# Patient Record
Sex: MALE | Race: WHITE | NOT HISPANIC OR LATINO | Employment: OTHER | URBAN - NONMETROPOLITAN AREA
[De-identification: names, ages, dates, MRNs, and addresses within clinical notes are randomized per-mention and may not be internally consistent; named-entity substitution may affect disease eponyms.]

---

## 2018-09-17 ENCOUNTER — TRANSCRIBE ORDERS (OUTPATIENT)
Dept: ADMINISTRATIVE | Facility: HOSPITAL | Age: 58
End: 2018-09-17

## 2018-09-17 DIAGNOSIS — G89.29 CHRONIC BILATERAL LOW BACK PAIN WITH BILATERAL SCIATICA: Primary | ICD-10-CM

## 2018-09-17 DIAGNOSIS — M54.41 CHRONIC BILATERAL LOW BACK PAIN WITH BILATERAL SCIATICA: Primary | ICD-10-CM

## 2018-09-17 DIAGNOSIS — M54.42 CHRONIC BILATERAL LOW BACK PAIN WITH BILATERAL SCIATICA: Primary | ICD-10-CM

## 2019-07-22 ENCOUNTER — APPOINTMENT (OUTPATIENT)
Dept: CT IMAGING | Facility: HOSPITAL | Age: 59
End: 2019-07-22

## 2019-07-22 ENCOUNTER — HOSPITAL ENCOUNTER (EMERGENCY)
Facility: HOSPITAL | Age: 59
Discharge: HOME | End: 2019-07-22
Attending: EMERGENCY MEDICINE | Admitting: EMERGENCY MEDICINE

## 2019-07-22 VITALS
OXYGEN SATURATION: 96 % | DIASTOLIC BLOOD PRESSURE: 107 MMHG | BODY MASS INDEX: 30.35 KG/M2 | RESPIRATION RATE: 16 BRPM | WEIGHT: 193.4 LBS | SYSTOLIC BLOOD PRESSURE: 153 MMHG | TEMPERATURE: 98.4 F | HEIGHT: 67 IN | HEART RATE: 69 BPM

## 2019-07-22 DIAGNOSIS — S39.92XA INJURY OF BACK, INITIAL ENCOUNTER: Primary | ICD-10-CM

## 2019-07-22 PROCEDURE — 72192 CT PELVIS W/O DYE: CPT

## 2019-07-22 PROCEDURE — 72131 CT LUMBAR SPINE W/O DYE: CPT

## 2019-07-22 PROCEDURE — 96372 THER/PROPH/DIAG INJ SC/IM: CPT

## 2019-07-22 PROCEDURE — 25010000002 MORPHINE PER 10 MG: Performed by: EMERGENCY MEDICINE

## 2019-07-22 PROCEDURE — 99284 EMERGENCY DEPT VISIT MOD MDM: CPT

## 2019-07-22 PROCEDURE — 25010000002 DEXAMETHASONE PER 1 MG: Performed by: EMERGENCY MEDICINE

## 2019-07-22 RX ORDER — KETOROLAC TROMETHAMINE 10 MG/1
10 TABLET, FILM COATED ORAL EVERY 6 HOURS PRN
Status: DISCONTINUED | OUTPATIENT
Start: 2019-07-22 | End: 2019-07-22 | Stop reason: HOSPADM

## 2019-07-22 RX ORDER — DIAZEPAM 5 MG/1
5 TABLET ORAL ONCE
Status: COMPLETED | OUTPATIENT
Start: 2019-07-22 | End: 2019-07-22

## 2019-07-22 RX ORDER — MORPHINE SULFATE 2 MG/ML
8 INJECTION, SOLUTION INTRAMUSCULAR; INTRAVENOUS ONCE
Status: COMPLETED | OUTPATIENT
Start: 2019-07-22 | End: 2019-07-22

## 2019-07-22 RX ORDER — CYCLOBENZAPRINE HCL 10 MG
10 TABLET ORAL 3 TIMES DAILY PRN
Qty: 10 TABLET | Refills: 0 | Status: SHIPPED | OUTPATIENT
Start: 2019-07-22 | End: 2019-08-15

## 2019-07-22 RX ADMIN — DEXAMETHASONE SODIUM PHOSPHATE 10 MG: 10 INJECTION INTRAMUSCULAR; INTRAVENOUS at 14:16

## 2019-07-22 RX ADMIN — MORPHINE SULFATE 8 MG: 2 INJECTION, SOLUTION INTRAMUSCULAR; INTRAVENOUS at 14:17

## 2019-07-22 RX ADMIN — DIAZEPAM 5 MG: 5 TABLET ORAL at 14:15

## 2019-07-22 RX ADMIN — KETOROLAC TROMETHAMINE 10 MG: 10 TABLET, FILM COATED ORAL at 15:15

## 2019-08-15 ENCOUNTER — OFFICE VISIT (OUTPATIENT)
Dept: NEUROSURGERY | Facility: CLINIC | Age: 59
End: 2019-08-15

## 2019-08-15 VITALS — TEMPERATURE: 98.2 F | BODY MASS INDEX: 30.13 KG/M2 | HEIGHT: 67 IN | WEIGHT: 192 LBS

## 2019-08-15 DIAGNOSIS — Z98.1 HISTORY OF FUSION OF LUMBAR SPINE: Primary | ICD-10-CM

## 2019-08-15 PROCEDURE — 99213 OFFICE O/P EST LOW 20 MIN: CPT | Performed by: PHYSICIAN ASSISTANT

## 2019-08-15 RX ORDER — ATORVASTATIN CALCIUM 10 MG/1
10 TABLET, FILM COATED ORAL NIGHTLY
COMMUNITY
Start: 2015-04-09 | End: 2020-05-26

## 2019-08-15 RX ORDER — GABAPENTIN 100 MG/1
200 CAPSULE ORAL NIGHTLY PRN
COMMUNITY
Start: 2015-01-02

## 2019-08-15 RX ORDER — IBUPROFEN 800 MG/1
800 TABLET ORAL 3 TIMES DAILY
COMMUNITY
Start: 2015-01-02 | End: 2019-11-14 | Stop reason: HOSPADM

## 2019-08-15 RX ORDER — ACETAMINOPHEN 325 MG/1
650 TABLET ORAL EVERY 6 HOURS PRN
COMMUNITY
Start: 2015-02-05

## 2019-08-15 RX ORDER — METHYLPREDNISOLONE 4 MG/1
TABLET ORAL
Qty: 21 TABLET | Refills: 0 | Status: SHIPPED | OUTPATIENT
Start: 2019-08-15 | End: 2019-11-07

## 2019-08-15 RX ORDER — OXYCODONE HYDROCHLORIDE 15 MG/1
15 TABLET ORAL EVERY 6 HOURS PRN
COMMUNITY
Start: 2019-08-12

## 2019-08-15 NOTE — PROGRESS NOTES
Patient: Rory Lal  : 1960  Gender: male    Primary Care Provider: Elvi Tobar APRN      Chief Complaint:   Chief Complaint   Patient presents with   • Follow-up       History of Present Illness:  Mr. Lal is a 59-year-old male who presents today for an ED follow-up visit.  He has a history of lumbar fusion in  at L5-S1 by Dr. Whitley.  He reports that he did well since this surgery.  He presented to the ER after an incident which occurred approximately 1 month ago in which he fell off of a swing directly onto his buttocks.  He reports that after this fall he has had severe right hip and leg pain.  He also reports pain in his lower back which is somewhat worse than normal.  He admits to numbness of the lateral right leg.  He denies changes in bowel or bladder dysfunction or saddle distribution numbness.  His gait has been affected since the incident and he states that he will begin using a walker soon. He has taken percocet for 10 years for pain. He reports some improvement with morphine shots in ED. Otherwise he feels that the pain is becoming increasingly worse.      Past Medical and Surgical History:  Past Medical History:   Diagnosis Date   • Arthritis    • Neuropathy      Past Surgical History:   Procedure Laterality Date   • BACK SURGERY      sacrum   • HAND SURGERY Left    • HERNIA REPAIR         Current Medications:    Current Outpatient Medications:   •  acetaminophen (TYLENOL) 325 MG tablet, Take  by mouth., Disp: , Rfl:   •  atorvastatin (LIPITOR) 10 MG tablet, Take  by mouth., Disp: , Rfl:   •  gabapentin (NEURONTIN) 100 MG capsule, Take  by mouth., Disp: , Rfl:   •  ibuprofen (ADVIL,MOTRIN) 800 MG tablet, Take  by mouth., Disp: , Rfl:   •  methylPREDNISolone (MEDROL, MONICA,) 4 MG tablet, Take as directed on package instructions., Disp: 21 tablet, Rfl: 0  •  oxyCODONE (ROXICODONE) 15 MG immediate release tablet, , Disp: , Rfl:     Allergies:  No Known Allergies    Review of  "Systems:  Review of Systems   Musculoskeletal: Positive for arthralgias and back pain.   All other systems reviewed and are negative.        Physical Examination:  Vitals:    08/15/19 1252   Temp: 98.2 °F (36.8 °C)   Weight: 87.1 kg (192 lb)   Height: 170.2 cm (67\")       Physical Exam   Constitutional: He is oriented to person, place, and time. He appears well-developed and well-nourished.   HENT:   Head: Normocephalic and atraumatic.   Neck: Normal range of motion. Neck supple.   Musculoskeletal: Normal range of motion. He exhibits no edema, tenderness or deformity.   Neurological: He is alert and oriented to person, place, and time.   Reflex Scores:       Tricep reflexes are 2+ on the right side and 2+ on the left side.       Bicep reflexes are 2+ on the right side and 2+ on the left side.       Brachioradialis reflexes are 2+ on the right side and 2+ on the left side.       Patellar reflexes are 2+ on the right side and 2+ on the left side.       Achilles reflexes are 2+ on the right side and 2+ on the left side.  Decreased sensation over lateral right leg  Gait is moderately antalgic to the right   Skin: Skin is warm and dry.         Assessment:  Acute on chronic low back pain  History of lumbar fusion L5-S1 for spondylolisthesis by Dr. Whitley in 2004    Plan:  Mr. Lal is seen today for back pain. He has a history of successful L5-S1 fusion by Dr. Whitley in 2004.  He developed an episode of acute on chronic low back pain approximately 1 month ago after falling from a swing.  Lumbar CT performed during ED visit was reviewed it did not reveal any acute abnormalities. I have ordered a lumbar MRI with and without contrast to further evaluate his acute back and right leg pain after the fall. Additionally, I have sent a steroid pack in for him. Patient requests to see Dr. Whitley in [place] following the MRI.     Follow Up:  In [place] after MRI    Lacey Jones PA-C  "

## 2019-08-23 ENCOUNTER — HOSPITAL ENCOUNTER (OUTPATIENT)
Dept: MRI IMAGING | Facility: HOSPITAL | Age: 59
Discharge: HOME | End: 2019-08-23
Admitting: PHYSICIAN ASSISTANT

## 2019-08-23 LAB — CREAT BLDA-MCNC: 1 MG/DL (ref 0.6–1.3)

## 2019-08-23 PROCEDURE — 72158 MRI LUMBAR SPINE W/O & W/DYE: CPT

## 2019-08-23 PROCEDURE — 0 GADOBENATE DIMEGLUMINE 529 MG/ML SOLUTION: Performed by: PHYSICIAN ASSISTANT

## 2019-08-23 PROCEDURE — 82565 ASSAY OF CREATININE: CPT

## 2019-08-23 PROCEDURE — A9577 INJ MULTIHANCE: HCPCS | Performed by: PHYSICIAN ASSISTANT

## 2019-08-23 RX ADMIN — GADOBENATE DIMEGLUMINE 15 ML: 529 INJECTION, SOLUTION INTRAVENOUS at 11:24

## 2019-08-27 ENCOUNTER — OFFICE VISIT (OUTPATIENT)
Dept: NEUROSURGERY | Facility: CLINIC | Age: 59
End: 2019-08-27

## 2019-08-27 VITALS — BODY MASS INDEX: 30.13 KG/M2 | WEIGHT: 192 LBS | HEIGHT: 67 IN

## 2019-08-27 DIAGNOSIS — M25.551 ARTHRALGIA OF HIP, RIGHT: Primary | ICD-10-CM

## 2019-08-27 PROCEDURE — 99213 OFFICE O/P EST LOW 20 MIN: CPT | Performed by: NEUROLOGICAL SURGERY

## 2019-08-27 NOTE — PROGRESS NOTES
Subjective   Rory Lal is a 59 y.o. male who presents for follow up of right hip pain.     The patient is a 59-year-old man who has a history of a fall from a swing when the chain broke which occurred about a month and a half ago.  He has had right hip pain ever since and has difficulty placing pressure on his right foot because of pain in his right lateral hip and groin.  He has a past history of L5-S1 interbody fusion with pedicle screw fixation 15 years ago in 2004 that I did.    A lumbar MRI scan was done on 8/23/2019 and shows solid interbody fusion at L5-S1, and no other significant abnormality other than some facet arthropathy at L4-5.    The following portions of the patient's history were reviewed, updated as appropriate and approved: allergies, current medications, past family history, past medical history, past social history, past surgical history, review of systems and problem list.     Review of Systems   Constitutional: Negative for activity change, appetite change, chills, diaphoresis, fatigue, fever and unexpected weight change.   HENT: Negative for congestion, dental problem, drooling, ear discharge, ear pain, facial swelling, hearing loss, mouth sores, nosebleeds, postnasal drip, rhinorrhea, sinus pressure, sneezing, sore throat, tinnitus, trouble swallowing and voice change.    Eyes: Negative for photophobia, pain, discharge, redness, itching and visual disturbance.   Respiratory: Negative for apnea, cough, choking, chest tightness, shortness of breath, wheezing and stridor.    Cardiovascular: Negative for chest pain, palpitations and leg swelling.   Gastrointestinal: Negative for abdominal distention, abdominal pain, anal bleeding, blood in stool, constipation, diarrhea, nausea, rectal pain and vomiting.   Endocrine: Negative for cold intolerance, heat intolerance, polydipsia, polyphagia and polyuria.   Genitourinary: Negative for decreased urine volume, difficulty urinating, dysuria,  enuresis, flank pain, frequency, genital sores, hematuria and urgency.   Musculoskeletal: Positive for back pain and myalgias. Negative for arthralgias, gait problem, joint swelling, neck pain and neck stiffness.   Skin: Negative for color change, pallor, rash and wound.   Allergic/Immunologic: Negative for environmental allergies, food allergies and immunocompromised state.   Neurological: Negative for dizziness, tremors, seizures, syncope, facial asymmetry, speech difficulty, weakness, light-headedness, numbness and headaches.   Hematological: Negative for adenopathy. Does not bruise/bleed easily.   Psychiatric/Behavioral: Negative for agitation, behavioral problems, confusion, decreased concentration, dysphoric mood, hallucinations, self-injury, sleep disturbance and suicidal ideas. The patient is not nervous/anxious and is not hyperactive.        Objective    NEUROLOGICAL EXAMINATION:    The relevant finding on his examination is severe pain in the right hip with internal rotation in the sitting position.  Other than this his exam is negative.    Assessment   Physical examination consistent with right hip pain as the source of discomfort.  No abnormality of the lumbar spine; prior PLIF L5-S1 which is stable and unremarkable.       Plan   Referral to orthopedics for right hip pain disorder.       Ja Whitley MD

## 2019-09-03 DIAGNOSIS — M25.551 ARTHRALGIA OF RIGHT HIP: Primary | ICD-10-CM

## 2019-09-04 ENCOUNTER — OFFICE VISIT (OUTPATIENT)
Dept: ORTHOPEDIC SURGERY | Facility: CLINIC | Age: 59
End: 2019-09-04

## 2019-09-04 VITALS — WEIGHT: 192 LBS | HEIGHT: 67 IN | RESPIRATION RATE: 18 BRPM | BODY MASS INDEX: 30.13 KG/M2

## 2019-09-04 DIAGNOSIS — M51.36 DDD (DEGENERATIVE DISC DISEASE), LUMBAR: ICD-10-CM

## 2019-09-04 DIAGNOSIS — M16.11 PRIMARY OSTEOARTHRITIS OF RIGHT HIP: Primary | ICD-10-CM

## 2019-09-04 DIAGNOSIS — S76.011A STRAIN OF FLEXOR MUSCLE OF RIGHT HIP, INITIAL ENCOUNTER: ICD-10-CM

## 2019-09-04 PROCEDURE — 99214 OFFICE O/P EST MOD 30 MIN: CPT | Performed by: PHYSICIAN ASSISTANT

## 2019-09-04 PROCEDURE — 96372 THER/PROPH/DIAG INJ SC/IM: CPT | Performed by: PHYSICIAN ASSISTANT

## 2019-09-04 RX ORDER — TIZANIDINE 4 MG/1
4 TABLET ORAL NIGHTLY PRN
Qty: 14 TABLET | Refills: 0 | Status: SHIPPED | OUTPATIENT
Start: 2019-09-04 | End: 2020-05-26

## 2019-09-04 RX ORDER — KETOROLAC TROMETHAMINE 30 MG/ML
60 INJECTION, SOLUTION INTRAMUSCULAR; INTRAVENOUS ONCE
Status: COMPLETED | OUTPATIENT
Start: 2019-09-04 | End: 2019-09-04

## 2019-09-04 RX ADMIN — KETOROLAC TROMETHAMINE 60 MG: 30 INJECTION, SOLUTION INTRAMUSCULAR; INTRAVENOUS at 11:27

## 2019-09-04 NOTE — PROGRESS NOTES
"Subjective   Patient ID: Rory Lal is a 59 y.o. right hand dominant male  Pain of the Right Hip (Reports he was swinging July 16, 2019 and the swing came undone causing him to fall on his hip, referred by Dr Whitley, stated lumbar was evaluated with no changes)         History of Present Illness  Patient presents as a new patient with complaints of right posterior lateral hip arthralgia that has been ongoing since July 16, 2019.  He states he was on a swing that failed and broke causing him to land on his right hip and lower back.  He did visit the emergency room on 7/22/2019 where he had a CT scan of the right hip and lumbar spine which was negative for acute process.  He states he has since followed up with his spine doctor whom ordered an MRI of lumbar spine and was told \"it was scar tissue\" per patient.    He denies any type of numbness to the right leg.  He denies bowel or bladder dysfunction.  Patient does take Percocet for chronic back pain and requests something stronger to take at nighttime.  Pain Score: 9  Pain Location: Hip  Pain Orientation: Right     Pain Descriptors: Aching, Throbbing, Radiating  Pain Frequency: Constant/continuous  Pain Onset: Sudden     Clinical Progression: Not changed           Pain Intervention(s): Rest, TENS  Result of Injury: Yes       Past Medical History:   Diagnosis Date   • Arthritis    • Lumbosacral disc disease    • Neuropathy         Past Surgical History:   Procedure Laterality Date   • BACK SURGERY  2004    S1-L5 fusion   • HAND SURGERY Left    • HERNIA REPAIR         History reviewed. No pertinent family history.    Social History     Socioeconomic History   • Marital status:      Spouse name: Not on file   • Number of children: Not on file   • Years of education: Not on file   • Highest education level: Not on file   Occupational History     Employer: DISABLED   Tobacco Use   • Smoking status: Former Smoker   • Smokeless tobacco: Current User     Types: " "Chew   Substance and Sexual Activity   • Alcohol use: No     Frequency: Never   • Drug use: No   • Sexual activity: Defer         Current Outpatient Medications:   •  acetaminophen (TYLENOL) 325 MG tablet, Take  by mouth., Disp: , Rfl:   •  atorvastatin (LIPITOR) 10 MG tablet, Take  by mouth., Disp: , Rfl:   •  gabapentin (NEURONTIN) 100 MG capsule, Take  by mouth., Disp: , Rfl:   •  ibuprofen (ADVIL,MOTRIN) 800 MG tablet, Take  by mouth., Disp: , Rfl:   •  methylPREDNISolone (MEDROL, MONICA,) 4 MG tablet, Take as directed on package instructions., Disp: 21 tablet, Rfl: 0  •  oxyCODONE (ROXICODONE) 15 MG immediate release tablet, , Disp: , Rfl:   •  tiZANidine (ZANAFLEX) 4 MG tablet, Take 1 tablet by mouth At Night As Needed for Muscle Spasms., Disp: 14 tablet, Rfl: 0  No current facility-administered medications for this visit.     No Known Allergies    Review of Systems   Constitutional: Negative for diaphoresis, fever and unexpected weight change.   HENT: Negative for dental problem and sore throat.    Eyes: Negative for visual disturbance.   Respiratory: Negative for shortness of breath.    Cardiovascular: Negative for chest pain.   Gastrointestinal: Negative for abdominal pain, constipation, diarrhea, nausea and vomiting.   Genitourinary: Negative for difficulty urinating and frequency.   Musculoskeletal: Positive for arthralgias (right hip).   Neurological: Negative for headaches.   Hematological: Does not bruise/bleed easily.       I have reviewed all of the above social hx, family hx, surgical hx, medications, allergies & ROS and confirm that it is accurate.    Objective   Resp 18   Ht 170.2 cm (67\")   Wt 87.1 kg (192 lb)   BMI 30.07 kg/m²    Physical Exam   Constitutional: He appears well-developed.   Eyes: Conjunctivae are normal.   Pulmonary/Chest: Effort normal.   Musculoskeletal:        Right hip: He exhibits no crepitus and no deformity.        Lumbar back: He exhibits tenderness, bony tenderness and " pain.        Legs:  Neurological: He is alert.   Nursing note and vitals reviewed.    Right Hip Exam     Tenderness   The patient is experiencing tenderness in the posterior and lateral.    Range of Motion   Abduction: 30   Adduction: 20   Flexion: 90     Muscle Strength   The patient has normal right hip strength.    Tests   JAIME: negative  Ronnie: negative    Other   Sensation: normal  Pulse: present      Left Hip Exam     Muscle Strength   The patient has normal left hip strength.       Back Exam     Tenderness   The patient is experiencing tenderness in the lumbar and sacroiliac.    Reflexes   Patellar: 2/4    Other   Gait: antalgic (uses a crutch to assist with ambulation)            Extremity DVT signs are Negative on physical exam with negative Jessica sign, with no calf pain, with no palpable cords, with no increased pain with passive stretch/extension and with no skin tone change   Neurologic Exam         Assessment/Plan   Independent Review of Radiographic Studies:    Shows no acute fracture or dislocation.  I did review CT imaging and the report of the hip and lumbar spine which revealed no acute process.  I also reviewed the MRI of the lumbar spine performed August 2019      Procedures       Rory was seen today for pain.    Diagnoses and all orders for this visit:    Primary osteoarthritis of right hip  -     Ambulatory Referral to Physical Therapy Evaluate and treat, Ortho; Heat, Electrotherapy; Iontophoresis  -     tiZANidine (ZANAFLEX) 4 MG tablet; Take 1 tablet by mouth At Night As Needed for Muscle Spasms.  -     ketorolac (TORADOL) injection 60 mg  -     FL Guided Pain Management Large Joint    DDD (degenerative disc disease), lumbar  -     Ambulatory Referral to Physical Therapy Evaluate and treat, Ortho; Heat, Electrotherapy; Iontophoresis  -     tiZANidine (ZANAFLEX) 4 MG tablet; Take 1 tablet by mouth At Night As Needed for Muscle Spasms.    Strain of flexor muscle of right hip, initial  encounter  -     Ambulatory Referral to Physical Therapy Evaluate and treat, Ortho; Heat, Electrotherapy; Iontophoresis  -     tiZANidine (ZANAFLEX) 4 MG tablet; Take 1 tablet by mouth At Night As Needed for Muscle Spasms.  -     ketorolac (TORADOL) injection 60 mg       Discussion of orthopedic goals  Risk, benefits, and merits of treatment alternatives reviewed with the patient and questions answered  Physical therapy referral given    Recommendations/Plan:  Exercise, medications, injections, other patient advice, and return appointment as noted.  Patient is encouraged to call or return for any issues or concerns.    I did order an in office intramuscular Toradol injection to help with pain and inflammation      Instructed patient to use warm heat several times daily to assist with muscle spasm relief  Patient agreeable to call or return sooner for any concerns.             EMR Dragon-transcription disclaimer:  This encounter note is an electronic transcription of spoken language to printed text.  Electronic transcription of spoken language may permit erroneous or at times nonsensical words or phrases to be inadvertently transcribed.  Although I have reviewed the note for such errors, some may still exist

## 2019-09-20 ENCOUNTER — HOSPITAL ENCOUNTER (OUTPATIENT)
Dept: GENERAL RADIOLOGY | Facility: HOSPITAL | Age: 59
Discharge: HOME | End: 2019-09-20
Admitting: PHYSICIAN ASSISTANT

## 2019-09-20 PROCEDURE — 25010000002 METHYLPREDNISOLONE PER 80 MG: Performed by: PHYSICIAN ASSISTANT

## 2019-09-20 PROCEDURE — 77002 NEEDLE LOCALIZATION BY XRAY: CPT

## 2019-09-20 PROCEDURE — 25010000003 LIDOCAINE 1 % SOLUTION: Performed by: PHYSICIAN ASSISTANT

## 2019-09-20 RX ORDER — METHYLPREDNISOLONE ACETATE 80 MG/ML
80 INJECTION, SUSPENSION INTRA-ARTICULAR; INTRALESIONAL; INTRAMUSCULAR; SOFT TISSUE ONCE
Status: COMPLETED | OUTPATIENT
Start: 2019-09-20 | End: 2019-09-20

## 2019-09-20 RX ORDER — LIDOCAINE HYDROCHLORIDE 10 MG/ML
10 INJECTION, SOLUTION INFILTRATION; PERINEURAL ONCE
Status: COMPLETED | OUTPATIENT
Start: 2019-09-20 | End: 2019-09-20

## 2019-09-20 RX ADMIN — METHYLPREDNISOLONE ACETATE 80 MG: 80 INJECTION, SUSPENSION INTRA-ARTICULAR; INTRALESIONAL; INTRAMUSCULAR; SOFT TISSUE at 12:26

## 2019-09-20 RX ADMIN — LIDOCAINE HYDROCHLORIDE 9 ML: 10 INJECTION, SOLUTION INFILTRATION; PERINEURAL at 12:28

## 2019-09-20 NOTE — POST-PROCEDURE NOTE
[facility]  [address], [address]  [place] [address]  [phone]        PROCEDURE REPORT        DIAGNOSIS:  Right hip osteoarthritis, symptomatic    PROCEDURE: Right  hip injection under flouroscopy      Rory Lal with date of birth 1960 presents to [facility] Radiology Department today for injection therapy.        Patient presents to [facility] Radiology Department Flouroscopy Suite on 9/20/2019 for planned elective right hip injection under flouroscopy for symptomatic osteoarthritis.    Procedure:     After consent was obtained, and using ethyl chloride topical local anesthetic, the right hip was then prepped and draped with sterile technique. With an anterior hip approach, flouroscopy guidance, and care to stay lateral of the femoral artery, the hip joint was entered via a 20 gauge spinal needle.  A mixture of 80 mg methylprednisolone in one ml plus 9 ml of 1% plain Lidocaine was injected and the needle withdrawn. The procedure was well tolerated and without complication. The patient noted relief of focal hip joint pain.  The patient did remain stable and with baseline ambulation. The patient is asked to rest the joint for a few more days before resuming full regular activities. It may be painful for the first few days. Watch for fever, skin issues, increased swelling or persistent pain in the joint. Call or return to clinic if such symptoms occur, other concerns or if there is lack of improvement as anticipated.    Impression: Symptomatic right hip osteoarthritis.      Recommendations/Plan:      Treatment and patient advice as noted here and in office visit report.  Orthopedic activities reviewed and patient expressed appreciation.  Discussion of orthopedic goals.   Risk, benefits, and merits of treatment options reviewed and questions answered.  Call or notify for any adverse effect from injection therapy.    Exercise: As tolerated.  No strenuous  activity for a few days as appropriate.  Brace:  No brace was given at today's visit  Referral: No referrals made at today's visit  Studies: No additional studies ordered.  Surgery: No surgery proposed at this visit.  Activity:  May perform usual activities as tolerated.      Patient will return to our clinic at scheduled appointment.  Patient agreeable to call or return sooner for any concerns.

## 2019-09-24 ENCOUNTER — TREATMENT (OUTPATIENT)
Dept: PHYSICAL THERAPY | Facility: CLINIC | Age: 59
End: 2019-09-24

## 2019-09-24 DIAGNOSIS — M16.10 HIP ARTHRITIS: ICD-10-CM

## 2019-09-24 DIAGNOSIS — M25.551 PAIN OF RIGHT HIP JOINT: Primary | ICD-10-CM

## 2019-09-24 PROCEDURE — 97161 PT EVAL LOW COMPLEX 20 MIN: CPT | Performed by: PHYSICAL THERAPIST

## 2019-09-24 PROCEDURE — 97110 THERAPEUTIC EXERCISES: CPT | Performed by: PHYSICAL THERAPIST

## 2019-09-24 NOTE — PROGRESS NOTES
Physical Therapy Initial Evaluation and Plan of Care      Patient: Rory Lal   : 1960  Diagnosis/ICD-10 Code:  Pain of right hip joint [M25.551]  Referring practitioner: ANNA Ramsay*    Subjective Evaluation    History of Present Illness  Mechanism of injury: Pt reports his hip feels like it is going in and out.  He reports hip pain since 2019 after a fall.  Pt report he had pain and tingling in the R Foot.  He has had back surgery in the past and numbness an tingling in the R great toe.     Currently R pelvic pain in the car and prolonged sitting.     Pt reports he got an injection in the hip last week in the hip and that really helped and relieved pain.     He is unable to put a sock on his R LE.     Pt reports he has seen Dr. Whitley recently.     Pain  Current pain ratin  Location: R anterior groin and the R Low back, knee  Relieving factors: medications  Aggravating factors: ambulation, standing, movement, sleeping and repetitive movement (sitting as well)    Treatments  Previous treatment: physical therapy (years ago for the back in .)           Objective       Palpation     Right   Hypertonic in the adductor brevis, adductor longus, adductor juan and iliopsoas. Tenderness of the adductor brevis, adductor longus, adductor juan and iliopsoas.     Additional Palpation Details  Anterior hip tender.  Deep pressure seemed to relieve the pressure and reduce pain.    Distraction + for relief of pain.     Active Range of Motion     Lumbar   Flexion: Active lumbar flexion: hip pain. only. WFL    Right Hip   Flexion: 65 (supine) degrees with pain  Abduction: 15 degrees with pain    Additional Active Range of Motion Details  Lumbar spine motion is not guarded with testing.    SLR 30 degrees active - pain in the anterior hip and groin.     Passive Range of Motion     Right Hip   Flexion: 70 (anterior hip pain.) degrees with pain    Joint Play     Right Hip     Hypomobile in the  posterior hip capsule, anterior hip capsule, lateral hip capsule and long axis distraction    Strength/Myotome Testing     Additional Strength Details  Unable to formall assess due to pain and guarding.          Assessment & Plan     Assessment  Impairments: abnormal gait, abnormal muscle tone, abnormal or restricted ROM, activity intolerance, impaired physical strength, lacks appropriate home exercise program, pain with function and weight-bearing intolerance  Assessment details: Patient is a 59 year old male who comes to physical therapy with pain and guarding fairly high and limiting assessment. Signs and symptoms are consistent with hip pathology more than radicular back sxs.  The patient currently has pain, decreased ROM, decreased strength, and inability to perform all essential functional activities. Pt will benefit from skilled PT services to address the above issues.     Prognosis: good  Functional Limitations: carrying objects, walking, pushing, standing, stooping and unable to perform repetitive tasks  Goals  Plan Goals: SHORT TERM GOALS:  2 weeks       1. Pt independent with HEP  2. Pt to demonstrate turner hip strength 3/5 or greater to improve stability with ambulation  3. Pt to report being able to walk for 10 minutes without increasing pain in the right hip    LONG TERM GOALS:   6 weeks  1. Pt to demonstrate ability to perform 1/4 functional squat with good form and control of the hips and without increasing pain  2. Pt to demonstrate turner hip strength to 4-/5 or greater to improve safety with ambulation on uneven surfaces  3. Pt to return to work full duty without increased pain in the right hip(s)   4. Pt to demonstrate ability to perform step up/down 8 inch step x10 safely and without pain in the right hip(s)       Plan  Therapy options: will be seen for skilled physical therapy services  Planned modality interventions: electrical stimulation/Vietnamese stimulation, ultrasound, cryotherapy and  thermotherapy (hydrocollator packs)  Planned therapy interventions: functional ROM exercises, gait training, home exercise program, flexibility, body mechanics training, balance/weight-bearing training, transfer training, therapeutic activities, stretching, strengthening, manual therapy and postural training  Duration in visits: 8  Treatment plan discussed with: patient        Manual Therapy:    4     mins  33419;  Therapeutic Exercise:    9     mins  30760;     Neuromuscular Jennifer:        mins  21807;    Therapeutic Activity:          mins  59801;     Gait Training:           mins  24007;     Ultrasound:          mins  13161;    Electrical Stimulation:        mins  70106 ( );  Dry Needling          mins self-pay    Timed Treatment:   13   mins   Total Treatment:     38   mins    PT SIGNATURE: Rustam Glez, PT   DATE TREATMENT INITIATED: 9/24/2019    Medicare Initial Certification  Certification Period: 12/23/2019  I certify that the therapy services are furnished while this patient is under my care.  The services outlined above are required by this patient, and will be reviewed every 90 days.     PHYSICIAN: Foreign Shirley PA-C      DATE:     Please sign and return via fax to  .. Thank you, [facility] Physical Therapy.

## 2019-10-01 ENCOUNTER — TREATMENT (OUTPATIENT)
Dept: PHYSICAL THERAPY | Facility: CLINIC | Age: 59
End: 2019-10-01

## 2019-10-01 DIAGNOSIS — M16.10 HIP ARTHRITIS: ICD-10-CM

## 2019-10-01 DIAGNOSIS — M25.551 PAIN OF RIGHT HIP JOINT: Primary | ICD-10-CM

## 2019-10-01 PROCEDURE — 97110 THERAPEUTIC EXERCISES: CPT | Performed by: PHYSICAL THERAPIST

## 2019-10-01 PROCEDURE — 97140 MANUAL THERAPY 1/> REGIONS: CPT | Performed by: PHYSICAL THERAPIST

## 2019-10-01 NOTE — PROGRESS NOTES
Physical Therapy Daily Progress Note    Patient Information  Rory Lal  1960      Visit # : 2    Rory Lal reports 9/10 pain today at rest.  Pt reports he has been feeling terrible recently.  He reports that his leg has been giving away and he fell again. Pt reports that he did feel relief that first day but no significant carryover.    R hip lateral area is painful.  No pain meds today.         Objective Pt presents to PT today with moderate distress noted entering PT area. Moderate antalgia.     PROM hip ext is + for relief of sxs.      Pt having immediate and severe pain in the anterior hip with PROM hip IR/ER.       See Exercise, Manual, and Modality Logs for complete treatment.     Assessment/Plan  Pt with less pain in the hip after PT activity today.  He is still having sharp painful moments with hip IR/ER and FLexion.        Progress per Plan of Care and Progress strengthening /stabilization /functional activity  Check response to more exercise and stretching.     Visit Diagnoses:    ICD-10-CM ICD-9-CM   1. Pain of right hip joint M25.551 719.45   2. Hip arthritis M16.10 716.95            Manual Therapy:    14     mins  28054;  Therapeutic Exercise:    24     mins  02112;     Neuromuscular Jennifer:        mins  81979;    Therapeutic Activity:          mins  62350;     Gait Training:        ___  mins  43497;     Ultrasound:          mins  43738;    Electrical Stimulation:         mins  80040 ( );  Dry Needling          mins self-pay    Timed Treatment:   38   mins   Total Treatment:     42   mins    Rustam Glez, PT  Physical Therapist

## 2019-10-08 ENCOUNTER — TREATMENT (OUTPATIENT)
Dept: PHYSICAL THERAPY | Facility: CLINIC | Age: 59
End: 2019-10-08

## 2019-10-08 DIAGNOSIS — M16.10 HIP ARTHRITIS: ICD-10-CM

## 2019-10-08 DIAGNOSIS — M25.551 PAIN OF RIGHT HIP JOINT: Primary | ICD-10-CM

## 2019-10-08 PROCEDURE — 97110 THERAPEUTIC EXERCISES: CPT | Performed by: PHYSICAL THERAPIST

## 2019-10-08 PROCEDURE — 97530 THERAPEUTIC ACTIVITIES: CPT | Performed by: PHYSICAL THERAPIST

## 2019-10-08 NOTE — PROGRESS NOTES
Physical Therapy Daily Progress Note  D/C note       Patient Information  Rory Lal  1960      Visit # : 3    Rory Lal reports 9/10 pain today at rest.  Pt with elevated pain after last PT treatment.  Pt reports he has been doing less activity overall due to pain.         Objective Pt presents to PT today with moderate+ distress noted.     R hip flexion supine 59 pain very elevated.     PROM R hip abd 11 degrees.     Active SLR R LE 14 degrees     Passive SLR R LE 66 degrees and pain solely in the anterior hip on the R LE.       See Exercise, Manual, and Modality Logs for complete treatment.     Assessment/Plan  Pt with pain worsening.  Pt has less ROM today as compared to his first visit. The hip joint seems to be the pain generator and not the spine.  Due to his pain and ROM getting worse we will hold PT at this time.         PT will hold at this time.      Visit Diagnoses:    ICD-10-CM ICD-9-CM   1. Pain of right hip joint M25.551 719.45   2. Hip arthritis M16.10 716.95            Manual Therapy:         mins  69133;  Therapeutic Exercise:    26     mins  36919;     Neuromuscular Jenniefr:        mins  64291;    Therapeutic Activity:     13     mins  79401;     Gait Training:        ___  mins  70902;     Ultrasound:          mins  04516;    Electrical Stimulation:         mins  60929 ( );  Dry Needling          mins self-pay    Timed Treatment:   39   mins   Total Treatment:      39  mins    Rustam Glez, PT  Physical Therapist

## 2019-10-23 ENCOUNTER — OFFICE VISIT (OUTPATIENT)
Dept: ORTHOPEDIC SURGERY | Facility: CLINIC | Age: 59
End: 2019-10-23

## 2019-10-23 VITALS — RESPIRATION RATE: 18 BRPM | HEIGHT: 67 IN | WEIGHT: 192 LBS | BODY MASS INDEX: 30.13 KG/M2

## 2019-10-23 DIAGNOSIS — M51.36 DDD (DEGENERATIVE DISC DISEASE), LUMBAR: ICD-10-CM

## 2019-10-23 DIAGNOSIS — M16.11 PRIMARY OSTEOARTHRITIS OF RIGHT HIP: Primary | ICD-10-CM

## 2019-10-23 PROCEDURE — 99213 OFFICE O/P EST LOW 20 MIN: CPT | Performed by: PHYSICIAN ASSISTANT

## 2019-10-23 NOTE — PROGRESS NOTES
Subjective   Patient ID: Rory Lal is a 59 y.o. right hand dominant male  Follow-up and Pain of the Right Hip (Patient is here today for a follow up on his right hip, he states he did do 3 therapy visits but states he no better than he was.)         History of Present Illness  Patient is following up with a scheduled appointment in regards to right hip arthralgia.  He reports his pain is still significant 10 out of 10.  He did have a recent right hip fluoroscopy guided cortisone injection which provided 2 days of complete relief.  Patient has also tried muscle relaxers and anti-inflammatories with no significant improvement however, the pain returned.  He denies numbness or tingling to the lower extremity.  Denies bowel bladder incontinence.  He states his back feels the same as it has always felt in regards to chronic pain.                                                 Past Medical History:   Diagnosis Date   • Arthritis    • Lumbosacral disc disease    • Neuropathy         Past Surgical History:   Procedure Laterality Date   • BACK SURGERY  2004    S1-L5 fusion   • HAND SURGERY Left    • HERNIA REPAIR         History reviewed. No pertinent family history.    Social History     Socioeconomic History   • Marital status:      Spouse name: Not on file   • Number of children: Not on file   • Years of education: Not on file   • Highest education level: Not on file   Occupational History     Employer: DISABLED   Tobacco Use   • Smoking status: Former Smoker   • Smokeless tobacco: Current User     Types: Chew   Substance and Sexual Activity   • Alcohol use: No     Frequency: Never   • Drug use: No   • Sexual activity: Defer         Current Outpatient Medications:   •  acetaminophen (TYLENOL) 325 MG tablet, Take  by mouth., Disp: , Rfl:   •  atorvastatin (LIPITOR) 10 MG tablet, Take  by mouth., Disp: , Rfl:   •  gabapentin (NEURONTIN) 100 MG capsule, Take  by mouth., Disp: , Rfl:   •  ibuprofen  "(ADVIL,MOTRIN) 800 MG tablet, Take  by mouth., Disp: , Rfl:   •  methylPREDNISolone (MEDROL, MONICA,) 4 MG tablet, Take as directed on package instructions., Disp: 21 tablet, Rfl: 0  •  oxyCODONE (ROXICODONE) 15 MG immediate release tablet, , Disp: , Rfl:   •  tiZANidine (ZANAFLEX) 4 MG tablet, Take 1 tablet by mouth At Night As Needed for Muscle Spasms., Disp: 14 tablet, Rfl: 0    No Known Allergies    Review of Systems   Constitutional: Negative for fever.   HENT: Negative for dental problem and voice change.    Eyes: Negative for visual disturbance.   Respiratory: Negative for shortness of breath.    Cardiovascular: Negative for chest pain.   Gastrointestinal: Negative for abdominal pain.   Genitourinary: Negative for dysuria.   Musculoskeletal: Positive for arthralgias. Negative for gait problem and joint swelling.   Skin: Negative for rash.   Neurological: Negative for speech difficulty.   Hematological: Does not bruise/bleed easily.   Psychiatric/Behavioral: Negative for confusion.       I have reviewed the above medical and surgical history, family history, social history, medications, allergies and review of systems.    Objective   Resp 18   Ht 170.2 cm (67\")   Wt 87.1 kg (192 lb)   BMI 30.07 kg/m²    Physical Exam   Constitutional: He is oriented to person, place, and time. He appears well-developed and well-nourished.   HENT:   Head: Normocephalic.   Eyes: Conjunctivae are normal.   Neck: Neck supple.   Cardiovascular: Normal rate.   Pulmonary/Chest: Effort normal.   Musculoskeletal:        Right hip: He exhibits decreased range of motion, tenderness and crepitus. He exhibits no deformity.        Lumbar back: He exhibits tenderness.   Neurological: He is alert and oriented to person, place, and time.   Skin: Capillary refill takes less than 2 seconds.   Psychiatric: He has a normal mood and affect.   Nursing note and vitals reviewed.    Right Hip Exam     Range of Motion   Abduction: 30   Flexion: 70 " "    Muscle Strength   The patient has normal right hip strength.    Tests   JAIME: positive  Fadir:  Positive FADIR test           Extremity DVT signs are Negative by clinical screen.   Neurologic Exam     Mental Status   Oriented to person, place, and time.          TXA questions were reviewed with the patient-patient is a candidate for TXA    Assessment/Plan   Independent Review of Radiographic Studies:    No new imaging done today.  Reviewed with patient at a prior visit.  Patient did have a CT scan of the pelvis July 2019 which revealed moderate right hip degenerative changes.    Procedures       Rory was seen today for follow-up and pain.    Diagnoses and all orders for this visit:    Primary osteoarthritis of right hip    DDD (degenerative disc disease), lumbar       Discussion of orthopedic goals  Risk, benefits, and merits of treatment alternatives reviewed with the patient and questions answered  The nature of the proposed surgery reviewed with the patient including risks, benefits, rehabilitation, recovery timeframe, and outcome expectations    Recommendations/Plan:  Surgery: Surgery proposed at this visit as noted. and If symptoms and functional limitations persist with current treatment, patient to consider elective surgery.  Patient is encouraged to call or return for any issues or concerns.    I had a long discussion with the patient in regards to treatment options.  We discussed the option of right total hip arthroplasty.  He would like to proceed.  He states \"I am certain all of my pain is coming from my hip\".  He was recently evaluated by neurosurgery for his lower back in which they felt his hip was the culprit of his pain    Plan: Right total hip arthroplasty  Patient agreeable to call or return sooner for any concerns.           EMR Dragon-transcription disclaimer:  This encounter note is an electronic transcription of spoken language to printed text.  Electronic transcription of spoken " language may permit erroneous or at times nonsensical words or phrases to be inadvertently transcribed.  Although I have reviewed the note for such errors, some may still exist

## 2019-10-25 ENCOUNTER — PREP FOR SURGERY (OUTPATIENT)
Dept: OTHER | Facility: HOSPITAL | Age: 59
End: 2019-10-25

## 2019-10-25 DIAGNOSIS — M16.11 PRIMARY OSTEOARTHRITIS OF RIGHT HIP: Primary | ICD-10-CM

## 2019-10-29 RX ORDER — CEFAZOLIN SODIUM 2 G/50ML
2 SOLUTION INTRAVENOUS
Status: CANCELLED | OUTPATIENT
Start: 2019-10-30 | End: 2019-10-31

## 2019-10-30 PROBLEM — M16.11 PRIMARY OSTEOARTHRITIS OF RIGHT HIP: Status: ACTIVE | Noted: 2019-10-30

## 2019-11-06 ENCOUNTER — TELEPHONE (OUTPATIENT)
Dept: ORTHOPEDIC SURGERY | Facility: CLINIC | Age: 59
End: 2019-11-06

## 2019-11-07 ENCOUNTER — HOSPITAL ENCOUNTER (OUTPATIENT)
Dept: GENERAL RADIOLOGY | Facility: HOSPITAL | Age: 59
Discharge: HOME | End: 2019-11-07
Admitting: PHYSICIAN ASSISTANT

## 2019-11-07 ENCOUNTER — APPOINTMENT (OUTPATIENT)
Dept: PREADMISSION TESTING | Facility: HOSPITAL | Age: 59
End: 2019-11-07

## 2019-11-07 VITALS
HEART RATE: 84 BPM | DIASTOLIC BLOOD PRESSURE: 84 MMHG | WEIGHT: 188 LBS | HEIGHT: 68 IN | OXYGEN SATURATION: 96 % | SYSTOLIC BLOOD PRESSURE: 117 MMHG | BODY MASS INDEX: 28.49 KG/M2

## 2019-11-07 DIAGNOSIS — M16.11 PRIMARY OSTEOARTHRITIS OF RIGHT HIP: ICD-10-CM

## 2019-11-07 LAB
ABO GROUP BLD: NORMAL
ALBUMIN SERPL-MCNC: 4.2 G/DL (ref 3.5–5.2)
ALBUMIN/GLOB SERPL: 1.4 G/DL
ALP SERPL-CCNC: 77 U/L (ref 39–117)
ALT SERPL W P-5'-P-CCNC: 26 U/L (ref 1–41)
AMORPH URATE CRY URNS QL MICRO: ABNORMAL /HPF
ANION GAP SERPL CALCULATED.3IONS-SCNC: 12 MMOL/L (ref 5–15)
APTT PPP: 25.6 SECONDS (ref 24.5–37.2)
AST SERPL-CCNC: 43 U/L (ref 1–40)
BACTERIA UR QL AUTO: ABNORMAL /HPF
BASOPHILS # BLD AUTO: 0.03 10*3/MM3 (ref 0–0.2)
BASOPHILS NFR BLD AUTO: 0.6 % (ref 0–1.5)
BILIRUB SERPL-MCNC: 0.7 MG/DL (ref 0.2–1.2)
BILIRUB UR QL STRIP: NEGATIVE
BUN BLD-MCNC: 18 MG/DL (ref 6–20)
BUN/CREAT SERPL: 17.8 (ref 7–25)
CALCIUM SPEC-SCNC: 9.6 MG/DL (ref 8.6–10.5)
CHLORIDE SERPL-SCNC: 103 MMOL/L (ref 98–107)
CLARITY UR: ABNORMAL
CO2 SERPL-SCNC: 25 MMOL/L (ref 22–29)
COLOR UR: YELLOW
CREAT BLD-MCNC: 1.01 MG/DL (ref 0.76–1.27)
DEPRECATED RDW RBC AUTO: 43.8 FL (ref 37–54)
EOSINOPHIL # BLD AUTO: 0.11 10*3/MM3 (ref 0–0.4)
EOSINOPHIL NFR BLD AUTO: 2.3 % (ref 0.3–6.2)
ERYTHROCYTE [DISTWIDTH] IN BLOOD BY AUTOMATED COUNT: 13.4 % (ref 12.3–15.4)
GFR SERPL CREATININE-BSD FRML MDRD: 76 ML/MIN/1.73
GLOBULIN UR ELPH-MCNC: 3 GM/DL
GLUCOSE BLD-MCNC: 109 MG/DL (ref 65–99)
GLUCOSE UR STRIP-MCNC: NEGATIVE MG/DL
HBA1C MFR BLD: 5.7 % (ref 4.8–5.6)
HCT VFR BLD AUTO: 41 % (ref 37.5–51)
HGB BLD-MCNC: 13.3 G/DL (ref 13–17.7)
HGB UR QL STRIP.AUTO: NEGATIVE
HYALINE CASTS UR QL AUTO: ABNORMAL /LPF
IMM GRANULOCYTES # BLD AUTO: 0.01 10*3/MM3 (ref 0–0.05)
IMM GRANULOCYTES NFR BLD AUTO: 0.2 % (ref 0–0.5)
INR PPP: 0.96 (ref 0.9–1.1)
KETONES UR QL STRIP: ABNORMAL
LEUKOCYTE ESTERASE UR QL STRIP.AUTO: NEGATIVE
LYMPHOCYTES # BLD AUTO: 1.18 10*3/MM3 (ref 0.7–3.1)
LYMPHOCYTES NFR BLD AUTO: 24.8 % (ref 19.6–45.3)
MCH RBC QN AUTO: 29.2 PG (ref 26.6–33)
MCHC RBC AUTO-ENTMCNC: 32.4 G/DL (ref 31.5–35.7)
MCV RBC AUTO: 90.1 FL (ref 79–97)
MONOCYTES # BLD AUTO: 0.89 10*3/MM3 (ref 0.1–0.9)
MONOCYTES NFR BLD AUTO: 18.7 % (ref 5–12)
NEUTROPHILS # BLD AUTO: 2.53 10*3/MM3 (ref 1.7–7)
NEUTROPHILS NFR BLD AUTO: 53.4 % (ref 42.7–76)
NITRITE UR QL STRIP: NEGATIVE
NRBC BLD AUTO-RTO: 0 /100 WBC (ref 0–0.2)
PH UR STRIP.AUTO: <=5 [PH] (ref 5–8)
PLATELET # BLD AUTO: 254 10*3/MM3 (ref 140–450)
PMV BLD AUTO: 9.6 FL (ref 6–12)
POTASSIUM BLD-SCNC: 4.6 MMOL/L (ref 3.5–5.2)
PROT SERPL-MCNC: 7.2 G/DL (ref 6–8.5)
PROT UR QL STRIP: ABNORMAL
PROTHROMBIN TIME: 13.1 SECONDS (ref 12–15.1)
RBC # BLD AUTO: 4.55 10*6/MM3 (ref 4.14–5.8)
RBC # UR: ABNORMAL /HPF
REF LAB TEST METHOD: ABNORMAL
RH BLD: NEGATIVE
SODIUM BLD-SCNC: 140 MMOL/L (ref 136–145)
SP GR UR STRIP: >=1.03 (ref 1–1.03)
SQUAMOUS #/AREA URNS HPF: ABNORMAL /HPF
UROBILINOGEN UR QL STRIP: ABNORMAL
WBC NRBC COR # BLD: 4.75 10*3/MM3 (ref 3.4–10.8)
WBC UR QL AUTO: ABNORMAL /HPF

## 2019-11-07 PROCEDURE — 85610 PROTHROMBIN TIME: CPT | Performed by: PHYSICIAN ASSISTANT

## 2019-11-07 PROCEDURE — 83036 HEMOGLOBIN GLYCOSYLATED A1C: CPT | Performed by: PHYSICIAN ASSISTANT

## 2019-11-07 PROCEDURE — 85025 COMPLETE CBC W/AUTO DIFF WBC: CPT | Performed by: PHYSICIAN ASSISTANT

## 2019-11-07 PROCEDURE — 86900 BLOOD TYPING SEROLOGIC ABO: CPT | Performed by: ORTHOPAEDIC SURGERY

## 2019-11-07 PROCEDURE — 93005 ELECTROCARDIOGRAM TRACING: CPT

## 2019-11-07 PROCEDURE — 86901 BLOOD TYPING SEROLOGIC RH(D): CPT | Performed by: ORTHOPAEDIC SURGERY

## 2019-11-07 PROCEDURE — 81001 URINALYSIS AUTO W/SCOPE: CPT | Performed by: PHYSICIAN ASSISTANT

## 2019-11-07 PROCEDURE — 71046 X-RAY EXAM CHEST 2 VIEWS: CPT

## 2019-11-07 PROCEDURE — 36415 COLL VENOUS BLD VENIPUNCTURE: CPT

## 2019-11-07 PROCEDURE — 80053 COMPREHEN METABOLIC PANEL: CPT | Performed by: PHYSICIAN ASSISTANT

## 2019-11-07 PROCEDURE — 87081 CULTURE SCREEN ONLY: CPT | Performed by: PHYSICIAN ASSISTANT

## 2019-11-07 PROCEDURE — 85730 THROMBOPLASTIN TIME PARTIAL: CPT | Performed by: PHYSICIAN ASSISTANT

## 2019-11-07 ASSESSMENT — HOOS JR
HOOS JR SCORE: 21
HOOS JR SCORE: 20.805

## 2019-11-08 ENCOUNTER — TELEPHONE (OUTPATIENT)
Dept: SOCIAL WORK | Facility: HOSPITAL | Age: 59
End: 2019-11-08

## 2019-11-08 LAB — MRSA SPEC QL CULT: NORMAL

## 2019-11-08 NOTE — TELEPHONE ENCOUNTER
CM attempted x 2 per phone to reach patient concerning  Pre surgical hip replacement.  Unable to contact

## 2019-11-12 ENCOUNTER — ANESTHESIA (OUTPATIENT)
Dept: PERIOP | Facility: HOSPITAL | Age: 59
End: 2019-11-12

## 2019-11-12 ENCOUNTER — APPOINTMENT (OUTPATIENT)
Dept: GENERAL RADIOLOGY | Facility: HOSPITAL | Age: 59
End: 2019-11-12

## 2019-11-12 ENCOUNTER — ANESTHESIA EVENT (OUTPATIENT)
Dept: PERIOP | Facility: HOSPITAL | Age: 59
End: 2019-11-12

## 2019-11-12 ENCOUNTER — HOSPITAL ENCOUNTER (INPATIENT)
Facility: HOSPITAL | Age: 59
LOS: 2 days | Discharge: HOME HEALTH | End: 2019-11-14
Attending: ORTHOPAEDIC SURGERY | Admitting: ORTHOPAEDIC SURGERY

## 2019-11-12 DIAGNOSIS — M16.11 PRIMARY OSTEOARTHRITIS OF RIGHT HIP: ICD-10-CM

## 2019-11-12 DIAGNOSIS — Z78.9 IMPAIRED MOBILITY AND ADLS: Primary | ICD-10-CM

## 2019-11-12 DIAGNOSIS — Z74.09 IMPAIRED MOBILITY AND ADLS: Primary | ICD-10-CM

## 2019-11-12 DIAGNOSIS — Z96.641 S/P HIP REPLACEMENT, RIGHT: ICD-10-CM

## 2019-11-12 LAB
ABO GROUP BLD: NORMAL
BLD GP AB SCN SERPL QL: NEGATIVE
RH BLD: NEGATIVE
T&S EXPIRATION DATE: NORMAL

## 2019-11-12 PROCEDURE — 25010000003 CEFAZOLIN PER 500 MG: Performed by: ORTHOPAEDIC SURGERY

## 2019-11-12 PROCEDURE — 27130 TOTAL HIP ARTHROPLASTY: CPT | Performed by: ORTHOPAEDIC SURGERY

## 2019-11-12 PROCEDURE — C1776 JOINT DEVICE (IMPLANTABLE): HCPCS | Performed by: ORTHOPAEDIC SURGERY

## 2019-11-12 PROCEDURE — 97165 OT EVAL LOW COMPLEX 30 MIN: CPT

## 2019-11-12 PROCEDURE — 86900 BLOOD TYPING SEROLOGIC ABO: CPT | Performed by: PHYSICIAN ASSISTANT

## 2019-11-12 PROCEDURE — 25010000002 HYDROMORPHONE PER 4 MG: Performed by: NURSE ANESTHETIST, CERTIFIED REGISTERED

## 2019-11-12 PROCEDURE — 25010000002 FENTANYL CITRATE (PF) 250 MCG/5ML SOLUTION: Performed by: NURSE ANESTHETIST, CERTIFIED REGISTERED

## 2019-11-12 PROCEDURE — 25010000002 HYDROMORPHONE 1 MG/ML SOLUTION: Performed by: ORTHOPAEDIC SURGERY

## 2019-11-12 PROCEDURE — 25010000003 CEFAZOLIN SODIUM-DEXTROSE 1-4 GM-%(50ML) RECONSTITUTED SOLUTION: Performed by: ORTHOPAEDIC SURGERY

## 2019-11-12 PROCEDURE — 86901 BLOOD TYPING SEROLOGIC RH(D): CPT | Performed by: PHYSICIAN ASSISTANT

## 2019-11-12 PROCEDURE — 25010000002 DEXAMETHASONE SODIUM PHOSPHATE 10 MG/ML SOLUTION: Performed by: NURSE ANESTHETIST, CERTIFIED REGISTERED

## 2019-11-12 PROCEDURE — 25010000002 PROPOFOL 200 MG/20ML EMULSION: Performed by: NURSE ANESTHETIST, CERTIFIED REGISTERED

## 2019-11-12 PROCEDURE — 0SR90JA REPLACEMENT OF RIGHT HIP JOINT WITH SYNTHETIC SUBSTITUTE, UNCEMENTED, OPEN APPROACH: ICD-10-PCS | Performed by: ORTHOPAEDIC SURGERY

## 2019-11-12 PROCEDURE — 72170 X-RAY EXAM OF PELVIS: CPT

## 2019-11-12 PROCEDURE — 97161 PT EVAL LOW COMPLEX 20 MIN: CPT

## 2019-11-12 PROCEDURE — 25010000002 DEXAMETHASONE PER 1 MG: Performed by: NURSE ANESTHETIST, CERTIFIED REGISTERED

## 2019-11-12 PROCEDURE — 25010000003 CEFAZOLIN SODIUM-DEXTROSE 2-3 GM-%(50ML) RECONSTITUTED SOLUTION: Performed by: PHYSICIAN ASSISTANT

## 2019-11-12 PROCEDURE — 86850 RBC ANTIBODY SCREEN: CPT | Performed by: PHYSICIAN ASSISTANT

## 2019-11-12 PROCEDURE — 25010000002 ONDANSETRON PER 1 MG: Performed by: NURSE ANESTHETIST, CERTIFIED REGISTERED

## 2019-11-12 PROCEDURE — 36415 COLL VENOUS BLD VENIPUNCTURE: CPT | Performed by: PHYSICIAN ASSISTANT

## 2019-11-12 PROCEDURE — 25010000002 HYDROMORPHONE 1 MG/ML SOLUTION

## 2019-11-12 DEVICE — STEM FEM/HIP TAPERLOC COMPL DIST/REDUC PPS OFFST/STD SZ11: Type: IMPLANTABLE DEVICE | Site: HIP | Status: FUNCTIONAL

## 2019-11-12 DEVICE — IMPLANTABLE DEVICE: Type: IMPLANTABLE DEVICE | Site: HIP | Status: FUNCTIONAL

## 2019-11-12 DEVICE — ADAPT HIP BIOLOX OPTN TYPE1 TPR STD: Type: IMPLANTABLE DEVICE | Site: HIP | Status: FUNCTIONAL

## 2019-11-12 DEVICE — CAP CERAM HD HIP UPCHRG: Type: IMPLANTABLE DEVICE | Status: FUNCTIONAL

## 2019-11-12 DEVICE — CAP HIP TM UPCHRG: Type: IMPLANTABLE DEVICE | Status: FUNCTIONAL

## 2019-11-12 DEVICE — SCRW DOME G7 LP 6.5X30MM: Type: IMPLANTABLE DEVICE | Site: HIP | Status: FUNCTIONAL

## 2019-11-12 DEVICE — TOTAL HIP PRIMARY: Type: IMPLANTABLE DEVICE | Status: FUNCTIONAL

## 2019-11-12 DEVICE — SHLL ACET OSSEOTI G7 3H SZD 50MM: Type: IMPLANTABLE DEVICE | Site: HIP | Status: FUNCTIONAL

## 2019-11-12 DEVICE — SCRW DOME G7 LP 6.5X25MM: Type: IMPLANTABLE DEVICE | Site: HIP | Status: FUNCTIONAL

## 2019-11-12 DEVICE — HD FEM/HIP G7 BIOLOX/DELTA OPTN 36MM: Type: IMPLANTABLE DEVICE | Site: HIP | Status: FUNCTIONAL

## 2019-11-12 RX ORDER — CEFAZOLIN SODIUM 2 G/50ML
2 SOLUTION INTRAVENOUS
Status: COMPLETED | OUTPATIENT
Start: 2019-11-12 | End: 2019-11-12

## 2019-11-12 RX ORDER — ROCURONIUM BROMIDE 10 MG/ML
INJECTION, SOLUTION INTRAVENOUS AS NEEDED
Status: DISCONTINUED | OUTPATIENT
Start: 2019-11-12 | End: 2019-11-12 | Stop reason: SURG

## 2019-11-12 RX ORDER — GABAPENTIN 100 MG/1
200 CAPSULE ORAL NIGHTLY
Status: DISCONTINUED | OUTPATIENT
Start: 2019-11-12 | End: 2019-11-14 | Stop reason: HOSPADM

## 2019-11-12 RX ORDER — FENTANYL CITRATE 50 UG/ML
INJECTION, SOLUTION INTRAMUSCULAR; INTRAVENOUS AS NEEDED
Status: DISCONTINUED | OUTPATIENT
Start: 2019-11-12 | End: 2019-11-12 | Stop reason: SURG

## 2019-11-12 RX ORDER — SODIUM CHLORIDE 0.9 % (FLUSH) 0.9 %
3 SYRINGE (ML) INJECTION EVERY 12 HOURS SCHEDULED
Status: DISCONTINUED | OUTPATIENT
Start: 2019-11-12 | End: 2019-11-14 | Stop reason: HOSPADM

## 2019-11-12 RX ORDER — PROPOFOL 10 MG/ML
INJECTION, EMULSION INTRAVENOUS AS NEEDED
Status: DISCONTINUED | OUTPATIENT
Start: 2019-11-12 | End: 2019-11-12 | Stop reason: SURG

## 2019-11-12 RX ORDER — DEXAMETHASONE SODIUM PHOSPHATE 10 MG/ML
INJECTION, SOLUTION INTRAMUSCULAR; INTRAVENOUS AS NEEDED
Status: DISCONTINUED | OUTPATIENT
Start: 2019-11-12 | End: 2019-11-12 | Stop reason: SURG

## 2019-11-12 RX ORDER — FONDAPARINUX SODIUM 2.5 MG/.5ML
2.5 INJECTION SUBCUTANEOUS
Status: DISCONTINUED | OUTPATIENT
Start: 2019-11-13 | End: 2019-11-14 | Stop reason: HOSPADM

## 2019-11-12 RX ORDER — ONDANSETRON 4 MG/1
4 TABLET, FILM COATED ORAL EVERY 6 HOURS PRN
Status: DISCONTINUED | OUTPATIENT
Start: 2019-11-12 | End: 2019-11-14 | Stop reason: HOSPADM

## 2019-11-12 RX ORDER — DEXAMETHASONE SODIUM PHOSPHATE 4 MG/ML
INJECTION, SOLUTION INTRA-ARTICULAR; INTRALESIONAL; INTRAMUSCULAR; INTRAVENOUS; SOFT TISSUE AS NEEDED
Status: DISCONTINUED | OUTPATIENT
Start: 2019-11-12 | End: 2019-11-12 | Stop reason: SURG

## 2019-11-12 RX ORDER — SODIUM CHLORIDE 0.9 % (FLUSH) 0.9 %
10 SYRINGE (ML) INJECTION AS NEEDED
Status: DISCONTINUED | OUTPATIENT
Start: 2019-11-12 | End: 2019-11-12 | Stop reason: HOSPADM

## 2019-11-12 RX ORDER — CEFAZOLIN SODIUM 1 G/50ML
1 SOLUTION INTRAVENOUS EVERY 8 HOURS
Status: COMPLETED | OUTPATIENT
Start: 2019-11-12 | End: 2019-11-13

## 2019-11-12 RX ORDER — ONDANSETRON 2 MG/ML
INJECTION INTRAMUSCULAR; INTRAVENOUS AS NEEDED
Status: DISCONTINUED | OUTPATIENT
Start: 2019-11-12 | End: 2019-11-12 | Stop reason: SURG

## 2019-11-12 RX ORDER — ONDANSETRON 2 MG/ML
4 INJECTION INTRAMUSCULAR; INTRAVENOUS EVERY 6 HOURS PRN
Status: DISCONTINUED | OUTPATIENT
Start: 2019-11-12 | End: 2019-11-14 | Stop reason: HOSPADM

## 2019-11-12 RX ORDER — BISACODYL 10 MG
10 SUPPOSITORY, RECTAL RECTAL DAILY PRN
Status: DISCONTINUED | OUTPATIENT
Start: 2019-11-12 | End: 2019-11-14 | Stop reason: HOSPADM

## 2019-11-12 RX ORDER — TIZANIDINE 4 MG/1
4 TABLET ORAL NIGHTLY PRN
Status: DISCONTINUED | OUTPATIENT
Start: 2019-11-12 | End: 2019-11-14 | Stop reason: HOSPADM

## 2019-11-12 RX ORDER — SODIUM CHLORIDE, SODIUM LACTATE, POTASSIUM CHLORIDE, CALCIUM CHLORIDE 600; 310; 30; 20 MG/100ML; MG/100ML; MG/100ML; MG/100ML
100 INJECTION, SOLUTION INTRAVENOUS CONTINUOUS
Status: DISCONTINUED | OUTPATIENT
Start: 2019-11-12 | End: 2019-11-14 | Stop reason: HOSPADM

## 2019-11-12 RX ORDER — OXYCODONE HYDROCHLORIDE 15 MG/1
15 TABLET ORAL EVERY 6 HOURS PRN
Status: DISCONTINUED | OUTPATIENT
Start: 2019-11-12 | End: 2019-11-14 | Stop reason: HOSPADM

## 2019-11-12 RX ORDER — ATORVASTATIN CALCIUM 10 MG/1
10 TABLET, FILM COATED ORAL DAILY
Status: DISCONTINUED | OUTPATIENT
Start: 2019-11-12 | End: 2019-11-14 | Stop reason: HOSPADM

## 2019-11-12 RX ORDER — DEXAMETHASONE SODIUM PHOSPHATE 10 MG/ML
INJECTION, SOLUTION INTRAMUSCULAR; INTRAVENOUS
Status: COMPLETED
Start: 2019-11-12 | End: 2019-11-12

## 2019-11-12 RX ORDER — SODIUM CHLORIDE 0.9 % (FLUSH) 0.9 %
1-10 SYRINGE (ML) INJECTION AS NEEDED
Status: DISCONTINUED | OUTPATIENT
Start: 2019-11-12 | End: 2019-11-14 | Stop reason: HOSPADM

## 2019-11-12 RX ORDER — SODIUM CHLORIDE, SODIUM LACTATE, POTASSIUM CHLORIDE, CALCIUM CHLORIDE 600; 310; 30; 20 MG/100ML; MG/100ML; MG/100ML; MG/100ML
1000 INJECTION, SOLUTION INTRAVENOUS CONTINUOUS
Status: DISCONTINUED | OUTPATIENT
Start: 2019-11-12 | End: 2019-11-12

## 2019-11-12 RX ORDER — BUPIVACAINE HYDROCHLORIDE 2.5 MG/ML
INJECTION, SOLUTION EPIDURAL; INFILTRATION; INTRACAUDAL AS NEEDED
Status: DISCONTINUED | OUTPATIENT
Start: 2019-11-12 | End: 2019-11-12 | Stop reason: SURG

## 2019-11-12 RX ORDER — BUPIVACAINE HYDROCHLORIDE 2.5 MG/ML
INJECTION, SOLUTION EPIDURAL; INFILTRATION; INTRACAUDAL
Status: COMPLETED
Start: 2019-11-12 | End: 2019-11-12

## 2019-11-12 RX ORDER — NALOXONE HCL 0.4 MG/ML
0.1 VIAL (ML) INJECTION
Status: DISCONTINUED | OUTPATIENT
Start: 2019-11-12 | End: 2019-11-14 | Stop reason: HOSPADM

## 2019-11-12 RX ORDER — HYDROMORPHONE HCL 110MG/55ML
PATIENT CONTROLLED ANALGESIA SYRINGE INTRAVENOUS AS NEEDED
Status: DISCONTINUED | OUTPATIENT
Start: 2019-11-12 | End: 2019-11-12 | Stop reason: SURG

## 2019-11-12 RX ORDER — DOCUSATE SODIUM 100 MG/1
100 CAPSULE, LIQUID FILLED ORAL 2 TIMES DAILY PRN
Status: DISCONTINUED | OUTPATIENT
Start: 2019-11-12 | End: 2019-11-14 | Stop reason: HOSPADM

## 2019-11-12 RX ORDER — ACETAMINOPHEN 325 MG/1
650 TABLET ORAL EVERY 6 HOURS PRN
Status: DISCONTINUED | OUTPATIENT
Start: 2019-11-12 | End: 2019-11-14 | Stop reason: HOSPADM

## 2019-11-12 RX ADMIN — CEFAZOLIN SODIUM 1 G: 1 SOLUTION INTRAVENOUS at 16:36

## 2019-11-12 RX ADMIN — SODIUM CHLORIDE, POTASSIUM CHLORIDE, SODIUM LACTATE AND CALCIUM CHLORIDE 1000 ML: 600; 310; 30; 20 INJECTION, SOLUTION INTRAVENOUS at 06:52

## 2019-11-12 RX ADMIN — Medication 0.5 MG: at 11:13

## 2019-11-12 RX ADMIN — HYDROMORPHONE HYDROCHLORIDE 1 MG: 1 INJECTION, SOLUTION INTRAMUSCULAR; INTRAVENOUS; SUBCUTANEOUS at 21:17

## 2019-11-12 RX ADMIN — ROCURONIUM BROMIDE 50 MG: 10 INJECTION INTRAVENOUS at 08:37

## 2019-11-12 RX ADMIN — PROPOFOL 180 MG: 10 INJECTION, EMULSION INTRAVENOUS at 08:23

## 2019-11-12 RX ADMIN — DEXAMETHASONE SODIUM PHOSPHATE 10 MG: 10 INJECTION, SOLUTION INTRAMUSCULAR; INTRAVENOUS at 11:20

## 2019-11-12 RX ADMIN — TRANEXAMIC ACID 1000 MG: 100 INJECTION, SOLUTION INTRAVENOUS at 08:37

## 2019-11-12 RX ADMIN — FENTANYL CITRATE 50 MCG: 50 INJECTION, SOLUTION INTRAMUSCULAR; INTRAVENOUS at 08:27

## 2019-11-12 RX ADMIN — HYDROMORPHONE HYDROCHLORIDE 0.5 MG: 2 INJECTION, SOLUTION INTRAMUSCULAR; INTRAVENOUS; SUBCUTANEOUS at 09:29

## 2019-11-12 RX ADMIN — BUPIVACAINE HYDROCHLORIDE 50 ML: 2.5 INJECTION, SOLUTION EPIDURAL; INFILTRATION; INTRACAUDAL; PERINEURAL at 11:20

## 2019-11-12 RX ADMIN — DEXAMETHASONE SODIUM PHOSPHATE 8 MG: 4 INJECTION, SOLUTION INTRAMUSCULAR; INTRAVENOUS at 08:44

## 2019-11-12 RX ADMIN — HYDROMORPHONE HYDROCHLORIDE 0.5 MG: 2 INJECTION, SOLUTION INTRAMUSCULAR; INTRAVENOUS; SUBCUTANEOUS at 09:34

## 2019-11-12 RX ADMIN — TRANEXAMIC ACID 1000 MG: 100 INJECTION, SOLUTION INTRAVENOUS at 09:30

## 2019-11-12 RX ADMIN — ONDANSETRON 4 MG: 2 INJECTION INTRAMUSCULAR; INTRAVENOUS at 08:44

## 2019-11-12 RX ADMIN — SODIUM CHLORIDE, PRESERVATIVE FREE 10 ML: 5 INJECTION INTRAVENOUS at 17:34

## 2019-11-12 RX ADMIN — CEFAZOLIN SODIUM 2 G: 2 SOLUTION INTRAVENOUS at 08:35

## 2019-11-12 RX ADMIN — HYDROMORPHONE HYDROCHLORIDE 0.5 MG: 1 INJECTION, SOLUTION INTRAMUSCULAR; INTRAVENOUS; SUBCUTANEOUS at 11:31

## 2019-11-12 RX ADMIN — SODIUM CHLORIDE, POTASSIUM CHLORIDE, SODIUM LACTATE AND CALCIUM CHLORIDE 100 ML/HR: 600; 310; 30; 20 INJECTION, SOLUTION INTRAVENOUS at 11:42

## 2019-11-12 RX ADMIN — SODIUM CHLORIDE, POTASSIUM CHLORIDE, SODIUM LACTATE AND CALCIUM CHLORIDE: 600; 310; 30; 20 INJECTION, SOLUTION INTRAVENOUS at 09:15

## 2019-11-12 RX ADMIN — HYDROMORPHONE HYDROCHLORIDE 1 MG: 1 INJECTION, SOLUTION INTRAMUSCULAR; INTRAVENOUS; SUBCUTANEOUS at 17:34

## 2019-11-12 RX ADMIN — SODIUM CHLORIDE, PRESERVATIVE FREE 3 ML: 5 INJECTION INTRAVENOUS at 13:45

## 2019-11-12 RX ADMIN — HYDROMORPHONE HYDROCHLORIDE 0.5 MG: 1 INJECTION, SOLUTION INTRAMUSCULAR; INTRAVENOUS; SUBCUTANEOUS at 11:13

## 2019-11-12 RX ADMIN — FAMOTIDINE 20 MG: 10 INJECTION, SOLUTION INTRAVENOUS at 06:55

## 2019-11-12 RX ADMIN — HYDROMORPHONE HYDROCHLORIDE 0.5 MG: 2 INJECTION, SOLUTION INTRAMUSCULAR; INTRAVENOUS; SUBCUTANEOUS at 09:20

## 2019-11-12 RX ADMIN — OXYCODONE HYDROCHLORIDE 15 MG: 15 TABLET ORAL at 20:18

## 2019-11-12 RX ADMIN — OXYCODONE HYDROCHLORIDE 15 MG: 15 TABLET ORAL at 13:44

## 2019-11-12 RX ADMIN — FENTANYL CITRATE 100 MCG: 50 INJECTION, SOLUTION INTRAMUSCULAR; INTRAVENOUS at 08:23

## 2019-11-12 RX ADMIN — HYDROMORPHONE HYDROCHLORIDE 0.5 MG: 2 INJECTION, SOLUTION INTRAMUSCULAR; INTRAVENOUS; SUBCUTANEOUS at 09:24

## 2019-11-12 RX ADMIN — Medication 0.5 MG: at 11:31

## 2019-11-12 RX ADMIN — FENTANYL CITRATE 100 MCG: 50 INJECTION, SOLUTION INTRAMUSCULAR; INTRAVENOUS at 08:18

## 2019-11-12 RX ADMIN — GABAPENTIN 200 MG: 100 CAPSULE ORAL at 21:09

## 2019-11-12 NOTE — THERAPY EVALUATION
"Acute Care - Occupational Therapy Initial Evaluation  [facility]     Patient Name: Rory Lal  : 1960  MRN: [id number]  Today's Date: 2019  Onset of Illness/Injury or Date of Surgery: 19  Date of Referral to OT: 19  Referring Physician: Gabi Bowden    Admit Date: 2019       ICD-10-CM ICD-9-CM   1. Impaired mobility and ADLs Z74.09 799.89   2. Primary osteoarthritis of right hip M16.11 715.15     Patient Active Problem List   Diagnosis   • History of fusion of lumbar spine   • Arthralgia of hip, right   • Primary osteoarthritis of right hip     Past Medical History:   Diagnosis Date   • Arthritis    • Chronic left-sided low back pain    • Chronic narcotic use    • Chronic right hip pain    • History of chest pain 2019    Patient denies, noted by Dr. Gil on 2015   • History of MRSA infection 2015    Per pt, Left foot; treated with antibiotics and no reoccurence    • History of palpitations 2019    Patient denies, noted by Dr. Gil on 2015   • Hx of colonic polyp    • Hyperlipidemia    • Hypertension     Patient denies, noted by Dr. 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  yes  -        Onset of Illness/Injury or Date of Surgery  11/12/19  -        Referring Physician  Gabi Bowden  -        Patient Observations  alert;cooperative;agree to therapy  -        Patient/Family Observations  Pt alone  -        General Observations of Patient  Pt received supine in bed, abduction pillow in place  -        Prior Level of Function  independent:;community mobility;ADL's  -        Equipment Currently Used at Home  cane, straight;crutches, axillary  -        Pertinent History of Current Functional Problem  OA R hip s/p R EDMAR  -        Existing Precautions/Restrictions  fall;right;hip, posterior  -        Risks Reviewed  patient:;increased discomfort  -        Benefits Reviewed  patient:;improve function;increase independence;increase strength  -           Relationship/Environment    Primary Source of Support/Comfort  spouse  -        Lives With  spouse;child(laila), adult  -           Resource/Environmental Concerns    Current Living Arrangements  home/apartment/condo  -           Home Main Entrance    Number of Stairs, Main Entrance  five  -        Stair Railings, Main Entrance  railing on left side (ascending)  -           Stairs Within Home, Primary    Stairs, Within Home, Primary  to upper level  -        Number of Stairs, Within Home, Primary  four  -        Stair Railings, Within Home, Primary  railing on left side (ascending)  -           Cognitive Assessment/Intervention- PT/OT    Orientation Status (Cognition)  oriented x 4  -        Follows Commands (Cognition)  WFL  -           Safety Issues, Functional Mobility    Safety Issues Affecting Function (Mobility)  impulsivity;safety  precaution awareness;safety precautions follow-through/compliance  -        Impairments Affecting Function (Mobility)  balance;strength;pain  -           Mobility Assessment/Treatment    Extremity Weight-bearing Status  right lower extremity  -        Right Lower Extremity (Weight-bearing Status)  weight-bearing as tolerated (WBAT)  -           Bed Mobility Assessment/Treatment    Bed Mobility Assessment/Treatment  supine-sit  -        Supine-Sit [place] (Bed Mobility)  minimum assist (75% patient effort)  -        Assistive Device (Bed Mobility)  head of bed elevated;bed rails  -           Functional Mobility    Functional Mobility- Ind. Level  contact guard assist;verbal cues required  -        Functional Mobility- Device  rolling walker  -        Functional Mobility-Distance (Feet)  104  -           Transfer Assessment/Treatment    Transfer Assessment/Treatment  sit-stand transfer;stand-sit transfer  -           Sit-Stand Transfer    Sit-Stand [place] (Transfers)  minimum assist (75% patient effort)  -        Assistive Device (Sit-Stand Transfers)  walker, front-wheeled  -           Stand-Sit Transfer    Stand-Sit [place] (Transfers)  minimum assist (75% patient effort)  -        Assistive Device (Stand-Sit Transfers)  walker, front-wheeled  -           ADL Assessment/Intervention    BADL Assessment/Intervention  bathing;upper body dressing;lower body dressing;grooming;feeding;toileting  -           Bathing Assessment/Intervention    Bathing [place] Level  moderate assist (50% patient effort)  -           Upper Body Dressing Assessment/Training    Upper Body Dressing [place] Level  set up  -           Lower Body Dressing Assessment/Training    Lower Body Dressing [place] Level  maximum assist (25% patient effort)  -           Grooming Assessment/Training    [place] Level (Grooming)  set up  -           Self-Feeding Assessment/Training     [place] Level (Feeding)  independent  -           Toileting Assessment/Training    [place] Level (Toileting)  contact guard assist  -           BADL Safety/Performance    Impairments, BADL Safety/Performance  balance;endurance/activity tolerance;strength  -           General ROM    GENERAL ROM COMMENTS  BUE WF  -           MMT (Manual Muscle Testing)    General MMT Comments  [facility]  -           Positioning and Restraints    Pre-Treatment Position  in bed  -        Post Treatment Position  chair  -        In Chair  sitting;call light within reach;encouraged to call for assist  -           Pain Assessment    Additional Documentation  Pain Scale: Numbers Pre/Post-Treatment (Group)  -           Pain Scale: Numbers Pre/Post-Treatment    Pain Scale: Numbers, Pretreatment  8/10  -        Pain Scale: Numbers, Post-Treatment  8/10  -        Pain Location - Side  Right  -        Pain Location - Orientation  lower  -        Pain Location  extremity  -        Pain Intervention(s)  Repositioned;Ambulation/increased activity  -           Wound 11/12/19 0928 Right hip Incision    Wound - Properties Group Date first assessed: 11/12/19  -PB Time first assessed: 0928  -PB Side: Right  -PB Location: hip  -PB Primary Wound Type: Incision  -PB       Coping    Observed Emotional State  accepting;cooperative  -        Verbalized Emotional State  acceptance  -           Plan of Care Review    Plan of Care Reviewed With  patient  -           Clinical Impression (OT)    Date of Referral to OT  11/12/19  -        OT Diagnosis  ADL decline  -        Patient/Family Goals Statement (OT Eval)  Pt wants to d/c home   -        Criteria for Skilled Therapeutic Interventions Met (OT Eval)  yes;treatment indicated  -        Rehab Potential (OT Eval)  good, to achieve stated therapy goals  -        Therapy Frequency (OT Eval)  daily Mon-Fri  -        Care Plan Review (OT)  evaluation/treatment  results reviewed;patient/other agree to care plan  -        Anticipated Discharge Disposition (OT)  home with home health  -           OT Goals    Bed Mobility Goal Selection (OT)  bed mobility, OT goal 1  -        Transfer Goal Selection (OT)  transfer, OT goal 1  -        Dressing Goal Selection (OT)  dressing, OT goal 1  -        Strength Goal Selection (OT)  strength, OT goal 1  -        Functional Mobility Goal Selection (OT)  functional mobility, OT goal 1  -        Additional Documentation  Strength Goal Selection (OT) (Row);Functional Mobility Selection (OT) (Row)  -           Bed Mobility Goal 1 (OT)    Activity/Assistive Device (Bed Mobility Goal 1, OT)  supine to sit  -        [place] Level/Cues Needed (Bed Mobility Goal 1, OT)  contact guard assist  -        Time Frame (Bed Mobility Goal 1, OT)  by discharge  -        Progress/Outcomes (Bed Mobility Goal 1, OT)  goal ongoing  -           Transfer Goal 1 (OT)    Activity/Assistive Device (Transfer Goal 1, OT)  sit-to-stand/stand-to-sit;walker, rolling  -        [place] Level/Cues Needed (Transfer Goal 1, OT)  supervision required  -        Time Frame (Transfer Goal 1, OT)  by discharge  -        Progress/Outcome (Transfer Goal 1, OT)  goal ongoing  -           Dressing Goal 1 (OT)    Activity/Assistive Device (Dressing Goal 1, OT)  lower body dressing;reacher;sock-aid  -        [place]/Cues Needed (Dressing Goal 1, OT)  contact guard assist  -        Time Frame (Dressing Goal 1, OT)  by discharge  -        Progress/Outcome (Dressing Goal 1, OT)  goal ongoing  -           Strength Goal 1 (OT)    Strength Goal 1 (OT)  Pt will perform UB strengthening ex using theraband for resistance.   -        Time Frame (Strength Goal 1, OT)  by discharge  -        Progress/Outcome (Strength Goal 1, OT)  goal ongoing  -           Functional Mobility Goal 1 (OT)    Activity/Assistive Device (Functional Mobility  Goal 1, OT)  walker, rolling  -        [place] Level/Cues Needed (Functional Mobility Goal 1, OT)  supervision required  -        Distance Goal 1 (Functional Mobility, OT)  150  -        Time Frame (Functional Mobility Goal 1, OT)  long term goal (LTG);2 weeks  -        Progress/Outcome (Functional Mobility Goal 1, OT)  goal ongoing  -           Living Environment    Home Accessibility  stairs to enter home;stairs within home  -          User Key  (r) = Recorded By, (t) = Taken By, (c) = Cosigned By    Initials Name Effective Dates    Patricia Larkin 03/07/18 -     PB Mary Solares RN 11/07/16 -          Occupational Therapy Education     Title: PT OT SLP Therapies (In Progress)     Topic: Occupational Therapy (In Progress)     Point: ADL training (Done)     Description: Instruct learner(s) on proper safety adaptation and remediation techniques during self care or transfers.   Instruct in proper use of assistive devices.    Learning Progress Summary           Patient Acceptance, E,TB, VU by  at 11/12/2019  4:28 PM    Comment:  Role of OT/POC                               User Key     Initials Effective Dates Name Provider Type Discipline     03/07/18 -  Patricia James Occupational Therapist OT                  OT Recommendation and Plan  Outcome Summary/Treatment Plan (OT)  Anticipated Discharge Disposition (OT): home with home health  Therapy Frequency (OT Eval): daily(Mon-Fri)  Plan of Care Review  Plan of Care Reviewed With: patient  Plan of Care Reviewed With: patient  Outcome Summary: OT evaluation completed today.  Pt presents with pain and decreased independence with self care and functional mobility tasks.  Pt is expected to benefit from skilled OT to improve his strength and independence with ADL tasks    Outcome Measures     Row Name 11/12/19 1383             How much help from another is currently needed...    Putting on and taking off regular lower body clothing?  2  -       Bathing (including washing, rinsing, and drying)  2  -AH      Toileting (which includes using toilet bed pan or urinal)  3  -AH      Putting on and taking off regular upper body clothing  4  -AH      Taking care of personal grooming (such as brushing teeth)  4  -AH      Eating meals  4  -AH      AM-PAC 6 Clicks Score (OT)  19  -         Functional Assessment    Outcome Measure Options  AM-PAC 6 Clicks Daily Activity (OT)  -        User Key  (r) = Recorded By, (t) = Taken By, (c) = Cosigned By    Initials Name Provider Type    Patricia Larkin Occupational Therapist          Time Calculation:   Time Calculation- OT     Row Name 11/12/19 1629             Time Calculation- OT    OT Start Time  1402  -      OT Received On  11/12/19  -      OT Goal Re-Cert Due Date  11/22/19  -        User Key  (r) = Recorded By, (t) = Taken By, (c) = Cosigned By    Initials Name Provider Type    Patricia Larkin Occupational Therapist        Therapy Charges for Today     Code Description Service Date Service Provider Modifiers Qty    [id number]  OT EVAL LOW COMPLEXITY 3 11/12/2019 Patricia James GO 1               Patricia James  11/12/2019

## 2019-11-12 NOTE — OP NOTE
[place] [address] [facility], [address]  [place]  [address] [phone]      OPERATIVE REPORT         PATIENT NAME:  Rory Lal                            YOB: 1960        PREOP DIAGNOSIS:  Right hip advanced end-stage osteoarthritis    POSTOP DIAGNOSIS:  Right hip advanced end-stage osteoarthritis    PROCEDURE:  Right total hip replacement.    SURGEON:   Benny Ashley MD    OPERATIVE TEAM:   Circulator: Mary Solares RN; Selena Costello RN  Scrub Person: Gifty Kolb; Glen Simms    ANESTHETIST:  MELISSA: Miguel Estrella CRNA    ANESTHESIA:   General    FLUIDS:   1600 ml crystalloid    ESTIMATED BLOOD LOSS: 400 ml      SPECIMENS:   Femoral head sent to Pathology.    DRAINS:   None.    FINDINGS: Advanced degenerative arthritis, not bone on bone though with marginal osteophytes, acetabular erosions, mild deformity of femoral head, deformity of acetabulum without migration, calcified labrum with degenerative tears.    HARDWARE:                       Biomet Taperloc Complete press fit total hip replacement  with a #11 stem, 133 degree neck angle, +0 mm neck, 36 mm  ceramic head, 50 mm acetabular cup with two superior  quadrant screws (30 mm, 25 mm) and 10 degree posterior  high wall cross-linked polyethylene liner.      COMPLICATIONS:  None.    DISPOSITION:  Stable to recovery.    INDICATIONS/NARRATIVE:   The patient presents for planned total hip replacement.  The patient has persistent daily pain, limited ambulation and activity intolerance, hip stiffness, deformity, and limitations related to advanced degenerative joint disease of the hip.  Treatment alternatives have been discussed, and the patient wishes to proceed with elective total hip replacement.  Risks and benefits of the proposed procedure have been discussed, and informed consent obtained.  Risks were discussed including but not limited to, anesthesia, infection, nerve/vessel/tendon injury, fracture,  prosthetic wear, loosening or dislocation, DVT, pulmonary embolus, blood loss and the possible need for transfusion.  Arrangements have been made for tranexamic acid IV protocol.  Goals of the procedure include the potential for pain relief, improved hip motion, limb alignment and improved tolerance with ambulation and activities.      Antibiotic prophylaxis was given.  Surgeon site marking and a time out were performed.  Anesthesia was effective and well tolerated.  The hip and leg were prepped and draped in the usual sterile fashion.  The patient was placed in the lateral decubitus position for the procedure, with care taken to pad all areas of the body including a bean bag mold, axillary roll, and fibular head and malleolar padding.      After sterile prep and drape, a curved incision was made centered on the greater trochanter of the hip.  Dissection proceeded in line with the skin incision and through the tensor fascia erendira.  A Charnley self-retaining retractor was placed.  The hip was gently internally rotated and a blunt Cobra was placed under the gluteus medius.  The piriformis tendon and short external rotators were released from the fossa and tagged for subsequent repair.  A T capsulotomy was performed and the capsule was tagged for repair.  Synovial joint fluid expressed and suctioned.  There were marked degenerative changes, cartilage worn down to bone and a deformed femoral head and acetabulum.  Using the neck-cutting template, with the desired slope and position, a neck cut was made with a saw.  The femoral head was extracted from the hip, and sent to pathology as a specimen.      After debridement of the osteophytes, reaming of the acetabulum was performed that provided a concentric acetabular socket for the implant.  A trial cup provided an excellent press fit with no toggling.  Care was taken to ream and place the cup in 15-20 degrees of anteversion and 45-50 degrees of inclination.  The trial  component was removed, and the acetabulum was irrigated copiously.  Then, the actual acetabular shell was impacted in place and obtained an excellent press fit, with good position.  The fixation was reinforced with superior quadrant screws, with standard drilling depth gauge measurement, and placement of screws.  Then, a trial liner was placed onto the cup.    Next, steps were taken to prepare the femur.  A box-cutting osteotome was used to lateralize the entry to the canal and along the greater trochanter.  Sequential broaching with the broach was performed, up to the best-fit sizes, which provided an excellent press fit, no toggling.  Care was taken to broach 15-20 degrees of femoral anteversion.  Next, trial heads and necks were placed to find the best range of motion and stability.  There was smooth, free flexion, full extension of the hip and smooth full external and internal rotation with stability.  The trial components were removed.  The wound and bone surfaces were pulse irrigated with copious antibiotic solution, and then suctioned.  Next, the actual stem was placed, which had an excellent press fit that was in good position.  The trial cup liner was removed and the actual insert was placed.  The actual head was then [place] tapered onto the femoral stem.  A final reduction was performed.  Clinically the leg lengths were equal and there was full range of motion and excellent stability of the hip.  The wound was irrigated copiously with pulsed antibiotic solution and several intervals during the procedure and closure.      Routine closure was performed and consisted of interrupted #1 Vicryl to repair the capsule, #5 non-absorbable Ethibond to repair the piriformis tendon, #1 Vicryl to repair the tensor fascia erendira, 2-0 Vicryl for the deep and superficial subcutaneous layers, and staples for the skin.  A sterile xeroform, gauze and Tegaderm dressing was applied.  An abduction pillow was placed and the patient  was moved supine on the hospital bed.  Anesthesia was effective and well tolerated.  There were no complications of surgery.  The patient was transferred in stable condition to the recovery room and x-rays of the pelvis and hip were requested.

## 2019-11-12 NOTE — ADDENDUM NOTE
Addendum  created 11/12/19 1143 by Shawn Hussein, CRNA    Child order released for a procedure order, Intraprocedure Blocks edited, Intraprocedure Meds edited, Sign clinical note

## 2019-11-12 NOTE — ANESTHESIA PROCEDURE NOTES
Airway  Urgency: elective    Date/Time: 11/12/2019 8:37 AM  Airway not difficult    General Information and Staff    Patient location during procedure: OR  CRNA: Miguel Estrella CRNA    Indications and Patient Condition  Indications for airway management: airway protection    Preoxygenated: yes      Final Airway Details  Final airway type: endotracheal airway      Successful airway: ETT  Cuffed: yes   Successful intubation technique: direct laryngoscopy  Endotracheal tube insertion site: oral  Blade: Agarwal  Blade size: 2  ETT size (mm): 7.5  Cormack-Lehane Classification: grade I - full view of glottis  Placement verified by: chest auscultation and capnometry   Number of attempts at approach: 1

## 2019-11-12 NOTE — ANESTHESIA POSTPROCEDURE EVALUATION
Patient: Rory Lal    Procedure Summary     Date:  11/12/19 Room / Location:  [facility] OR  / [facility] OR    Anesthesia Start:  0823 Anesthesia Stop:  1104    Procedure:  total hip arthroplasty, right (Right Hip) Diagnosis:       Primary osteoarthritis of right hip      (Primary osteoarthritis of right hip [M16.11])    Surgeon:  Syd Ashley MD Provider:  Miguel Estrella CRNA    Anesthesia Type:  general with block ASA Status:  2          Anesthesia Type: general with block  Last vitals  BP   151/87 (11/12/19 0634)   Temp   97.9 °F (36.6 °C) (11/12/19 0634)   Pulse   65 (11/12/19 0634)   Resp   16 (11/12/19 0634)     SpO2   97 % (11/12/19 0634)     Post Anesthesia Care and Evaluation    Patient location during evaluation: PACU  Patient participation: complete - patient participated  Level of consciousness: awake and alert and sleepy but conscious  Pain score: 2  Pain management: adequate  Airway patency: patent  Anesthetic complications: No anesthetic complications  PONV Status: none  Cardiovascular status: acceptable  Respiratory status: acceptable and face mask  Hydration status: acceptable

## 2019-11-12 NOTE — THERAPY EVALUATION
"Patient Name: Rory Lal  : 1960    MRN: [id number]                              Today's Date: 2019       Admit Date: 2019    Visit Dx:     ICD-10-CM ICD-9-CM   1. Primary osteoarthritis of right hip M16.11 715.15     Patient Active Problem List   Diagnosis   • History of fusion of lumbar spine   • Arthralgia of hip, right   • Primary osteoarthritis of right hip     Past Medical History:   Diagnosis Date   • Arthritis    • Chronic left-sided low back pain    • Chronic narcotic use    • Chronic right hip pain    • History of chest pain 2019    Patient denies, noted by Dr. Gil on 2015   • History of MRSA infection 2015    Per pt, Left foot; treated with antibiotics and no reoccurence    • History of palpitations 2019    Patient denies, noted by Dr. Gil on 2015   • Hx of colonic polyp    • Hyperlipidemia    • Hypertension     Patient denies, noted by Dr. Gil 2015   • Impaired functional mobility, balance, gait, and endurance    • Impaired gait     R/T right hip pain    • Insomnia    • Lumbosacral disc disease    • Muscle spasm    • Neuropathy    • Non compliance w medication regimen     Patient reported \"I should take cholesterol medicine but I don't.\"    • Prehypertension    • RSD (reflex sympathetic dystrophy) 2015    MD note    • Smokeless tobacco use    • Use of cane as ambulatory aid    • Wears glasses     reading     Past Surgical History:   Procedure Laterality Date   • BACK SURGERY      S1-L5 fusion   • COLONOSCOPY     • DIGIT REATTACHMENT Left     Injury from construction work     • HERNIA REPAIR Right     Inguinal      General Information     Row Name 19 1403          PT Evaluation Time/Intention    Document Type  evaluation  (Pended)   -MR     Mode of Treatment  physical therapy  (Pended)   -MR     Row Name 19 0472          General Information    Patient Profile Reviewed?  yes  (Pended)   -MR     Prior Level of " Function  independent:;community mobility;all household mobility  (Pended)   -MR     Existing Precautions/Restrictions  fall;hip, posterior  (Pended)   -MR     Barriers to Rehab  none identified  (Pended)   -MR [place] Name 11/12/19 1403          Relationship/Environment    Lives With  child(laila), dependent;spouse  (Pended)   -MR [place] Name 11/12/19 1403          Resource/Environmental Concerns    Current Living Arrangements  home/apartment/condo  (Pended)   -MR [place] Name 11/12/19 1403          Home Main Entrance    Number of Stairs, Main Entrance  five  (Pended)   -MR     Stair Railings, Main Entrance  railing on left side (ascending)  (Pended)   -MR [place] Name 11/12/19 1403          Stairs Within Home, Primary    Number of Stairs, Within Home, Primary  four  (Pended)   -MR     Stair Railings, Within Home, Primary  railing on left side (ascending)  (Pended)   -MR [place] Name 11/12/19 1403          Cognitive Assessment/Intervention- PT/OT    Orientation Status (Cognition)  oriented x 4  (Pended)   -MR [place] Name 11/12/19 1403          Safety Issues, Functional Mobility    Safety Issues Affecting Function (Mobility)  impulsivity;safety precautions follow-through/compliance;safety precaution awareness;at risk behavior observed;awareness of need for assistance;insight into deficits/self awareness  (Pended)   -MR     Impairments Affecting Function (Mobility)  balance;strength;endurance/activity tolerance;pain  (Pended)   -MR       User Key  (r) = Recorded By, (t) = Taken By, (c) = Cosigned By    Initials Name Provider Type    MR Sylvie De La Garza, PT Student PT Student        Mobility     Row Name 11/12/19 1403          Bed Mobility Assessment/Treatment    Bed Mobility Assessment/Treatment  supine-sit  (Pended)   -MR     Supine-Sit [place] (Bed Mobility)  minimum assist (75% patient effort);verbal cues  (Pended)   -MR     Assistive Device (Bed Mobility)  bed rails;head of bed elevated  (Pended)   -MR [place]  Name 11/12/19 1403          Sit-Stand Transfer    Sit-Stand [place] (Transfers)  minimum assist (75% patient effort);verbal cues  (Pended)   -MR     Assistive Device (Sit-Stand Transfers)  walker, front-wheeled  (Pended)   -MR     Row Name 11/12/19 1403          Gait/Stairs Assessment/Training    Gait/Stairs Assessment/Training  gait/ambulation assistive device  (Pended)   -MR     [place] Level (Gait)  verbal cues;minimum assist (75% patient effort)  (Pended)   -MR     Assistive Device (Gait)  walker, front-wheeled  (Pended)   -MR     Distance in Feet (Gait)  104'  (Pended)   -MR     Pattern (Gait)  step-through  (Pended)   -MR     Deviations/Abnormal Patterns (Gait)  festinating/shuffling;princess decreased;gait speed decreased;right sided deviations  (Pended)   -MR     Bilateral Gait Deviations  heel strike decreased  (Pended)   -MR     Right Sided Gait Deviations  weight shift ability decreased  (Pended)  Due to right posterior hip surgery   -MR     Row Name 11/12/19 1403          Mobility Assessment/Intervention    Extremity Weight-bearing Status  right lower extremity  (Pended)   -MR     Right Lower Extremity (Weight-bearing Status)  weight-bearing as tolerated (WBAT)  (Pended)   -MR       User Key  (r) = Recorded By, (t) = Taken By, (c) = Cosigned By    Initials Name Provider Type    Sylvie Garzon, PT Student PT Student        Obj/Interventions     Row Name 11/12/19 1404          General ROM    GENERAL ROM COMMENTS  No IR, Add, or flexion passed 90 degrees of right hip.   (Pended)   -MR     Row Name 11/12/19 1403          Static Sitting Balance    Level of [place] (Unsupported Sitting, Static Balance)  supervision  (Pended)  Pt still drowsy from medication for surgery.   -MR     Sitting Position (Unsupported Sitting, Static Balance)  sitting on edge of bed  (Pended)   -MR     Time Able to Maintain Position (Unsupported Sitting, Static Balance)  1 to 2 minutes  (Pended)   -MR       User Key   (r) = Recorded By, (t) = Taken By, (c) = Cosigned By    Initials Name Provider Type    Sylvie Garzon, PT Student PT Student        Goals/Plan     Row Name 11/12/19 1403          Bed Mobility Goal 1 (PT)    Activity/Assistive Device (Bed Mobility Goal 1, PT)  bed mobility activities, all  (Pended)   -MR     [place] Level/Cues Needed (Bed Mobility Goal 1, PT)  conditional independence  (Pended)   -MR     Time Frame (Bed Mobility Goal 1, PT)  1 week  (Pended)   -MR     Progress/Outcomes (Bed Mobility Goal 1, PT)  goal ongoing  (Pended)   -MR     Row Name 11/12/19 1403          Transfer Goal 1 (PT)    Activity/Assistive Device (Transfer Goal 1, PT)  transfers, all;walker, rolling  (Pended)   -MR     [place] Level/Cues Needed (Transfer Goal 1, PT)  conditional independence  (Pended)   -MR     Time Frame (Transfer Goal 1, PT)  1 week  (Pended)   -MR     Progress/Outcome (Transfer Goal 1, PT)  goal ongoing  (Pended)   -MR     Row Name 11/12/19 1403          Gait Training Goal 1 (PT)    Activity/Assistive Device (Gait Training Goal 1, PT)  assistive device use;walker, rolling  (Pended)   -MR     [place] Level (Gait Training Goal 1, PT)  conditional independence  (Pended)   -MR     Time Frame (Gait Training Goal 1, PT)  1 week  (Pended)   -MR     Progress/Outcome (Gait Training Goal 1, PT)  goal ongoing  (Pended)   -MR       User Key  (r) = Recorded By, (t) = Taken By, (c) = Cosigned By    Initials Name Provider Type    Sylvie Garzon, PT Student PT Student        Clinical Impression     Row Name 11/12/19 1403          Pain Assessment    Additional Documentation  Pain Scale: Numbers Pre/Post-Treatment (Group)  (Pended)   -MR     Row Name 11/12/19 1403          Pain Scale: Numbers Pre/Post-Treatment    Pain Scale: Numbers, Pretreatment  8/10  (Pended)   -MR     Pain Scale: Numbers, Post-Treatment  8/10  (Pended)   -MR     Pain Location - Side  Right  (Pended)   -MR     Pain Location - Orientation   generalized  (Pended)   -MR     Pain Location  hip  (Pended)   -MR     Pain Intervention(s)  Ambulation/increased activity  (Pended)   -MR [place] Name 11/12/19 1403          Plan of Care Review    Plan of Care Reviewed With  patient  (Pended)   -MR [place] Name 11/12/19 1403          Physical Therapy Clinical Impression    Criteria for Skilled Interventions Met (PT Clinical Impression)  yes  (Pended)   -MR     Rehab Potential (PT Clinical Summary)  good, to achieve stated therapy goals  (Pended)   -MR [place] Name 11/12/19 1403          Vital Signs    Pre Patient Position  Supine  (Pended)   -MR     Intra Patient Position  Standing  (Pended)   -MR     Post Patient Position  Sitting  (Pended)   -MR [place] Name 11/12/19 1403          Positioning and Restraints    Pre-Treatment Position  in bed  (Pended)   -MR     Post Treatment Position  chair  (Pended)   -MR     In Chair  encouraged to call for assist;call light within reach;sitting  (Pended)   -MR       User Key  (r) = Recorded By, (t) = Taken By, (c) = Cosigned By    Initials Name Provider Type     Sylvie De La Garza, PT Student PT Student        Outcome Measures     Row Name 11/12/19 1403          How much help from another person do you currently need...    Turning from your back to your side while in flat bed without using bedrails?  3  (Pended)   -MR     Moving from lying on back to sitting on the side of a flat bed without bedrails?  3  (Pended)   -MR     Moving to and from a bed to a chair (including a wheelchair)?  3  (Pended)   -MR     Standing up from a chair using your arms (e.g., wheelchair, bedside chair)?  3  (Pended)   -MR     Climbing 3-5 steps with a railing?  3  (Pended)   -MR     To walk in hospital room?  3  (Pended)   -MR     AM-PAC 6 Clicks Score (PT)  18  (Pended)   -MR [place] Name 11/12/19 1403          Functional Assessment    Outcome Measure Options  AM-PAC 6 Clicks Basic Mobility (PT)  (Pended)   -MR       User Key  (r) = Recorded By, (t)  = Taken By, (c) = Cosigned By    Initials Name Provider Type    Sylvie Garzon, PT Student PT Student        Physical Therapy Education     Title: PT OT SLP Therapies (Done)     Topic: Physical Therapy (Done)     Point: Mobility training (Done)     Learning Progress Summary           Patient Acceptance, E,TB, VU by MR at 11/12/2019  3:29 PM    Comment:  Pt instructed on ROM precautions for posterior hip replacement and gait training with RW.                   Point: Body mechanics (Done)     Learning Progress Summary           Patient Acceptance, E,TB, VU by MR at 11/12/2019  3:29 PM    Comment:  Pt instructed on ROM precautions for posterior hip replacement and gait training with RW.                               User Key     Initials Effective Dates Name Provider Type Discipline    MR 11/06/19 -  Sylvie De La Garza, PT Student PT Student PT              PT Recommendation and Plan  Planned Therapy Interventions (PT Eval): (P) balance training, transfer training, bed mobility training, strengthening, gait training  Outcome Summary/Treatment Plan (PT)  Anticipated Equipment Needs at Discharge (PT): (P) front wheeled walker  Anticipated Discharge Disposition (PT): (P) home with home health  Plan of Care Reviewed With: (P) patient  Outcome Summary: (P) PT eval completed. Pt was informed of precautions for posterior hip replacement.      Time Calculation:   PT Charges     Row Name 11/12/19 1403             Time Calculation    Start Time  1403  (Pended)   -MR      PT Received On  11/12/19  (Pended)   -      PT Goal Re-Cert Due Date  11/22/19  (Pended)   -        User Key  (r) = Recorded By, (t) = Taken By, (c) = Cosigned By    Initials Name Provider Type    Sylvie Garzon, PT Student PT Student        Therapy Charges for Today     Code Description Service Date Service Provider Modifiers Qty    [id number]  PT EVAL LOW COMPLEXITY 3 11/12/2019 Sylvie De La Garza, PT Student GP 1          PT G-Codes  Outcome Measure  Options: (P) AM-PAC 6 Clicks Basic Mobility (PT)  AM-PAC 6 Clicks Score (PT): (P) 18    Sylvie De La Garza PT Student  11/12/2019

## 2019-11-12 NOTE — ANESTHESIA PREPROCEDURE EVALUATION
Anesthesia Evaluation     Patient summary reviewed and Nursing notes reviewed   NPO Solid Status: > 8 hours  NPO Liquid Status: > 8 hours           Airway   Mallampati: II  TM distance: >3 FB  Neck ROM: full  No difficulty expected  Dental      Pulmonary    Cardiovascular   Exercise tolerance: good (4-7 METS)    ECG reviewed    (+) hypertension, hyperlipidemia,       Neuro/Psych  (+) numbness,     GI/Hepatic/Renal/Endo      Musculoskeletal     (+) back pain, chronic pain,   Abdominal    Substance History      OB/GYN          Other   arthritis,                      Anesthesia Plan    ASA 2     general with block     intravenous induction     Anesthetic plan, all risks, benefits, and alternatives have been provided, discussed and informed consent has been obtained with: patient.    Plan discussed with CRNA.

## 2019-11-12 NOTE — ANESTHESIA PROCEDURE NOTES
Peripheral Block      Patient reassessed immediately prior to procedure    Patient location during procedure: post-op  Start time: 11/12/2019 11:19 AM  Stop time: 11/12/2019 11:28 AM  Reason for block: at surgeon's request and post-op pain management  Performed by  CRNA: Shawn Hussein CRNA  Preanesthetic Checklist  Completed: patient identified, site marked, surgical consent, pre-op evaluation, timeout performed, IV checked, risks and benefits discussed and monitors and equipment checked  Prep:  Pt Position: supine  Sterile barriers:cap, gloves, mask and sterile barriers  Prep: ChloraPrep and air dry 3min per clock  Patient monitoring: blood pressure monitoring, continuous pulse oximetry and EKG  Procedure  Sedation:no  Performed under: local infiltration  Guidance:ultrasound guided  ULTRASOUND INTERPRETATION. Using ultrasound guidance a 21 G gauge needle was placed in close proximity to the nerve, at which point, under ultrasound guidance anesthetic was injected in the area of the nerve and spread of the anesthesia was seen on ultrasound in close proximity thereto.  There were no abnormalities seen on ultrasound; a digital image was taken; and the patient tolerated the procedure with no complications. Images:still images obtained, printed/placed on chart    Laterality:right  Block Type:fascia iliaca compartment  Injection Technique:single-shot  Needle Type:echogenic  Needle Gauge:21 G  Resistance on Injection: none          Medications  Comment:Marcaine 0.25% 50 ml with decadron 10 mg    Post Assessment  Injection Assessment: negative aspiration for heme, no paresthesia on injection and incremental injection  Patient Tolerance:comfortable throughout block  Complications:no

## 2019-11-12 NOTE — PLAN OF CARE
Problem: Patient Care Overview  Goal: Plan of Care Review  Outcome: Ongoing (interventions implemented as appropriate)   11/12/19 0552   Coping/Psychosocial   Plan of Care Reviewed With patient   OTHER   Outcome Summary OT evaluation completed today. Pt presents with pain and decreased independence with self care and functional mobility tasks. Pt is expected to benefit from skilled OT to improve his strength and independence with ADL tasks   Plan of Care Review   Progress no change

## 2019-11-12 NOTE — PLAN OF CARE
Problem: Patient Care Overview  Goal: Plan of Care Review  Outcome: Ongoing (interventions implemented as appropriate)  Pt vitals stables. Pt ambulatory and able to walk to the toliet. Pt pain controlled per Mar and working with PT/OT. Pt plan of care reviewed with pt and spouse.

## 2019-11-12 NOTE — PLAN OF CARE
Problem: Patient Care Overview  Goal: Plan of Care Review  Outcome: Ongoing (interventions implemented as appropriate)   11/12/19 3380   Coping/Psychosocial   Plan of Care Reviewed With patient   OTHER   Outcome Summary PT eval completed. Pt was informed of precautions for posterior hip replacement. He ambulated 104' with min A and multiple verbal cues on foot/RW placement. Pt is expected to benefit from continued PT services prior to D/C.

## 2019-11-12 NOTE — INTERVAL H&P NOTE
H&P reviewed. The patient was examined and there are no changes to the H&P, inclusive of the physical exam heart, lungs and procedure site specific exam as noted on the current (within 30 days) history and physical full detailed report.  Vitals:    11/12/19 0634   BP: 151/87   Pulse: 65   Resp: 16   Temp: 97.9 °F (36.6 °C)   SpO2: 97%       Syd Ashley MD  11/12/2019  8:16 AM

## 2019-11-13 PROBLEM — E78.5 DYSLIPIDEMIA: Status: ACTIVE | Noted: 2019-11-13

## 2019-11-13 PROBLEM — G89.29 CHRONIC MIDLINE LOW BACK PAIN: Status: ACTIVE | Noted: 2019-11-13

## 2019-11-13 PROBLEM — M54.50 CHRONIC MIDLINE LOW BACK PAIN: Status: ACTIVE | Noted: 2019-11-13

## 2019-11-13 LAB
ANION GAP SERPL CALCULATED.3IONS-SCNC: 11 MMOL/L (ref 5–15)
BASOPHILS # BLD AUTO: 0.01 10*3/MM3 (ref 0–0.2)
BASOPHILS NFR BLD AUTO: 0.1 % (ref 0–1.5)
BUN BLD-MCNC: 12 MG/DL (ref 6–20)
BUN/CREAT SERPL: 11.2 (ref 7–25)
CALCIUM SPEC-SCNC: 8.9 MG/DL (ref 8.6–10.5)
CHLORIDE SERPL-SCNC: 102 MMOL/L (ref 98–107)
CO2 SERPL-SCNC: 26 MMOL/L (ref 22–29)
CREAT BLD-MCNC: 1.07 MG/DL (ref 0.76–1.27)
DEPRECATED RDW RBC AUTO: 43.9 FL (ref 37–54)
EOSINOPHIL # BLD AUTO: 0 10*3/MM3 (ref 0–0.4)
EOSINOPHIL NFR BLD AUTO: 0 % (ref 0.3–6.2)
ERYTHROCYTE [DISTWIDTH] IN BLOOD BY AUTOMATED COUNT: 13.3 % (ref 12.3–15.4)
GFR SERPL CREATININE-BSD FRML MDRD: 71 ML/MIN/1.73
GLUCOSE BLD-MCNC: 160 MG/DL (ref 65–99)
HCT VFR BLD AUTO: 34.7 % (ref 37.5–51)
HGB BLD-MCNC: 11.4 G/DL (ref 13–17.7)
IMM GRANULOCYTES # BLD AUTO: 0.06 10*3/MM3 (ref 0–0.05)
IMM GRANULOCYTES NFR BLD AUTO: 0.4 % (ref 0–0.5)
LYMPHOCYTES # BLD AUTO: 1.12 10*3/MM3 (ref 0.7–3.1)
LYMPHOCYTES NFR BLD AUTO: 8.2 % (ref 19.6–45.3)
MCH RBC QN AUTO: 29.8 PG (ref 26.6–33)
MCHC RBC AUTO-ENTMCNC: 32.9 G/DL (ref 31.5–35.7)
MCV RBC AUTO: 90.6 FL (ref 79–97)
MONOCYTES # BLD AUTO: 1.56 10*3/MM3 (ref 0.1–0.9)
MONOCYTES NFR BLD AUTO: 11.4 % (ref 5–12)
NEUTROPHILS # BLD AUTO: 10.89 10*3/MM3 (ref 1.7–7)
NEUTROPHILS NFR BLD AUTO: 79.9 % (ref 42.7–76)
NRBC BLD AUTO-RTO: 0 /100 WBC (ref 0–0.2)
PLATELET # BLD AUTO: 266 10*3/MM3 (ref 140–450)
PMV BLD AUTO: 9.7 FL (ref 6–12)
POTASSIUM BLD-SCNC: 4 MMOL/L (ref 3.5–5.2)
RBC # BLD AUTO: 3.83 10*6/MM3 (ref 4.14–5.8)
SODIUM BLD-SCNC: 139 MMOL/L (ref 136–145)
WBC NRBC COR # BLD: 13.64 10*3/MM3 (ref 3.4–10.8)

## 2019-11-13 PROCEDURE — 25010000002 HYDROMORPHONE 1 MG/ML SOLUTION: Performed by: ORTHOPAEDIC SURGERY

## 2019-11-13 PROCEDURE — 99024 POSTOP FOLLOW-UP VISIT: CPT | Performed by: PHYSICIAN ASSISTANT

## 2019-11-13 PROCEDURE — 80048 BASIC METABOLIC PNL TOTAL CA: CPT | Performed by: ORTHOPAEDIC SURGERY

## 2019-11-13 PROCEDURE — 97150 GROUP THERAPEUTIC PROCEDURES: CPT

## 2019-11-13 PROCEDURE — 97110 THERAPEUTIC EXERCISES: CPT

## 2019-11-13 PROCEDURE — 25010000003 CEFAZOLIN SODIUM-DEXTROSE 1-4 GM-%(50ML) RECONSTITUTED SOLUTION: Performed by: ORTHOPAEDIC SURGERY

## 2019-11-13 PROCEDURE — 85025 COMPLETE CBC W/AUTO DIFF WBC: CPT | Performed by: ORTHOPAEDIC SURGERY

## 2019-11-13 PROCEDURE — 97116 GAIT TRAINING THERAPY: CPT

## 2019-11-13 PROCEDURE — 97535 SELF CARE MNGMENT TRAINING: CPT

## 2019-11-13 PROCEDURE — 25010000002 FONDAPARINUX PER 0.5 MG: Performed by: ORTHOPAEDIC SURGERY

## 2019-11-13 PROCEDURE — 99222 1ST HOSP IP/OBS MODERATE 55: CPT | Performed by: INTERNAL MEDICINE

## 2019-11-13 RX ADMIN — OXYCODONE HYDROCHLORIDE 15 MG: 15 TABLET ORAL at 02:20

## 2019-11-13 RX ADMIN — SODIUM CHLORIDE, POTASSIUM CHLORIDE, SODIUM LACTATE AND CALCIUM CHLORIDE 100 ML/HR: 600; 310; 30; 20 INJECTION, SOLUTION INTRAVENOUS at 13:52

## 2019-11-13 RX ADMIN — HYDROMORPHONE HYDROCHLORIDE 1 MG: 1 INJECTION, SOLUTION INTRAMUSCULAR; INTRAVENOUS; SUBCUTANEOUS at 08:08

## 2019-11-13 RX ADMIN — HYDROMORPHONE HYDROCHLORIDE 1 MG: 1 INJECTION, SOLUTION INTRAMUSCULAR; INTRAVENOUS; SUBCUTANEOUS at 13:55

## 2019-11-13 RX ADMIN — CEFAZOLIN SODIUM 1 G: 1 SOLUTION INTRAVENOUS at 01:00

## 2019-11-13 RX ADMIN — GABAPENTIN 200 MG: 100 CAPSULE ORAL at 20:09

## 2019-11-13 RX ADMIN — OXYCODONE HYDROCHLORIDE 15 MG: 15 TABLET ORAL at 19:04

## 2019-11-13 RX ADMIN — HYDROMORPHONE HYDROCHLORIDE 1 MG: 1 INJECTION, SOLUTION INTRAMUSCULAR; INTRAVENOUS; SUBCUTANEOUS at 01:19

## 2019-11-13 RX ADMIN — HYDROMORPHONE HYDROCHLORIDE 1 MG: 1 INJECTION, SOLUTION INTRAMUSCULAR; INTRAVENOUS; SUBCUTANEOUS at 21:08

## 2019-11-13 RX ADMIN — OXYCODONE HYDROCHLORIDE 15 MG: 15 TABLET ORAL at 10:02

## 2019-11-13 RX ADMIN — SODIUM CHLORIDE, PRESERVATIVE FREE 3 ML: 5 INJECTION INTRAVENOUS at 20:09

## 2019-11-13 NOTE — CONSULTS
Patient: Rory Lal  Procedure(s):  total hip arthroplasty, right  Anesthesia type: [unfilled]    Patient location: [facility] Surgical Floor  Last vitals:   Vitals:    11/13/19 0756   BP: 129/75   Pulse: 75   Resp: 16   Temp: 97.9 °F (36.6 °C)   SpO2: 95%     Level of consciousness: awake, alert and oriented    Post-anesthesia pain: adequate analgesia  Airway patency: patent  Respiratory: unassisted  Cardiovascular: stable and blood pressure at baseline  Hydration: euvolemic    Anesthetic complications: no

## 2019-11-13 NOTE — THERAPY TREATMENT NOTE
"Patient Name: Rory Lal  : 1960    MRN: [id number]                              Today's Date: 2019       Admit Date: 2019    Visit Dx:     ICD-10-CM ICD-9-CM   1. Impaired mobility and ADLs Z74.09 799.89   2. Primary osteoarthritis of right hip M16.11 715.15     Patient Active Problem List   Diagnosis   • History of fusion of lumbar spine   • Arthralgia of hip, right   • Primary osteoarthritis of right hip   • Chronic midline low back pain   • Dyslipidemia     Past Medical History:   Diagnosis Date   • Arthritis    • Chronic left-sided low back pain    • Chronic narcotic use    • Chronic right hip pain    • History of chest pain 2019    Patient denies, noted by Dr. Gil on 2015   • History of MRSA infection     Per pt, Left foot; treated with antibiotics and no reoccurence    • History of palpitations 2019    Patient denies, noted by Dr. Gil on 2015   • Hx of colonic polyp    • Hyperlipidemia    • Hypertension     Patient denies, noted by Dr. Gil 2015   • Impaired functional mobility, balance, gait, and endurance    • Impaired gait     R/T right hip pain    • Insomnia    • Lumbosacral disc disease    • Muscle spasm    • Neuropathy    • Non compliance w medication regimen     Patient reported \"I should take cholesterol medicine but I don't.\"    • Prehypertension    • RSD (reflex sympathetic dystrophy) 2015    MD note    • Smokeless tobacco use    • Use of cane as ambulatory aid    • Wears glasses     reading     Past Surgical History:   Procedure Laterality Date   • BACK SURGERY      S1-L5 fusion   • COLONOSCOPY     • DIGIT REATTACHMENT Left     Injury from construction work     • HERNIA REPAIR Right     Inguinal    • TOTAL HIP ARTHROPLASTY Right 2019    Procedure: total hip arthroplasty, right;  Surgeon: Syd Ashley MD;  Location: [facility];  Service: Orthopedics     General Information     Row Name 19 2109 " 11/13/19 0908       PT Evaluation Time/Intention    Document Type  therapy note (daily note)  (Pended)   -MR  therapy note (daily note)  -JR (r) MR (t) JR (c)    Mode of Treatment  physical therapy  (Pended)   -MR  physical therapy  -JR (r) MR (t) JR (c)    Row Name 11/13/19 1339 11/13/19 0908       General Information    Patient Profile Reviewed?  yes  (Pended)   -MR  yes  -JR (r) MR (t) JR (c)    Existing Precautions/Restrictions  hip, posterior;fall  (Pended)   -MR  --    Row Name 11/13/19 1339 11/13/19 0908       Cognitive Assessment/Intervention- PT/OT    Orientation Status (Cognition)  oriented x 4  (Pended)   -MR  oriented x 4  -JR (r) MR (t) JR (c)    Row Name 11/13/19 1339 11/13/19 0908       Safety Issues, Functional Mobility    Safety Issues Affecting Function (Mobility)  impulsivity;insight into deficits/self awareness;sequencing abilities;steps too close to assistive device;safety precautions follow-through/compliance  (Pended)   -MR  impulsivity;steps too close to assistive device;safety precaution awareness;safety precautions follow-through/compliance;insight into deficits/self awareness  -JR (r) MR (t) JR (c)    Impairments Affecting Function (Mobility)  endurance/activity tolerance;pain;strength;balance;coordination  (Pended)   -MR  balance;strength;endurance/activity tolerance;pain  -JR (r) MR (t) JR (c)      User Key  (r) = Recorded By, (t) = Taken By, (c) = Cosigned By    Initials Name Provider Type    Raya Wan, PT Physical Therapist    Sylvie Garzon, PT Student PT Student        Mobility     Row Name 11/13/19 1339 11/13/19 0908       Bed Mobility Assessment/Treatment    Bed Mobility Assessment/Treatment  supine-sit  (Pended)   -MR  supine-sit  -JR (r) MR (t) JR (c)    Supine-Sit [place] (Bed Mobility)  contact guard  (Pended)   -MR  contact guard  -JR (r) MR (t) JR (c)    Assistive Device (Bed Mobility)  bed rails;head of bed elevated  (Pended)   -MR  bed rails;head of bed  elevated  -JR (r) MR (t) JR (c)    Row Name 11/13/19 1339 11/13/19 0908       Sit-Stand Transfer    Sit-Stand [place] (Transfers)  contact guard;verbal cues  (Pended)   -MR  contact guard;verbal cues  -JR (r) MR (t) JR (c)    Assistive Device (Sit-Stand Transfers)  walker, front-wheeled  (Pended)   -MR  walker, front-wheeled  -JR (r) MR (t) JR (c)    Row Name 11/13/19 1339 11/13/19 0908       Gait/Stairs Assessment/Training    Gait/Stairs Assessment/Training  gait/ambulation assistive device  (Pended)   -MR  gait/ambulation assistive device  -JR (r) MR (t) JR (c)    [place] Level (Gait)  verbal cues;contact guard  (Pended)   -MR  verbal cues;contact guard  -JR (r) MR (t) JR (c)    Assistive Device (Gait)  walker, front-wheeled  (Pended)   -MR  walker, front-wheeled  -JR (r) MR (t) JR (c)    Distance in Feet (Gait)  100'  (Pended)   -MR  108'  -JR (r) MR (t) JR (c)    Pattern (Gait)  step-through  (Pended)   -MR  step-through  -JR (r) MR (t) JR (c)    Deviations/Abnormal Patterns (Gait)  festinating/shuffling;princess decreased;gait speed decreased;right sided deviations  (Pended)   -MR  festinating/shuffling;princess decreased;gait speed decreased;right sided deviations  -JR (r) MR (t) JR (c)    Bilateral Gait Deviations  heel strike decreased  (Pended)   -MR  heel strike decreased  -JR (r) MR (t) JR (c)    Right Sided Gait Deviations  weight shift ability decreased;knee hyperextension  (Pended)   -MR  weight shift ability decreased;knee hyperextension  -JR (r) MR (t) JR (c)    Row Name 11/13/19 1339 11/13/19 0908       Mobility Assessment/Intervention    Extremity Weight-bearing Status  right lower extremity  (Pended)   -MR  right lower extremity  -JR (r) MR (t) JR (c)    Right Lower Extremity (Weight-bearing Status)  weight-bearing as tolerated (WBAT)  (Pended)   -MR  weight-bearing as tolerated (WBAT)  -JR (r) MR (t) JR (c)      User Key  (r) = Recorded By, (t) = Taken By, (c) = Cosigned By    Initials  Name Provider Type    Raya Wan, PT Physical Therapist    Sylvie Garzon, PT Student PT Student        Obj/Interventions     Row Name 11/13/19 1339 11/13/19 0908       General ROM    GENERAL ROM COMMENTS  No IR, Add, or flexion passed 90 degrees or right hip per protocol.   (Pended)   -MR  No IR, Add, or flexion passed 90 degrees or right hip per protocol.   -JR (r) MR (t) JR (c)    Row Name 11/13/19 1339 11/13/19 0908       MMT (Manual Muscle Testing)    General MMT Comments  RLE 3/5  (Pended)   -MR  RLE 3/5  -JR (r) MR (t) JR (c)    Row Name 11/13/19 1339 11/13/19 0908       Therapeutic Exercise    Lower Extremity (Therapeutic Exercise)  gluteal sets;heel slides, right;quad sets, right;LAQ (long arc quad), bilateral  (Pended)   -MR  heel slides, right;gluteal sets;quad sets, right  -JR (r) MR (t) JR (c)    Lower Extremity Range of Motion (Therapeutic Exercise)  ankle dorsiflexion/plantar flexion, bilateral  (Pended)   -MR  ankle dorsiflexion/plantar flexion, bilateral  -JR (r) MR (t) JR (c)    Exercise Type (Therapeutic Exercise)  isometric contraction, static;isotonic contraction, concentric  (Pended)   -MR  isometric contraction, static;isotonic contraction, concentric  -JR (r) MR (t) JR (c)    Position (Therapeutic Exercise)  supine  (Pended)   -MR  supine  -JR (r) MR (t) JR (c)    Sets/Reps (Therapeutic Exercise)  3 x a day per HEP  (Pended)   -MR  3 x a day   -JR (r) MR (t) JR (c)    Comment (Therapeutic Exercise)  --  Pt was given a sheet with theraeputic exercises.  -JR (r) MR (t) JR (c)    Row Name 11/13/19 1339 11/13/19 0908       Static Sitting Balance    Level of [place] (Unsupported Sitting, Static Balance)  conditional independence  (Pended)   -MR  conditional independence  -JR (r) MR (t) JR (c)    Sitting Position (Unsupported Sitting, Static Balance)  sitting on edge of bed  (Pended)   -MR  sitting on edge of bed  -JR (r) MR (t) JR (c)    Time Able to Maintain Position (Unsupported  Sitting, Static Balance)  4 to 5 minutes  (Pended)   -MR  1 to 2 minutes  -JR (r) MR (t) JR (c)    Row Name 11/13/19 1339 11/13/19 0908       Static Standing Balance    Level of [place] (Supported Standing, Static Balance)  contact guard assist  (Pended)   -MR  contact guard assist  -JR (r) MR (t) JR (c)    Time Able to Maintain Position (Supported Standing, Static Balance)  2 to 3 minutes  (Pended)   -MR  1 to 2 minutes  -JR (r) MR (t) JR (c)    Assistive Device Utilized (Supported Standing, Static Balance)  walker, rolling  (Pended)   -MR  walker, rolling  -JR (r) MR (t) JR (c)      User Key  (r) = Recorded By, (t) = Taken By, (c) = Cosigned By    Initials Name Provider Type    Raya Wan, PT Physical Therapist    Sylvie Garzon, PT Student PT Student        Goals/Plan    No documentation.       Clinical Impression     Row Name 11/13/19 1339 11/13/19 0908       Pain Assessment    Additional Documentation  Pain Scale: Numbers Pre/Post-Treatment (Group)  (Pended)   -MR  Pain Scale: Numbers Pre/Post-Treatment (Group)  -JR (r) MR (t) JR (c)    Row Name 11/13/19 1339 11/13/19 0908       Pain Scale: Numbers Pre/Post-Treatment    Pain Scale: Numbers, Pretreatment  7/10  (Pended)   -MR  7/10  -JR (r) MR (t) JR (c)    Pain Scale: Numbers, Post-Treatment  8/10  (Pended)   -MR  3/10  -JR (r) MR (t) JR (c)    Pain Location - Side  Right  (Pended)   -MR  Right  -JR (r) MR (t) JR (c)    Pain Location  hip  (Pended)   -MR  hip  -JR (r) MR (t) JR (c)    Pain Intervention(s)  Ambulation/increased activity  (Pended)   -MR  Ambulation/increased activity  -JR (r) MR (t) JR (c)    Row Name 11/13/19 1339 11/13/19 0908       Plan of Care Review    Plan of Care Reviewed With  patient  (Pended)   -MR  patient;spouse  -JR (r) MR (t) JR (c)    Row Name 11/13/19 1339 11/13/19 0908       Physical Therapy Clinical Impression    Criteria for Skilled Interventions Met (PT Clinical Impression)  yes  (Pended)   -MR  yes  - (r) MR (t)  JR (c)    Rehab Potential (PT Clinical Summary)  good, to achieve stated therapy goals  (Pended)   -MR  good, to achieve stated therapy goals  -JR (r) MR (t) JR (c)    Row Name 11/13/19 1339 11/13/19 0908       Vital Signs    Pre SpO2 (%)  96  (Pended)   -MR  --    O2 Delivery Pre Treatment  room air  (Pended)   -MR  --    O2 Delivery Intra Treatment  room air  (Pended)   -MR  --    Post SpO2 (%)  95  (Pended)   -MR  --    O2 Delivery Post Treatment  room air  (Pended)   -MR  --    Pre Patient Position  Supine  (Pended)   -MR  Supine  -JR (r) MR (t) JR (c)    Intra Patient Position  Standing  (Pended)   -MR  Standing  -JR (r) MR (t) JR (c)    Post Patient Position  Supine  (Pended)   -MR  Supine  -JR (r) MR (t) JR (c)    Row Name 11/13/19 1339 11/13/19 0908       Positioning and Restraints    Pre-Treatment Position  in bed  (Pended)   -MR  in bed  -JR (r) MR (t) JR (c)    Post Treatment Position  bed  (Pended)   -MR  bed  -JR (r) MR (t) JR (c)    In Bed  supine;call light within reach;encouraged to call for assist;SCD pump applied  (Pended)   -MR  call light within reach;supine;encouraged to call for assist;with family/caregiver  -JR (r) MR (t) JR (c)      User Key  (r) = Recorded By, (t) = Taken By, (c) = Cosigned By    Initials Name Provider Type    Raya Wan, PT Physical Therapist    Sylvie Garzon, PT Student PT Student        Outcome Measures     Row Name 11/13/19 1339 11/13/19 0908       How much help from another person do you currently need...    Turning from your back to your side while in flat bed without using bedrails?  3  (Pended)   -MR  3  -JR (r) MR (t) JR (c)    Moving from lying on back to sitting on the side of a flat bed without bedrails?  3  (Pended)   -MR  3  -JR (r) MR (t) JR (c)    Moving to and from a bed to a chair (including a wheelchair)?  3  (Pended)   -MR  3  -JR (r) MR (t) JR (c)    Standing up from a chair using your arms (e.g., wheelchair, bedside chair)?  3  (Pended)   -MR  3   -JR (r) MR (t) JR (c)    Climbing 3-5 steps with a railing?  2  (Pended)   -MR  2  -JR (r) MR (t) JR (c)    To walk in hospital room?  3  (Pended)   -MR  3  -JR (r) MR (t) JR (c)    AM-PAC 6 Clicks Score (PT)  17  (Pended)   -MR  17  -JR (r) MR (t)    Row Name 11/13/19 1339 11/13/19 0908       Functional Assessment    Outcome Measure Options  AM-PAC 6 Clicks Basic Mobility (PT)  (Pended)   -MR  AM-PAC 6 Clicks Basic Mobility (PT)  -JR (r) MR (t) JR (c)      User Key  (r) = Recorded By, (t) = Taken By, (c) = Cosigned By    Initials Name Provider Type    Raay Wan, PT Physical Therapist    Sylvie Garzon, PT Student PT Student        Physical Therapy Education     Title: PT OT SLP Therapies (In Progress)     Topic: Physical Therapy (Done)     Point: Mobility training (Done)     Learning Progress Summary           Patient Acceptance, E,TB, VU by MR at 11/13/2019  2:17 PM    Comment:  Pt instructed on ambulation with RW and ther ex listed on flow sheet.    Acceptance, E,TB, VU by MR at 11/13/2019  1:03 PM    Comment:  Pt instructed on ambulation with RW and ther ex listed on flow sheet.    Acceptance, E,TB, VU by MR at 11/12/2019  3:29 PM    Comment:  Pt instructed on ROM precautions for posterior hip replacement and gait training with RW.   Family Acceptance, E,TB, VU by MR at 11/13/2019  1:03 PM    Comment:  Pt instructed on ambulation with RW and ther ex listed on flow sheet.                   Point: Body mechanics (Done)     Learning Progress Summary           Patient Acceptance, E,TB, VU by MR at 11/13/2019  2:17 PM    Comment:  Pt instructed on ambulation with RW and ther ex listed on flow sheet.    Acceptance, E,TB, VU by MR at 11/13/2019  1:03 PM    Comment:  Pt instructed on ambulation with RW and ther ex listed on flow sheet.    Acceptance, E,TB, VU by MR at 11/12/2019  3:29 PM    Comment:  Pt instructed on ROM precautions for posterior hip replacement and gait training with RW.   Family Acceptance,  E,TB, VU by MR at 11/13/2019  1:03 PM    Comment:  Pt instructed on ambulation with RW and ther ex listed on flow sheet.                               User Key     Initials Effective Dates Name Provider Type Discipline    MR 11/06/19 -  Sylvie De La Garza, PT Student PT Student PT              PT Recommendation and Plan  Planned Therapy Interventions (PT Eval): balance training, transfer training, bed mobility training, strengthening, gait training  Outcome Summary/Treatment Plan (PT)  Anticipated Equipment Needs at Discharge (PT): front wheeled walker  Anticipated Discharge Disposition (PT): home with home health  Plan of Care Reviewed With: (P) patient  Progress: (P) improving  Outcome Summary: (P) Pt ambulated 100' with verbal cues on RW and feet placement per posterior hip protocol.      Time Calculation:   PT Charges     Row Name 11/13/19 1339 11/13/19 0908          Time Calculation    Start Time  1339  (Pended)   -MR  0908  -JR (r) MR (t) JR (c)     Stop Time  1405  (Pended)   -MR  0933  -JR (r) MR (t) JR (c)     Time Calculation (min)  26 min  (Pended)   -MR  25 min  -JR (r) MR (t)     PT Received On  11/13/19  (Pended)   -MR  11/13/19  -JR (r) MR (t) JR (c)     PT Goal Re-Cert Due Date  11/22/19  (Pended)   -MR  11/22/19  -JR (r) MR (t) JR (c)       User Key  (r) = Recorded By, (t) = Taken By, (c) = Cosigned By    Initials Name Provider Type    JR Raya Bravo, PT Physical Therapist     FredericSylvie, PT Student PT Student        Therapy Charges for Today     Code Description Service Date Service Provider Modifiers Qty    11989159411 HC PT EVAL LOW COMPLEXITY 3 11/12/2019 Sylvie De La Garza, PT Student GP 1    87147729480 HC PT THER PROC GROUP 11/13/2019 Sylvie De La Garza, PT Student GP 1    53998992408 HC GAIT TRAINING EA 15 MIN 11/13/2019 Sylvie De La Garza, PT Student GP 1    75725073405 HC PT THER PROC GROUP 11/13/2019 Sylvie De La Garza, PT Student GP 1    50634155561 HC GAIT TRAINING EA 15 MIN 11/13/2019 Sylvie De La Garza, PT  Student GP 1          PT G-Codes  Outcome Measure Options: (P) AM-PAC 6 Clicks Basic Mobility (PT)  AM-PAC 6 Clicks Score (PT): (P) 17  AM-PAC 6 Clicks Score (OT): 21    Sylvie De La Garza PT Student  11/13/2019

## 2019-11-13 NOTE — CONSULTS
"Continued Stay Note  [facility]     Patient Name: Rory Lal  MRN: [id number]  Today's Date: 11/13/2019    Admit Date: 11/12/2019    Discharge Plan     Row Name 11/13/19 1502       Plan    Plan Social Service consult due to resource needs      Plan Comments SW met with pt alone in room due to consult. Pt informs SW that he lives at home with his wife and daughter. 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 Pt's main concerns is hospital bills. He states he received from an ED visit at [facility] and is concerned about affording this. SW discussed financial assistance office, pt agreeable to have a representative come speak to him and give him information on financial assistance packet. SW called to Ebony and Shayy, left messages with both to return call to see pt. SW also provided pt with [facility] Resource booklet, food cam, food stamp office, utility assistance and SSI office. Pt states he does have Medicare and receives and SSI check each month. Pt does state he \"is embarrassed\" to sign up for food stamps because he knows someone who works there. SW did provide pt with food stamp information that can be done over the phone, pt was interested in this option and plans to call. All resources left with pt, questions answered appropriately. Provided pt with contact information should he or his wife and questions or concerns. Pt was appreciative of resources and plans to utilize as able. Update FERNANDO Khan of information provided. Awaiting callback from financial counseling to see pt, will continue to follow. CM following for discharge planning.     3:45 PM  Ebony called back and informed SW she will go speak with pt before she leaves today and will provide him with financial assistance packet.                          Discharge Codes    No documentation.       Expected Discharge Date and Time     Expected Discharge Date " Expected Discharge Time    Nov 14, 2019             GUILHERME Merchant   3:10 PM  11/13/19

## 2019-11-13 NOTE — PLAN OF CARE
Problem: Patient Care Overview  Goal: Plan of Care Review  Outcome: Ongoing (interventions implemented as appropriate)   11/13/19 2918   Coping/Psychosocial   Plan of Care Reviewed With patient   OTHER   Outcome Summary Pt ambulated 100' with verbal cues on RW and feet placement per posterior hip protocol. He complained of neuropathy in left foot; to help, he put on shoes and was able to improve his gait pattern. One ther ex was added and listed on flow sheet. Pt to improve with continued PT services prior to D/C.   Plan of Care Review   Progress improving

## 2019-11-13 NOTE — THERAPY TREATMENT NOTE
".Patient Name: Rory Lal  : 1960    MRN: [id number]                              Today's Date: 2019       Admit Date: 2019    Visit Dx:     ICD-10-CM ICD-9-CM   1. Impaired mobility and ADLs Z74.09 799.89   2. Primary osteoarthritis of right hip M16.11 715.15     Patient Active Problem List   Diagnosis   • History of fusion of lumbar spine   • Arthralgia of hip, right   • Primary osteoarthritis of right hip   • Chronic midline low back pain   • Dyslipidemia     Past Medical History:   Diagnosis Date   • Arthritis    • Chronic left-sided low back pain    • Chronic narcotic use    • Chronic right hip pain    • History of chest pain 2019    Patient denies, noted by Dr. Gil on 2015   • History of MRSA infection     Per pt, Left foot; treated with antibiotics and no reoccurence    • History of palpitations 2019    Patient denies, noted by Dr. Gil on 2015   • Hx of colonic polyp    • Hyperlipidemia    • Hypertension     Patient denies, noted by Dr. Gil 2015   • Impaired functional mobility, balance, gait, and endurance    • Impaired gait     R/T right hip pain    • Insomnia    • Lumbosacral disc disease    • Muscle spasm    • Neuropathy    • Non compliance w medication regimen     Patient reported \"I should take cholesterol medicine but I don't.\"    • Prehypertension    • RSD (reflex sympathetic dystrophy) 2015    MD note    • Smokeless tobacco use    • Use of cane as ambulatory aid    • Wears glasses     reading     Past Surgical History:   Procedure Laterality Date   • BACK SURGERY      S1-L5 fusion   • COLONOSCOPY     • DIGIT REATTACHMENT Left     Injury from construction work     • HERNIA REPAIR Right     Inguinal    • TOTAL HIP ARTHROPLASTY Right 2019    Procedure: total hip arthroplasty, right;  Surgeon: Syd Ashley MD;  Location: [facility];  Service: Orthopedics     General Information     Row Name 19 0908       "    PT Evaluation Time/Intention    Document Type  therapy note (daily note)  (Pended)   -MR     Mode of Treatment  physical therapy  (Pended)   -MR     Row Name 11/13/19 0908          General Information    Patient Profile Reviewed?  yes  (Pended)   -     Row Name 11/13/19 0908          Cognitive Assessment/Intervention- PT/OT    Orientation Status (Cognition)  oriented x 4  (Pended)   -     Row Name 11/13/19 0908          Safety Issues, Functional Mobility    Safety Issues Affecting Function (Mobility)  impulsivity;steps too close to assistive device;safety precaution awareness;safety precautions follow-through/compliance;insight into deficits/self awareness  (Pended)   -MR     Impairments Affecting Function (Mobility)  balance;strength;endurance/activity tolerance;pain  (Pended)   -MR       User Key  (r) = Recorded By, (t) = Taken By, (c) = Cosigned By    Initials Name Provider Type    Sylvie Garzon, PT Student PT Student        Mobility     Row Name 11/13/19 0908          Bed Mobility Assessment/Treatment    Bed Mobility Assessment/Treatment  supine-sit  (Pended)   -MR     Supine-Sit [place] (Bed Mobility)  contact guard  (Pended)   -MR     Assistive Device (Bed Mobility)  bed rails;head of bed elevated  (Pended)   -     Row Name 11/13/19 0908          Sit-Stand Transfer    Sit-Stand [place] (Transfers)  contact guard;verbal cues  (Pended)   -MR     Assistive Device (Sit-Stand Transfers)  walker, front-wheeled  (Pended)   -     Row Name 11/13/19 0908          Gait/Stairs Assessment/Training    Gait/Stairs Assessment/Training  gait/ambulation assistive device  (Pended)   -MR     [place] Level (Gait)  verbal cues;contact guard  (Pended)   -MR     Assistive Device (Gait)  walker, front-wheeled  (Pended)   -MR     Distance in Feet (Gait)  108'  (Pended)   -MR     Pattern (Gait)  step-through  (Pended)   -MR     Deviations/Abnormal Patterns (Gait)  festinating/shuffling;princess decreased;gait  speed decreased;right sided deviations  (Pended)   -MR     Bilateral Gait Deviations  heel strike decreased  (Pended)   -MR     Right Sided Gait Deviations  weight shift ability decreased;knee hyperextension  (Pended)   -MR     Row Name 11/13/19 0908          Mobility Assessment/Intervention    Extremity Weight-bearing Status  right lower extremity  (Pended)   -MR     Right Lower Extremity (Weight-bearing Status)  weight-bearing as tolerated (WBAT)  (Pended)   -MR       User Key  (r) = Recorded By, (t) = Taken By, (c) = Cosigned By    Initials Name Provider Type    Sylvie Garzon, PT Student PT Student        Obj/Interventions     Row Name 11/13/19 0908          General ROM    GENERAL ROM COMMENTS  No IR, Add, or flexion passed 90 degrees or right hip per protocol.   (Pended)   -     Row Name 11/13/19 0908          MMT (Manual Muscle Testing)    General MMT Comments  RLE 3/5  (Pended)   -     Row Name 11/13/19 0908          Therapeutic Exercise    Lower Extremity (Therapeutic Exercise)  heel slides, right;gluteal sets;quad sets, right  (Pended)   -MR     Lower Extremity Range of Motion (Therapeutic Exercise)  ankle dorsiflexion/plantar flexion, bilateral  (Pended)   -MR     Exercise Type (Therapeutic Exercise)  isometric contraction, static;isotonic contraction, concentric  (Pended)   -MR     Position (Therapeutic Exercise)  supine  (Pended)   -MR     Sets/Reps (Therapeutic Exercise)  3 x a day   (Pended)   -MR     Comment (Therapeutic Exercise)  Pt was given a sheet with theraeputic exercises.  (Pended)   -     Row Name 11/13/19 0908          Static Sitting Balance    Level of [place] (Unsupported Sitting, Static Balance)  conditional independence  (Pended)   -MR     Sitting Position (Unsupported Sitting, Static Balance)  sitting on edge of bed  (Pended)   -MR     Time Able to Maintain Position (Unsupported Sitting, Static Balance)  1 to 2 minutes  (Pended)   -MR     Row Name 11/13/19 0908           Static Standing Balance    Level of [place] (Supported Standing, Static Balance)  contact guard assist  (Pended)   -MR     Time Able to Maintain Position (Supported Standing, Static Balance)  1 to 2 minutes  (Pended)   -MR     Assistive Device Utilized (Supported Standing, Static Balance)  walker, rolling  (Pended)   -MR       User Key  (r) = Recorded By, (t) = Taken By, (c) = Cosigned By    Initials Name Provider Type    MR Sylvie De La Garza, PT Student PT Student        Goals/Plan    No documentation.       Clinical Impression     Arvin Name 11/13/19 0908          Pain Assessment    Additional Documentation  Pain Scale: Numbers Pre/Post-Treatment (Group)  (Pended)   -MR Sheridan Name 11/13/19 0908          Pain Scale: Numbers Pre/Post-Treatment    Pain Scale: Numbers, Pretreatment  7/10  (Pended)   -MR     Pain Scale: Numbers, Post-Treatment  3/10  (Pended)   -MR     Pain Location - Side  Right  (Pended)   -MR     Pain Location  hip  (Pended)   -MR     Pain Intervention(s)  Ambulation/increased activity  (Pended)   -MR Arvin Lilly 11/13/19 0908          Plan of Care Review    Plan of Care Reviewed With  patient;spouse  (Pended)   -MR Sheridan Name 11/13/19 0908          Physical Therapy Clinical Impression    Criteria for Skilled Interventions Met (PT Clinical Impression)  yes  (Pended)   -MR     Rehab Potential (PT Clinical Summary)  good, to achieve stated therapy goals  (Pended)   -MR [place]idan Name 11/13/19 0908          Vital Signs    Pre Patient Position  Supine  (Pended)   -MR     Intra Patient Position  Standing  (Pended)   -MR     Post Patient Position  Supine  (Pended)   -MR [place]idan Name 11/13/19 0908          Positioning and Restraints    Pre-Treatment Position  in bed  (Pended)   -MR     Post Treatment Position  bed  (Pended)   -MR     In Bed  call light within reach;supine;encouraged to call for assist;with family/caregiver  (Pended)   -MR       User Key  (r) = Recorded By, (t) = Taken By, (c) = Cosigned  By    Initials Name Provider Type    Sylvie Garzon, PT Student PT Student        Outcome Measures     Row Name 11/13/19 0908          How much help from another person do you currently need...    Turning from your back to your side while in flat bed without using bedrails?  3  (Pended)   -MR     Moving from lying on back to sitting on the side of a flat bed without bedrails?  3  (Pended)   -MR     Moving to and from a bed to a chair (including a wheelchair)?  3  (Pended)   -MR     Standing up from a chair using your arms (e.g., wheelchair, bedside chair)?  3  (Pended)   -MR     Climbing 3-5 steps with a railing?  2  (Pended)   -MR     To walk in hospital room?  3  (Pended)   -MR     AM-PAC 6 Clicks Score (PT)  17  (Pended)   -MR     Row Name 11/13/19 0908          Functional Assessment    Outcome Measure Options  AM-PAC 6 Clicks Basic Mobility (PT)  (Pended)   -MR       User Key  (r) = Recorded By, (t) = Taken By, (c) = Cosigned By    Initials Name Provider Type    Sylvie Garzon, PT Student PT Student        Physical Therapy Education     Title: PT OT SLP Therapies (In Progress)     Topic: Physical Therapy (Done)     Point: Mobility training (Done)     Learning Progress Summary           Patient Acceptance, E,TB, VU by MR at 11/13/2019  1:03 PM    Comment:  Pt instructed on ambulation with RW and ther ex listed on flow sheet.    Acceptance, E,TB, VU by MR at 11/12/2019  3:29 PM    Comment:  Pt instructed on ROM precautions for posterior hip replacement and gait training with RW.   Family Acceptance, E,TB, VU by MR at 11/13/2019  1:03 PM    Comment:  Pt instructed on ambulation with RW and ther ex listed on flow sheet.                   Point: Body mechanics (Done)     Learning Progress Summary           Patient Acceptance, E,TB, VU by MR at 11/13/2019  1:03 PM    Comment:  Pt instructed on ambulation with RW and ther ex listed on flow sheet.    Acceptance, E,TB, VU by MR at 11/12/2019  3:29 PM    Comment:  Pt  instructed on ROM precautions for posterior hip replacement and gait training with RW.   Family Acceptance, E,TB, VU by MR at 11/13/2019  1:03 PM    Comment:  Pt instructed on ambulation with RW and ther ex listed on flow sheet.                               User Key     Initials Effective Dates Name Provider Type Discipline    MR 11/06/19 -  Sylvie De La Garza, PT Student PT Student PT              PT Recommendation and Plan  Planned Therapy Interventions (PT Eval): balance training, transfer training, bed mobility training, strengthening, gait training  Outcome Summary/Treatment Plan (PT)  Anticipated Equipment Needs at Discharge (PT): front wheeled walker  Anticipated Discharge Disposition (PT): home with home health  Plan of Care Reviewed With: (P) patient, spouse  Progress: (P) improving  Outcome Summary: (P) Pt was given papers with ther ex and precuations listed, to which he demonstrated and verbalized understanding.      Time Calculation:   PT Charges     Row Name 11/13/19 0908             Time Calculation    Start Time  0908  (Pended)   -MR      Stop Time  0933  (Pended)   -MR      Time Calculation (min)  25 min  (Pended)   -MR      PT Received On  11/13/19  (Pended)   -MR      PT Goal Re-Cert Due Date  11/22/19  (Pended)   -MR        User Key  (r) = Recorded By, (t) = Taken By, (c) = Cosigned By    Initials Name Provider Type    Sylvie Garzon, PT Student PT Student        Therapy Charges for Today     Code Description Service Date Service Provider Modifiers Qty    12126835776 HC PT EVAL LOW COMPLEXITY 3 11/12/2019 Sylvie De La Garza, PT Student GP 1    21536522985 HC PT THER PROC GROUP 11/13/2019 Sylvie De La Garza, PT Student GP 1    60308216769 HC GAIT TRAINING EA 15 MIN 11/13/2019 Sylvie De La Garza, PT Student GP 1          PT G-Codes  Outcome Measure Options: (P) AM-PAC 6 Clicks Basic Mobility (PT)  AM-PAC 6 Clicks Score (PT): (P) 17  AM-PAC 6 Clicks Score (OT): 19    Sylvie De La Garza PT Student  11/13/2019

## 2019-11-13 NOTE — THERAPY TREATMENT NOTE
"Acute Care - Occupational Therapy Treatment Note   [place]     Patient Name: Rory Lal  : 1960  MRN: [id number]  Today's Date: 2019  Onset of Illness/Injury or Date of Surgery: 19  Date of Referral to OT: 19  Referring Physician: Gabi Bowden    Admit Date: 2019       ICD-10-CM ICD-9-CM   1. Impaired mobility and ADLs Z74.09 799.89   2. Primary osteoarthritis of right hip M16.11 715.15     Patient Active Problem List   Diagnosis   • History of fusion of lumbar spine   • Arthralgia of hip, right   • Primary osteoarthritis of right hip   • Chronic midline low back pain   • Dyslipidemia     Past Medical History:   Diagnosis Date   • Arthritis    • Chronic left-sided low back pain    • Chronic narcotic use    • Chronic right hip pain    • History of chest pain 2019    Patient denies, noted by Dr. Gil on 2015   • History of MRSA infection     Per pt, Left foot; treated with antibiotics and no reoccurence    • History of palpitations 2019    Patient denies, noted by Dr. Gil on 2015   • Hx of colonic polyp    • Hyperlipidemia    • Hypertension     Patient denies, noted by Dr. Gil 2015   • Impaired functional mobility, balance, gait, and endurance    • Impaired gait     R/T right hip pain    • Insomnia    • Lumbosacral disc disease    • Muscle spasm    • Neuropathy    • Non compliance w medication regimen     Patient reported \"I should take cholesterol medicine but I don't.\"    • Prehypertension    • RSD (reflex sympathetic dystrophy) 2015    MD note    • Smokeless tobacco use    • Use of cane as ambulatory aid    • Wears glasses     reading     Past Surgical History:   Procedure Laterality Date   • BACK SURGERY      S1-L5 fusion   • COLONOSCOPY     • DIGIT REATTACHMENT Left     Injury from construction work     • HERNIA REPAIR Right     Inguinal    • TOTAL HIP ARTHROPLASTY Right 2019    Procedure: total hip " arthroplasty, right;  Surgeon: Syd Ashley MD;  Location: [facility];  Service: Orthopedics       Therapy Treatment    Rehabilitation Treatment Summary     Row Name 11/13/19 0934             Treatment Time/Intention    Discipline  occupational therapist  -RM      Document Type  therapy note (daily note)  -RM      Subjective Information  complains of;pain  -RM      Mode of Treatment  occupational therapy  -RM      Patient/Family Observations  Pt in bed and wife sitting in bedside chair.  -RM      Care Plan Review  care plan/treatment goals reviewed  -RM      Care Plan Review, Other Participant(s)  spouse  -RM      Total Minutes, Physical Therapy Treatment  --  -RM2      Therapy Frequency (PT Clinical Impression)  daily  -RM2      Total Minutes, Occupational Therapy Treatment  30  -RM2      Therapy Frequency (OT Eval)  daily  -RM2      Patient Effort  excellent  -RM2      Comment  Pt may benefit from BSC with return home for safety with toilet xfers.  -RM2      Existing Precautions/Restrictions  hip, posterior  -RM2      Patient Response to Treatment  Good participation and good demonstration with AE.  -RM2      Recorded by [RM] Sharon Meier, OT 11/13/19 1319  [RM2] Sharon Meier, OT 11/13/19 1344      Row Name 11/13/19 0934             Vital Signs    Pre Patient Position  Supine  -RM      Intra Patient Position  Sitting  -RM      Post Patient Position  Supine  -RM      Recorded by [RM] Sharon Meier OT 11/13/19 7917      Row Name 11/13/19 0934             Cognitive Assessment/Intervention- PT/OT    Orientation Status (Cognition)  oriented x 4  -RM      Recorded by [RM] Sharon Meier OT 11/13/19 1348      Row Name 11/13/19 0934             Safety Issues, Functional Mobility    Safety Issues Affecting Function (Mobility)  other (see comments)  -RM      Impairments Affecting Function (Mobility)  endurance/activity tolerance;pain;strength  -RM      Comment, Safety Issues/Impairments (Mobility)  Following  hip precautions  -RM      Recorded by [RM] Sharon Meier, OT 11/13/19 1344      Row Name 11/13/19 0934             Mobility Assessment/Intervention    Extremity Weight-bearing Status  right lower extremity  -RM      Right Lower Extremity (Weight-bearing Status)  weight-bearing as tolerated (WBAT)  -RM      Recorded by [RM] Sharon Meier, OT 11/13/19 1344      Row Name 11/13/19 09             Bed Mobility Assessment/Treatment    Bed Mobility Assessment/Treatment  supine-sit  -RM      Supine-Sit [place] (Bed Mobility)  contact guard  -RM      Bed Mobility, Safety Issues  other (see comments)  -RM      Assistive Device (Bed Mobility)  bed rails;head of bed elevated  -RM      Comment (Bed Mobility)  Following hip precautions.  Attempts to cross foot over ankle unless verbal cued.  -RM      Recorded by [RM] Sharon Meier OT 11/13/19 1344      Row Name 11/13/19 0934             ADL Assessment/Intervention    02467 - OT Self Care/Mgmt Minutes  20  -RM      Recorded by [RM] Sharon Meier OT 11/13/19 1344      Row Name 11/13/19 0934             Lower Body Dressing Assessment/Training    Lower Body Dressing [place] Level  lower body dressing skills;pants/bottoms;socks;set up;contact guard assist;other (see comments) Use of reacher, sock aide, LH sponge & elastic laces.  -RM      Recorded by [RM] Sharon Meier, OT 11/13/19 1344      Row Name 11/13/19 0934             Therapeutic Exercise    Exercise Type (Therapeutic Exercise)  resistive exercises  -RM      Position (Therapeutic Exercise)  seated  -RM      Sets/Reps (Therapeutic Exercise)  10x1  -RM      Comment (Therapeutic Exercise)  Pt given sheet with exs and red theraband  -RM      Recorded by [RM] Sharon Meier, OT 11/13/19 1344      Row Name 11/13/19 0934             Static Sitting Balance    Level of [place] (Unsupported Sitting, Static Balance)  conditional independence  -RM      Sitting Position (Unsupported Sitting, Static Balance)  sitting  on edge of bed;long sitting on bed  -RM      Time Able to Maintain Position (Unsupported Sitting, Static Balance)  other (see comments) 20 minutes during AE/ADL training  -RM      Recorded by [RM] Sharon Meier, LALA 11/13/19 1344      Row Name 11/13/19 0934             Positioning and Restraints    Pre-Treatment Position  in bed  -RM      Post Treatment Position  bed  -RM      In Bed  SCD pump applied;supine;call light within reach;with family/caregiver;side rails up x2  -RM      Recorded by [RM] Sharon Meier, OT 11/13/19 1344      Row Name 11/13/19 0934             Pain Scale: Numbers Pre/Post-Treatment    Pain Scale: Numbers, Pretreatment  8/10  -RM      Pain Scale: Numbers, Post-Treatment  9/10  -RM      Pain Location - Side  Right  -RM      Pain Location  hip  -RM      Pain Intervention(s)  Cold applied;Ambulation/increased activity  -RM      Recorded by [RM] Sharon Meier, OT 11/13/19 1344      Row Name                Wound 11/12/19 0928 Right hip Incision    Wound - Properties Group Date first assessed: 11/12/19 [PB] Time first assessed: 0928 [PB] Side: Right [PB] Location: hip [PB] Primary Wound Type: Incision [PB] Recorded by:  [PB] Mary Solares RN 11/12/19 0928    Row Name 11/13/19 0934             Plan of Care Review    Plan of Care Reviewed With  patient;spouse  -RM      Recorded by [RM] Sharon Meier OT 11/13/19 1344      Row Name 11/13/19 0934             Outcome Summary/Treatment Plan (OT)    Daily Summary of Progress (OT)  progress toward functional goals is good  -RM      Anticipated Equipment Needs at Discharge (OT)  bedside commode;dressing equipment  -RM      Recorded by [RM] Sharon Meier, OT 11/13/19 1346        User Key  (r) = Recorded By, (t) = Taken By, (c) = Cosigned By    Initials Name Effective Dates Discipline    PB Mary Solares RN 11/07/16 -  Nurse    RM Sharon Meier, OT 11/05/19 -  OT        Wound 11/12/19 0928 Right hip Incision (Active)   Dressing Appearance dry;intact  11/13/2019  8:00 AM   Closure NOEMI 11/13/2019  8:00 AM   Drainage Amount none 11/12/2019  4:00 PM   Dressing Care, Wound gauze;transparent film 11/12/2019  4:00 PM       Occupational Therapy Education     Title: PT OT SLP Therapies (In Progress)     Topic: Occupational Therapy (In Progress)     Point: ADL training (Done)     Description: Instruct learner(s) on proper safety adaptation and remediation techniques during self care or transfers.   Instruct in proper use of assistive devices.    Learning Progress Summary           Patient NAHUM Salcido D,H, VU,RICHARD by  at 11/13/2019  1:56 PM    Comment:  Good comprehension and demonstration of AE with LB ADLs and completion of UE exs per HEP with use of theraband following written program.  Wife present during AE training and UE exs and verbalized understanding.    THOMAS Salcido H, AKRY,RICHARD by  at 11/13/2019  1:53 PM    Comment:  Educated per AE for LB dressing with good comprehension and demo of understanding.  Some verbal cues needing during use of AE.  Good understanding of UE therex per HEP and verbal cues for correct completion of 1 exercise.    Acceptance, E,NAHUM, VU by  at 11/12/2019  4:28 PM    Comment:  Role of OT/POC   Family NAHUM Salcido DH, KARY,RICHARD by  at 11/13/2019  1:56 PM    Comment:  Good comprehension and demonstration of AE with LB ADLs and completion of UE exs per HEP with use of theraband following written program.  Wife present during AE training and UE exs and verbalized understanding.    THOMAS Salcido H, VU,RICHARD by  at 11/13/2019  1:53 PM    Comment:  Educated per AE for LB dressing with good comprehension and demo of understanding.  Some verbal cues needing during use of AE.  Good understanding of UE therex per HEP and verbal cues for correct completion of 1 exercise.                   Point: Home exercise program (Done)     Description: Instruct learner(s) on appropriate technique for monitoring, assisting and/or progressing therapeutic exercises/activities.     Learning Progress Summary           Patient NAHUM Salcido DH, KARY,RICHARD by  at 11/13/2019  1:56 PM    Comment:  Good comprehension and demonstration of AE with LB ADLs and completion of UE exs per HEP with use of theraband following written program.  Wife present during AE training and UE exs and verbalized understanding.    THOMAS Salcido H, VU,DU by  at 11/13/2019  1:53 PM    Comment:  Educated per AE for LB dressing with good comprehension and demo of understanding.  Some verbal cues needing during use of AE.  Good understanding of UE therex per HEP and verbal cues for correct completion of 1 exercise.   Family NAHUM Salcido DH, KARY,RICHARD by  at 11/13/2019  1:56 PM    Comment:  Good comprehension and demonstration of AE with LB ADLs and completion of UE exs per HEP with use of theraband following written program.  Wife present during AE training and UE exs and verbalized understanding.    THOMAS Salcido H, KARY,RICHARD by  at 11/13/2019  1:53 PM    Comment:  Educated per AE for LB dressing with good comprehension and demo of understanding.  Some verbal cues needing during use of AE.  Good understanding of UE therex per HEP and verbal cues for correct completion of 1 exercise.                   Point: Precautions (Done)     Description: Instruct learner(s) on prescribed precautions during self-care and functional transfers.    Learning Progress Summary           Patient NAHUM Salcido D,H, VU,DU by  at 11/13/2019  1:56 PM    Comment:  Good comprehension and demonstration of AE with LB ADLs and completion of UE exs per HEP with use of theraband following written program.  Wife present during AE training and UE exs and verbalized understanding.    THOMAS Salcido H, KARY,DU by  at 11/13/2019  1:53 PM    Comment:  Educated per AE for LB dressing with good comprehension and demo of understanding.  Some verbal cues needing during use of AE.  Good understanding of UE therex per HEP and verbal cues for correct completion of 1 exercise.   Family Omari  THOMAS SIDHUH, KARY,RICHARD by  at 11/13/2019  1:56 PM    Comment:  Good comprehension and demonstration of AE with LB ADLs and completion of UE exs per HEP with use of theraband following written program.  Wife present during AE training and UE exs and verbalized understanding.    THOMAS Salcido H, KARY,RICHARD by  at 11/13/2019  1:53 PM    Comment:  Educated per AE for LB dressing with good comprehension and demo of understanding.  Some verbal cues needing during use of AE.  Good understanding of UE therex per HEP and verbal cues for correct completion of 1 exercise.                               User Key     Initials Effective Dates Name Provider Type Discipline     03/07/18 -  Patricia James Occupational Therapist OT     11/05/19 -  Sharon Meier OT Occupational Therapist OT                OT Recommendation and Plan  Outcome Summary/Treatment Plan (OT)  Daily Summary of Progress (OT): progress toward functional goals is good  Anticipated Equipment Needs at Discharge (OT): bedside commode, dressing equipment  Therapy Frequency (OT Eval): daily  Daily Summary of Progress (OT): progress toward functional goals is good  Plan of Care Review  Plan of Care Reviewed With: patient, spouse  Plan of Care Reviewed With: patient, spouse  Outcome Summary: Pt completing supine to sit side of bed CGA and progressing with LB dressing with use of AE, after demo/education, with Min A. Sit to supine with SBA overall.  Completed UE therex using theraband from sheet with min vcs.  Progressing well and continue OT for increaed Ind with ADLs, fxnal mobility, AE training, and lessening pain.  Outcome Measures     Row Name 11/13/19 0934 11/12/19 7670          How much help from another is currently needed...    Putting on and taking off regular lower body clothing?  3  -RM  2  -AH     Bathing (including washing, rinsing, and drying)  3  -RM  2  -AH     Toileting (which includes using toilet bed pan or urinal)  3  -RM  3  -AH     Putting on and taking off  regular upper body clothing  4  -RM  4  -AH     Taking care of personal grooming (such as brushing teeth)  4  -RM  4  -AH     Eating meals  4  -RM  4  -AH     AM-PAC 6 Clicks Score (OT)  21  -RM  19  -AH        Functional Assessment    Outcome Measure Options  AM-PAC 6 Clicks Daily Activity (OT)  -RM  AM-PAC 6 Clicks Daily Activity (OT)  -AH       User Key  (r) = Recorded By, (t) = Taken By, (c) = Cosigned By    Initials Name Provider Type     Patricia James Occupational Therapist     Sharon Meier OT Occupational Therapist           Time Calculation:   Time Calculation- OT     Row Name 11/13/19 1406 11/13/19 0934          Time Calculation- OT    OT Start Time  0934  -RM  --     OT Stop Time  1004  -RM  --     OT Time Calculation (min)  30 min  -RM  --        Timed Charges    79125 - OT Therapeutic Exercise Minutes  10  -RM  --     09161 - OT Self Care/Mgmt Minutes  20  -RM  20  -RM       User Key  (r) = Recorded By, (t) = Taken By, (c) = Cosigned By    Initials Name Provider Type     Sharon Meier OT Occupational Therapist        Therapy Charges for Today     Code Description Service Date Service Provider Modifiers Qty    12232232533 HC OT SELF CARE/MGMT/TRAIN EA 15 MIN 11/13/2019 Sharon Meier OT GO 1    [id number]  OT THER PROC EA 15 MIN 11/13/2019 Sharon Meier OT GO 1               Sharon Meier OT  11/13/2019

## 2019-11-13 NOTE — PROGRESS NOTES
"      [facility]  PROGRESS NOTE    Name:  Rory Lal   Age:  59 y.o.  Sex:  male  :  1960  MRN:  [id number]   Visit Number:  [id number]  Admission Date:  2019  Date Of Service:  19  Primary Care Physician:  Elvi Tobar APRN     LOS: 1 day :  Patient Care Team:  Elvi Tobar APRN as PCP - General (Family Medicine)  Provider, No Known as Referring Physician:    Chief Complaint:      \" My pain is controlled at moment\"    Subjective / Interval History:     Patient is status post day #1 elective right total hip arthroplasty.  Patient had a uneventful surgery.   He currently denies chest pain, shortness of breath or dizziness.  He states his pain is \"pretty well-controlled\"    Review of Systems:     General ROS: No fever spike, no acute cognitive change.  Ophthalmic ROS: no acute visual disturbance.  ENT ROS: No acute sinus congestion.   Respiratory ROS: No new shortness of breath.   Cardiovascular ROS: No new chest pain.  Gastrointestinal ROS: No acute abdominal change. Non-distended.  Genito-Urinary ROS: No reported dysuria.  Musculoskeletal ROS: No deep calf pain. No acute focal motor deficit  Neurological ROS: No new numbness or tingling.  Dermatological ROS: No erythema.  No cyanosis.  No rash or pruritis.    Vital Signs:    Temp:  [97.9 °F (36.6 °C)-99 °F (37.2 °C)] 98.2 °F (36.8 °C)  Heart Rate:  [75-99] 78  Resp:  [16-18] 16  BP: (111-153)/(73-96) 129/74    Intake and output:    I/O last 3 completed shifts:  In: 4590 [P.O.:240; I.V.:4300; IV Piggyback:50]  Out: 3000 [Urine:2600; Blood:400]  I/O this shift:  In: 120 [P.O.:120]  Out: 725 [Urine:725]    Physical Examination:    General Appearance:    Alert, cooperative, and no acute distress.   Head:    Atraumatic and normocephalic, without obvious abnormality.   Eyes:    Extraocular movements are within normal limits.   ENT:   No acute change.   Neck:   Supple, trachea midline.   Lungs:     Normal respirations, " unlabored.    Heart:    Regular rate.   Abdomen:     Soft, non-distended.   Extremities:   No new motor deficit, no calf pain, Homans sign negative.   Skin:     No rash.  No cyanosis.  No erythema.  No active drainage.    With contact precautions, sterile dressing change done.   Neurologic:   Sensation intact, pulses intact.     Laboratory results:    Lab Results (last 24 hours)     Procedure Component Value Units Date/Time    Basic Metabolic Panel [[id number]]  (Abnormal) Collected:  11/13/19 0516    Specimen:  Blood Updated:  11/13/19 0607     Glucose 160 mg/dL      BUN 12 mg/dL      Creatinine 1.07 mg/dL      Sodium 139 mmol/L      Potassium 4.0 mmol/L      Chloride 102 mmol/L      CO2 26.0 mmol/L      Calcium 8.9 mg/dL      eGFR Non African Amer 71 mL/min/1.73      BUN/Creatinine Ratio 11.2     Anion Gap 11.0 mmol/L     Narrative:       GFR Normal >60  Chronic Kidney Disease <60  Kidney Failure <15    CBC & Differential [[id number]] Collected:  11/13/19 0516    Specimen:  Blood Updated:  11/13/19 0554    Narrative:       The following orders were created for panel order CBC & Differential.  Procedure                               Abnormality         Status                     ---------                               -----------         ------                     CBC Auto Differential[[id number]]        Abnormal            Final result                 Please view results for these tests on the individual orders.    CBC Auto Differential [[id number]]  (Abnormal) Collected:  11/13/19 0516    Specimen:  Blood Updated:  11/13/19 0554     WBC 13.64 10*3/mm3      RBC 3.83 10*6/mm3      Hemoglobin 11.4 g/dL      Hematocrit 34.7 %      MCV 90.6 fL      MCH 29.8 pg      MCHC 32.9 g/dL      RDW 13.3 %      RDW-SD 43.9 fl      MPV 9.7 fL      Platelets 266 10*3/mm3      Neutrophil % 79.9 %      Lymphocyte % 8.2 %      Monocyte % 11.4 %      Eosinophil % 0.0 %      Basophil % 0.1 %      Immature Grans % 0.4 %      Neutrophils,  Absolute 10.89 10*3/mm3      Lymphocytes, Absolute 1.12 10*3/mm3      Monocytes, Absolute 1.56 10*3/mm3      Eosinophils, Absolute 0.00 10*3/mm3      Basophils, Absolute 0.01 10*3/mm3      Immature Grans, Absolute 0.06 10*3/mm3      nRBC 0.0 /100 WBC     Tissue Pathology Exam [[id number]] Collected:  11/12/19 0917    Specimen:  Bone from Hip, Right Updated:  11/12/19 6782          I have reviewed the patient's laboratory results.    Radiology results:    Imaging Results (Last 24 Hours)     ** No results found for the last 24 hours. **          I have reviewed the patient's radiology reports.    AP and lateral taken perioperative shows no acute concern.  Good position and alignment of implants and/or fracture.       Assessment/Plan      Primary osteoarthritis of right hip    History of fusion of lumbar spine    Chronic midline low back pain    Dyslipidemia    S/P right EDMAR:    Continue current management per the detailed orders and instructions, including skin, safety and fall precautions, respiratory therapy with incentive spirometer, advance diet as tolerated, bowel regimen, multi-modal analgesia, DVT prophylaxis, Hospitalist medical management, PT/OT following post-surgical rehab protocol, and Case Management for discharge and rehabilitation plans.    Foreign Shirley PA-C  11/13/19  12:46 PM

## 2019-11-13 NOTE — PLAN OF CARE
Problem: Patient Care Overview  Goal: Plan of Care Review  Outcome: Ongoing (interventions implemented as appropriate)   11/13/19 0658   Coping/Psychosocial   Plan of Care Reviewed With patient;spouse   OTHER   Outcome Summary Pt was given papers with ther ex and precautions listed, to which he demonstrated and verbalized understanding. He shows impulsivity with ambulation using RW and decreased weight shifting to right side. Pt is expected to benefit from continued PT services prior to D/C.     Plan of Care Review   Progress improving

## 2019-11-13 NOTE — PROGRESS NOTES
Discharge Planning Assessment   [place]     Patient Name: Rory Lal  MRN: [id number]  Today's Date: 11/13/2019    Admit Date: 11/12/2019    Discharge Needs Assessment     Row Name 11/13/19 1318       Living Environment    Primary Care Provided by  self    Provides Primary Care For  no one    Family Caregiver if Needed  none    Quality of Family Relationships  supportive    Able to Return to Prior Arrangements  yes    Living Arrangement Comments  Lives with wife in a 2 story house.  Has 4 steps.         Resource/Environmental Concerns    Resource/Environmental Concerns  -- Pt states has had a lot of financial problems lately.  Would like [facility] for financial assistance    Transportation Concerns  car, none       Discharge Needs Assessment    Readmission Within the Last 30 Days  no previous admission in last 30 days    Concerns to be Addressed  discharge planning    Anticipated Changes Related to Illness  none    Equipment Needed After Discharge  walker, rolling;commode    Discharge Facility/Level of Care Needs  home with home health    Offered/Gave Vendor List  yes    Patient's Choice of Community Agency(s)  -- Choses [facility] for DME    Current Discharge Risk  financial support inadequate        Discharge Plan     Row Name 11/13/19 1322       Plan    Plan Spoke to pt in room.  Has no POA or Living Will.  Lives with wife in a 2 story house.  Has 4 steps, has had some difficulty using them in past due to hip pain. Plans to go home at discharge.  Will need HH for PT, a rolling walker and a BSC.  Does not want Inpt rehab.  List of HH and DME's provided.  Chose [facility] for DME.  Has had some financial diffiulty lately and would like resources for assistance.  Maryana FLORES informed and will see pt.  Plans to go home tomorrow and wife will transport.  Cm will continue to follow.          Destination      No service coordination in this encounter.      Durable  Medical Equipment      No service coordination in this encounter.      Dialysis/Infusion      No service coordination in this encounter.      Home Medical Care      No service coordination in this encounter.      Therapy      No service coordination in this encounter.      Community Resources      No service coordination in this encounter.        Expected Discharge Date and Time     Expected Discharge Date Expected Discharge Time    Nov 13, 2019         Demographic Summary     Row Name 11/13/19 1313       General Information    Admission Type  inpatient    Arrived From  PACU/recovery room    Expected Length of Stay (LOS)  2 to 3 days    Referral Source  admission list    Reason for Consult  discharge planning     Used During This Interaction  no        Functional Status     Row Name 11/13/19 1315       Functional Status    Usual Activity Tolerance  good    Current Activity Tolerance  good    Functional Status Comments  Independent       Functional Status, IADL    Medications  independent    Meal Preparation  assistive person    Housekeeping  assistive person    Laundry  assistive person    Shopping  assistive person    IADL Comments  Sefl       Mental Status    General Appearance WDL  WDL       Mental Status Summary    Recent Changes in Mental Status/Cognitive Functioning  no changes    Mental Status Comments  alert and orient        Psychosocial    No documentation.       Abuse/Neglect    No documentation.       Legal    No documentation.       Substance Abuse    No documentation.       Patient Forms    No documentation.           Erin Davidson RN

## 2019-11-13 NOTE — PLAN OF CARE
Problem: Fall Risk (Adult)  Goal: Identify Related Risk Factors and Signs and Symptoms  Outcome: Ongoing (interventions implemented as appropriate)   11/12/19 2218   Fall Risk (Adult)   Related Risk Factors (Fall Risk) gait/mobility problems;environment unfamiliar   Signs and Symptoms (Fall Risk) presence of risk factors     Goal: Absence of Fall  Outcome: Ongoing (interventions implemented as appropriate)   11/12/19 2218   Fall Risk (Adult)   Absence of Fall making progress toward outcome       Problem: Hip Arthroplasty (Total, Partial) (Adult)  Goal: Signs and Symptoms of Listed Potential Problems Will be Absent, Minimized or Managed (Hip Arthroplasty)   11/12/19 2218   Goal/Outcome Evaluation   Problems Assessed (Hip Arthroplasty) pain   Problems Present (Hip Arthroplasty) pain     Goal: Anesthesia/Sedation Recovery  Outcome: Ongoing (interventions implemented as appropriate)   11/12/19 2218   Goal/Outcome Evaluation   Anesthesia/Sedation Recovery recovered to baseline

## 2019-11-13 NOTE — CONSULTS
[facility]      Name:  Rory Lal   Age:  59 y.o.  Sex:  male  :  1960  MRN:  [id number]   Visit Number:  [id number]  Admission Date:  2019  Date Of Service:  19  Primary Care Physician:  Elvi Tobar APRN    Consulting Physician:    Nam Astudillo MD    Referring Physician:    Syd Ashley MD    Reason For Consult:    Medical management.    Chief Complaint:     Right hip pain.    History Of Presenting Illness:      This is a 59-year-old male with history of right hip joint osteoarthritis, chronic back pain and dyslipidemia was admitted for elective right hip arthroplasty by Dr. Ashley yesterday.  I am seeing the patient postoperatively for medical management.  He is currently lying down on the bed and is comfortable at rest.  He does have right hip pain which is improved with analgesic treatment.  He denies any chest pain or shortness of breath.  Denies any prior history of cardiac or lung diseases.  He does have chronic back pain and has had surgery for back.    Patient has tolerated the procedure well.  Blood work is fairly stable today except for WBC of 13.6.  His home medications have been continued and he is on Arixtra for DVT prophylaxis.  Patient states that he had a fall several months ago causing trauma to the right hip and subsequent osteoarthritis requiring hip replacement.    Review Of Systems:     General ROS: Patient denies any fevers, chills or loss of consciousness.  Psychological ROS: Denies any hallucinations and delusions.  Ophthalmic ROS: Denies any diplopia or transient loss of vision.  ENT ROS: Denies sore throat, nasal congestion or ear pain.   Allergy and Immunology ROS: Denies rash or itching.  Hematological and Lymphatic ROS: Denies neck swelling or easy bleeding.  Endocrine ROS: Denies any recent unintentional weight gain or loss.  Respiratory ROS: Denies cough or shortness of breath.  Cardiovascular ROS:  "Denies chest pain or palpitations. No history of exertional chest pain.  Gastrointestinal ROS: Denies nausea and vomiting. Denies any abdominal pain. No diarrhea.  Genito-Urinary ROS: Denies dysuria or hematuria.  Musculoskeletal ROS: Complains of chronic back pain. Complains of right hip pain.  Neurological ROS: Denies any focal weakness. No loss of consciousness. Denies any numbness.  Dermatological ROS: Denies any redness or pruritis.     Past Medical History:    Past Medical History:   Diagnosis Date   • Arthritis    • Chronic left-sided low back pain    • Chronic narcotic use    • Chronic right hip pain    • History of chest pain 11/07/2019    Patient denies, noted by Dr. Gil on 2/5/2015   • History of MRSA infection 2015    Per pt, Left foot; treated with antibiotics and no reoccurence    • History of palpitations 11/07/2019    Patient denies, noted by Dr. Gil on 2/5/2015   • Hx of colonic polyp    • Hyperlipidemia    • Hypertension     Patient denies, noted by Dr. Gil 2/5/2015   • Impaired functional mobility, balance, gait, and endurance    • Impaired gait     R/T right hip pain    • Insomnia    • Lumbosacral disc disease    • Muscle spasm    • Neuropathy    • Non compliance w medication regimen     Patient reported \"I should take cholesterol medicine but I don't.\"    • Prehypertension    • RSD (reflex sympathetic dystrophy) 02/05/2015    MD note    • Smokeless tobacco use    • Use of cane as ambulatory aid    • Wears glasses     reading     Past Surgical history:    Past Surgical History:   Procedure Laterality Date   • BACK SURGERY  2004    S1-L5 fusion   • COLONOSCOPY     • 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     • HERNIA REPAIR Right 1995    Inguinal      Social History:    Social History     Socioeconomic History   • Marital status:      Spouse name: Not on file   • Number of children: Not on file   • Years of education: Not on file   • Highest education level: Not " on file   Occupational History     Employer: DISABLED   Tobacco Use   • Smoking status: Former Smoker     Types: Cigarettes   • Smokeless tobacco: Current User     Types: Snuff   • Tobacco comment: Patient reports only used briefly   Substance and Sexual Activity   • Alcohol use: No     Frequency: Never   • Drug use: No   • Sexual activity: Defer     Family History:    History reviewed. No pertinent family history.    Allergies:      Pollen extract    Home Medications:    Prior to Admission Medications     Prescriptions Last Dose Informant Patient Reported? Taking?    acetaminophen (TYLENOL) 325 MG tablet Past Week Self Yes Yes    Take 650 mg by mouth Every 6 (Six) Hours As Needed.    atorvastatin (LIPITOR) 10 MG tablet Past Month  Yes Yes    Take 10 mg by mouth Every Night.    gabapentin (NEURONTIN) 100 MG capsule Past Week  Yes Yes    Take 200 mg by mouth At Night As Needed.    ibuprofen (ADVIL,MOTRIN) 800 MG tablet 11/11/2019 Self Yes Yes    Take 800 mg by mouth 3 (Three) Times a Day.    oxyCODONE (ROXICODONE) 15 MG immediate release tablet 11/11/2019 Self Yes Yes    Take 15 mg by mouth Every 6 (Six) Hours As Needed for Moderate Pain .    tiZANidine (ZANAFLEX) 4 MG tablet Past Month  No Yes    Take 1 tablet by mouth At Night As Needed for Muscle Spasms.           Hospital Scheduled Meds:      atorvastatin 10 mg Oral Daily   fondaparinux 2.5 mg Subcutaneous Q24H   gabapentin 200 mg Oral Nightly   sodium chloride 3 mL Intravenous Q12H       lactated ringers 100 mL/hr Last Rate: 100 mL/hr (11/12/19 1142)      Vital Signs:    Temp:  [97.9 °F (36.6 °C)-99 °F (37.2 °C)] 97.9 °F (36.6 °C)  Heart Rate:  [] 75  Resp:  [11-20] 16  BP: (111-153)/() 129/75        11/12/19  1555 11/13/19  0500   Weight: 85.3 kg (188 lb 0.8 oz) 87.2 kg (192 lb 3.2 oz)     Body mass index is 29.64 kg/m².    Physical Exam:    General Appearance:  Alert and cooperative, not in any acute distress.   Head:  Atraumatic and normocephalic,  without obvious abnormality.   Eyes:          PERRLA, conjunctivae and sclerae normal, no Icterus. No pallor. Extraocular movements are within normal limits.   Ears:  Ears appear intact with no abnormalities noted.   Throat: No oral lesions, no thrush, oral mucosa moist.   Neck: Supple, trachea midline, no thyromegaly, no carotid bruit.   Back:   No kyphoscoliosis present. No tenderness to palpation,   range of motion normal.   Lungs:   Chest shape is normal. Breath sounds heard bilaterally equally.  No crackles or wheezing. No Pleural rub or bronchial breathing.   Heart:  Normal S1 and S2, no murmur, no gallop, no rub. No JVD.   Abdomen:   Normal bowel sounds, no masses, no organomegaly. Soft, non-tender, non-distended, no guarding, no rebound tenderness.   Extremities: Moves all extremities well, no edema, no cyanosis, no clubbing. Surgical bandage noted on the right hip area.   Pulses: Pulses palpable and equal bilaterally.   Skin: No bleeding, bruising or rash.   Neurologic: Alert and oriented x 3. Moves all four limbs equally. No tremors. No facial asymmetry.     Laboratory data:    Results from last 7 days   Lab Units 11/13/19  0516 11/07/19  1116   SODIUM mmol/L 139 140   POTASSIUM mmol/L 4.0 4.6   CHLORIDE mmol/L 102 103   CO2 mmol/L 26.0 25.0   BUN mg/dL 12 18   CREATININE mg/dL 1.07 1.01   CALCIUM mg/dL 8.9 9.6   BILIRUBIN mg/dL  --  0.7   ALK PHOS U/L  --  77   ALT (SGPT) U/L  --  26   AST (SGOT) U/L  --  43*   GLUCOSE mg/dL 160* 109*     Results from last 7 days   Lab Units 11/13/19  0516 11/07/19  1116   WBC 10*3/mm3 13.64* 4.75   HEMOGLOBIN g/dL 11.4* 13.3   HEMATOCRIT % 34.7* 41.0   PLATELETS 10*3/mm3 266 254     Results from last 7 days   Lab Units 11/07/19  1116   INR  0.96       Results from last 7 days   Lab Units 11/07/19  1116   COLOR UA  Yellow   GLUCOSE UA  Negative   KETONES UA  40 mg/dL (2+)*   LEUKOCYTES UA  Negative   PH, URINE  <=5.0   BILIRUBIN UA  Negative   UROBILINOGEN UA  0.2 E.U./dL      Results from last 7 days   Lab Units 11/07/19  1116   MRSA SCREEN CX  No Methicillin Resistant Staphylococcus aureus isolated     Radiology:    Imaging Results (Last 72 Hours)     Procedure Component Value Units Date/Time    XR Pelvis 1 or 2 View [[id number]] Collected:  11/12/19 1139     Updated:  11/12/19 1142    Narrative:       PROCEDURE: XR PELVIS 1 OR 2 VW-     HISTORY: Post-op R THR; M16.11-Unilateral primary osteoarthritis, right  hip     COMPARISON: 09/04/2019.     FINDINGS: The patient is status post total right hip arthroplasty. There  are no hardware complications. The pubic symphysis and SI joints are  intact. The expected postoperative gas is seen in the lateral soft  tissues.       Impression:       Status post total right hip arthroplasty with no hardware  complications.     This report was finalized on 11/12/2019 11:40 AM by Laura Ramirez M.D..        Assessment:     1.  Right hip advanced osteoarthritis status post EDMAR on 11/12/2019.  2.  Chronic back pain.  3.  Dyslipidemia.    Recommendations/Plan:     Mr. Lal is currently doing fairly well and is hemodynamically stable.  I have encouraged him to use the incentive spirometry.  His home medications have been continued without any change.  He will be started on physical and occupational therapy.  He is on Arixtra for DVT prophylaxis.  Thank you for allowing me to participate in the care of this patient.  Mild leukocytosis is probably related to surgery and atelectasis.  We will repeat CBC in the morning.  He may be discharged home with no change in his home medication regimen when deemed appropriate by orthopedic services.    Nam Asutdillo MD  11/13/19  10:37 AM    Dictated utilizing Dragon dictation.

## 2019-11-13 NOTE — PLAN OF CARE
Problem: Patient Care Overview  Goal: Plan of Care Review  Outcome: Ongoing (interventions implemented as appropriate)   11/13/19 4931   Coping/Psychosocial   Plan of Care Reviewed With patient   OTHER   Outcome Summary Pt worked with therapy today. VSS. C/O pain- see MAR. Will continue to monitor.   Plan of Care Review   Progress improving

## 2019-11-14 VITALS
BODY MASS INDEX: 29.31 KG/M2 | DIASTOLIC BLOOD PRESSURE: 79 MMHG | RESPIRATION RATE: 18 BRPM | OXYGEN SATURATION: 98 % | WEIGHT: 193.4 LBS | HEIGHT: 68 IN | HEART RATE: 83 BPM | TEMPERATURE: 98 F | SYSTOLIC BLOOD PRESSURE: 134 MMHG

## 2019-11-14 LAB
ANION GAP SERPL CALCULATED.3IONS-SCNC: 7.9 MMOL/L (ref 5–15)
BUN BLD-MCNC: 15 MG/DL (ref 6–20)
BUN/CREAT SERPL: 16.1 (ref 7–25)
CALCIUM SPEC-SCNC: 8 MG/DL (ref 8.6–10.5)
CHLORIDE SERPL-SCNC: 107 MMOL/L (ref 98–107)
CO2 SERPL-SCNC: 26.1 MMOL/L (ref 22–29)
CREAT BLD-MCNC: 0.93 MG/DL (ref 0.76–1.27)
DEPRECATED RDW RBC AUTO: 46.9 FL (ref 37–54)
ERYTHROCYTE [DISTWIDTH] IN BLOOD BY AUTOMATED COUNT: 13.7 % (ref 12.3–15.4)
GFR SERPL CREATININE-BSD FRML MDRD: 83 ML/MIN/1.73
GLUCOSE BLD-MCNC: 124 MG/DL (ref 65–99)
HCT VFR BLD AUTO: 31.3 % (ref 37.5–51)
HGB BLD-MCNC: 10.1 G/DL (ref 13–17.7)
MCH RBC QN AUTO: 30 PG (ref 26.6–33)
MCHC RBC AUTO-ENTMCNC: 32.3 G/DL (ref 31.5–35.7)
MCV RBC AUTO: 92.9 FL (ref 79–97)
PLATELET # BLD AUTO: 186 10*3/MM3 (ref 140–450)
PMV BLD AUTO: 9.3 FL (ref 6–12)
POTASSIUM BLD-SCNC: 3.7 MMOL/L (ref 3.5–5.2)
RBC # BLD AUTO: 3.37 10*6/MM3 (ref 4.14–5.8)
SODIUM BLD-SCNC: 141 MMOL/L (ref 136–145)
WBC NRBC COR # BLD: 7.32 10*3/MM3 (ref 3.4–10.8)

## 2019-11-14 PROCEDURE — 85027 COMPLETE CBC AUTOMATED: CPT | Performed by: INTERNAL MEDICINE

## 2019-11-14 PROCEDURE — 80048 BASIC METABOLIC PNL TOTAL CA: CPT | Performed by: INTERNAL MEDICINE

## 2019-11-14 PROCEDURE — 25010000002 FONDAPARINUX PER 0.5 MG: Performed by: ORTHOPAEDIC SURGERY

## 2019-11-14 PROCEDURE — 97110 THERAPEUTIC EXERCISES: CPT

## 2019-11-14 PROCEDURE — 97116 GAIT TRAINING THERAPY: CPT

## 2019-11-14 PROCEDURE — 97150 GROUP THERAPEUTIC PROCEDURES: CPT

## 2019-11-14 PROCEDURE — 99024 POSTOP FOLLOW-UP VISIT: CPT | Performed by: PHYSICIAN ASSISTANT

## 2019-11-14 RX ORDER — OXYCODONE AND ACETAMINOPHEN 7.5; 325 MG/1; MG/1
1 TABLET ORAL NIGHTLY PRN
Qty: 7 TABLET | Refills: 0 | Status: SHIPPED | OUTPATIENT
Start: 2019-11-14 | End: 2020-05-26

## 2019-11-14 RX ORDER — OXYCODONE AND ACETAMINOPHEN 7.5; 325 MG/1; MG/1
1 TABLET ORAL NIGHTLY PRN
Qty: 7 TABLET | Refills: 0 | Status: CANCELLED | OUTPATIENT
Start: 2019-11-14

## 2019-11-14 RX ADMIN — ACETAMINOPHEN 650 MG: 325 TABLET, FILM COATED ORAL at 08:43

## 2019-11-14 RX ADMIN — ATORVASTATIN CALCIUM 10 MG: 10 TABLET, FILM COATED ORAL at 08:34

## 2019-11-14 RX ADMIN — OXYCODONE HYDROCHLORIDE 15 MG: 15 TABLET ORAL at 06:23

## 2019-11-14 RX ADMIN — FONDAPARINUX SODIUM 2.5 MG: 2.5 INJECTION, SOLUTION SUBCUTANEOUS at 08:34

## 2019-11-14 RX ADMIN — OXYCODONE HYDROCHLORIDE 15 MG: 15 TABLET ORAL at 12:43

## 2019-11-14 RX ADMIN — SODIUM CHLORIDE, PRESERVATIVE FREE 3 ML: 5 INJECTION INTRAVENOUS at 08:35

## 2019-11-14 NOTE — PLAN OF CARE
Problem: Patient Care Overview  Goal: Plan of Care Review  Outcome: Ongoing (interventions implemented as appropriate)   11/14/19 1201   Coping/Psychosocial   Plan of Care Reviewed With patient   OTHER   Outcome Summary Pt continues with high pain report. Pt able to perform UB strengthening ex 3 x10 reps as per flow sheet. Pt was educated on the importance of using adaptive equipment for bathing/dressing. Wife reports she will assist him and is planning to look on [employer] for equipment.   Plan of Care Review   Progress improving

## 2019-11-14 NOTE — PROGRESS NOTES
Continued Stay Note   [place]     Patient Name: Rory Lal  MRN: [id number]  Today's Date: 11/14/2019    Admit Date: 11/12/2019    Discharge Plan     Row Name 11/14/19 1208       Plan    Plan  Received order for HH, a BSC and walker.  Pt chose [facility] for DME yesterday.  Called to [facility] and [facility].  Cm will continue to follow.          Discharge Codes    No documentation.       Expected Discharge Date and Time     Expected Discharge Date Expected Discharge Time    Nov 14, 2019             Erin Davidson RN

## 2019-11-14 NOTE — PLAN OF CARE
Problem: Patient Care Overview  Goal: Plan of Care Review  Outcome: Ongoing (interventions implemented as appropriate)   11/14/19 1251   Coping/Psychosocial   Plan of Care Reviewed With patient;spouse   OTHER   Outcome Summary Pt reports more pain today. Pt's foot placement has improved with gait training and he is independent with performing his ther ex per flow sheet.    Plan of Care Review   Progress improving

## 2019-11-14 NOTE — PLAN OF CARE
Problem: Patient Care Overview  Goal: Plan of Care Review  Outcome: Ongoing (interventions implemented as appropriate)   11/14/19 0320   Coping/Psychosocial   Plan of Care Reviewed With patient   OTHER   Outcome Summary no acute events throughout the night. pt c/o pain- see MAR. encouraged incentive spirometer. dressing CDI. VSS, will continue to monitor   Plan of Care Review   Progress improving       Problem: Fall Risk (Adult)  Goal: Identify Related Risk Factors and Signs and Symptoms  Outcome: Ongoing (interventions implemented as appropriate)    Goal: Absence of Fall  Outcome: Ongoing (interventions implemented as appropriate)

## 2019-11-14 NOTE — DISCHARGE SUMMARY
Name:  Rory Lal   Age:  59 y.o.  Sex:  male  :  1960  MRN:  [id number]   Visit Number:  [id number]  Primary Care Physician:  Elvi Tobar APRN  Date of Discharge:  2019  Admission Date:  2019  6:16 AM      Discharge Diagnosis:       Patient Active Problem List   Diagnosis   • History of fusion of lumbar spine   • Arthralgia of hip, right   • Primary osteoarthritis of right hip   • Chronic midline low back pain   • Dyslipidemia       Presenting Problem/History of Present Illness:    Primary osteoarthritis of right hip [M16.11]  Primary osteoarthritis of right hip [M16.11]       Consults:     Consults     Date and Time Order Name Status Description    2019 1134 Inpatient Consult to Hospitalist Completed           Procedures Performed:    Procedure(s):  total hip arthroplasty, right         History of presenting illness:    Patient is postop day #2 elective right total hip arthroplasty secondary to osteoarthritis pain.    Patient tolerated the surgical procedure well.  He has had an uneventful hospital stay thus far.  He currently states his pain is well controlled.  He denies chest pain, shortness of breath or abdominal pain    Vital Signs:    Temp:  [97.9 °F (36.6 °C)-99.1 °F (37.3 °C)] 98 °F (36.7 °C)  Heart Rate:  [62-89] 83  Resp:  [16-18] 18  BP: (105-135)/(71-80) 134/79    Physical Exam:    General Appearance alert, appears stated age and cooperative  Head normocephalic, without obvious abnormality and atraumatic  Eyes lids and lashes normal  Nose no drainage  Throat oral mucosa moist  Lungs respirations regular, respirations even and respirations unlabored  Heart regular rhythm & normal rate  Abdomen soft non-tender  Extremities no redness and Jessica's sign negative  Pulses Pulses palpable and equal bilaterally  Skin turgor normal  Neurologic Mental Status orientated to person, place, time and situation    The right hip incision is clean and dry.  Pertinent Lab  Results:     Lab Results (all)     Procedure Component Value Units Date/Time    Basic Metabolic Panel [[id number]]  (Abnormal) Collected:  11/14/19 0625    Specimen:  Blood Updated:  11/14/19 0719     Glucose 124 mg/dL      BUN 15 mg/dL      Creatinine 0.93 mg/dL      Sodium 141 mmol/L      Potassium 3.7 mmol/L      Chloride 107 mmol/L      CO2 26.1 mmol/L      Calcium 8.0 mg/dL      eGFR Non African Amer 83 mL/min/1.73      BUN/Creatinine Ratio 16.1     Anion Gap 7.9 mmol/L     Narrative:       GFR Normal >60  Chronic Kidney Disease <60  Kidney Failure <15    CBC (No Diff) [[id number]]  (Abnormal) Collected:  11/14/19 0625    Specimen:  Blood Updated:  11/14/19 0704     WBC 7.32 10*3/mm3      RBC 3.37 10*6/mm3      Hemoglobin 10.1 g/dL      Hematocrit 31.3 %      MCV 92.9 fL      MCH 30.0 pg      MCHC 32.3 g/dL      RDW 13.7 %      RDW-SD 46.9 fl      MPV 9.3 fL      Platelets 186 10*3/mm3     Basic Metabolic Panel [[id number]]  (Abnormal) Collected:  11/13/19 0516    Specimen:  Blood Updated:  11/13/19 0607     Glucose 160 mg/dL      BUN 12 mg/dL      Creatinine 1.07 mg/dL      Sodium 139 mmol/L      Potassium 4.0 mmol/L      Chloride 102 mmol/L      CO2 26.0 mmol/L      Calcium 8.9 mg/dL      eGFR Non African Amer 71 mL/min/1.73      BUN/Creatinine Ratio 11.2     Anion Gap 11.0 mmol/L     Narrative:       GFR Normal >60  Chronic Kidney Disease <60  Kidney Failure <15    CBC & Differential [[id number]] Collected:  11/13/19 0516    Specimen:  Blood Updated:  11/13/19 0554    Narrative:       The following orders were created for panel order CBC & Differential.  Procedure                               Abnormality         Status                     ---------                               -----------         ------                     CBC Auto Differential[[id number]]        Abnormal            Final result                 Please view results for these tests on the individual orders.    CBC Auto Differential [[id number]]   (Abnormal) Collected:  11/13/19 0516    Specimen:  Blood Updated:  11/13/19 0554     WBC 13.64 10*3/mm3      RBC 3.83 10*6/mm3      Hemoglobin 11.4 g/dL      Hematocrit 34.7 %      MCV 90.6 fL      MCH 29.8 pg      MCHC 32.9 g/dL      RDW 13.3 %      RDW-SD 43.9 fl      MPV 9.7 fL      Platelets 266 10*3/mm3      Neutrophil % 79.9 %      Lymphocyte % 8.2 %      Monocyte % 11.4 %      Eosinophil % 0.0 %      Basophil % 0.1 %      Immature Grans % 0.4 %      Neutrophils, Absolute 10.89 10*3/mm3      Lymphocytes, Absolute 1.12 10*3/mm3      Monocytes, Absolute 1.56 10*3/mm3      Eosinophils, Absolute 0.00 10*3/mm3      Basophils, Absolute 0.01 10*3/mm3      Immature Grans, Absolute 0.06 10*3/mm3      nRBC 0.0 /100 WBC     Tissue Pathology Exam [[id number]] Collected:  11/12/19 0917    Specimen:  Bone from Hip, Right Updated:  11/12/19 1414          Pertinent Radiology Results:    Imaging Results (All)     Procedure Component Value Units Date/Time    XR Pelvis 1 or 2 View [[id number]] Collected:  11/12/19 1139     Updated:  11/12/19 1142    Narrative:       PROCEDURE: XR PELVIS 1 OR 2 VW-     HISTORY: Post-op R THR; M16.11-Unilateral primary osteoarthritis, right  hip     COMPARISON: 09/04/2019.     FINDINGS: The patient is status post total right hip arthroplasty. There  are no hardware complications. The pubic symphysis and SI joints are  intact. The expected postoperative gas is seen in the lateral soft  tissues.       Impression:       Status post total right hip arthroplasty with no hardware  complications.     This report was finalized on 11/12/2019 11:40 AM by Laura Ramirez M.D..          Condition on Discharge:      stable      Discharge Disposition:    Home-Health Care [facility]    Discharge Medication:       Discharge Medications      New Medications      Instructions Start Date   enoxaparin 40 MG/0.4ML solution syringe  Commonly known as:  LOVENOX   40 mg, Subcutaneous, Every 24 Hours Scheduled       oxyCODONE-acetaminophen 7.5-325 MG per tablet  Commonly known as:  PERCOCET   1 tablet, Oral, Nightly PRN         Continue These Medications      Instructions Start Date   atorvastatin 10 MG tablet  Commonly known as:  LIPITOR   10 mg, Oral, Nightly      gabapentin 100 MG capsule  Commonly known as:  NEURONTIN   200 mg, Oral, Nightly PRN      oxyCODONE 15 MG immediate release tablet  Commonly known as:  ROXICODONE   15 mg, Oral, Every 6 Hours PRN      tiZANidine 4 MG tablet  Commonly known as:  ZANAFLEX   4 mg, Oral, Nightly PRN      TYLENOL 325 MG tablet  Generic drug:  acetaminophen   650 mg, Oral, Every 6 Hours PRN         Stop These Medications    ibuprofen 800 MG tablet  Commonly known as:  ADVIL,MOTRIN            Discharge Diet:     Diet Instructions     Advance Diet as Tolerated            Activity at Discharge:     Activity Instructions     Change Dressing      Change dressing once daily starting second post-operative day, and keep a clean dry dressing in place.    Patient May Shower; No Tub Baths, Pools or Hot Tubs      May change dressing routinely, keep a clean dry dressing in place.    Weight Bearing As Tolerated      Protected weight bearing as tolerated, with cane, crutches or walker as appropriate for condition and safety.          Follow-up Appointments:    Future Appointments   Date Time Provider Department Center   11/26/2019 10:15 AM Foreign Shirley PA-C [facility] None     Additional Instructions for the Follow-ups that You Need to Schedule     Ambulatory Referral to Home Health   As directed      Face to Face Visit Date:  11/14/2019    Follow-up provider for Plan of Care?:  I will be treating the patient on an ongoing basis.  Please send me the Plan of Care for signature.    Follow-up provider:  ADOLFO MCBRIDE [[id number]]    Reason/Clinical Findings:  s/p right EDMAR    Describe mobility limitations that make leaving home difficult:  Decreased mobility and strength    Nursing/Therapeutic  Services Requested:  Physical Therapy    PT orders:  Total joint pathway Therapeutic exercise Gait Training Transfer training Strengthening    Weight Bearing Status:  As Tolerated    Frequency:  1 Week 1         Apply Ice to Affected Extremity Every 2 Hours   As directed      Only 2 hours maximum at a time, only three to four times every 24 hours.    Order Comments:  Only 2 hours maximum at a time, only three to four times every 24 hours.          Discharge Follow-up with PCP   As directed       Currently Documented PCP:    Elvi Tobar APRN    PCP Phone Number:    [phone]     Follow Up Details:  with patient primary care physician in 1 -3 months as appropriate.         Discharge Follow-up with Specified Provider: Benny Ashley MD   As directed      To:  Benny Ashley MD    Follow Up Details:  at scheduled appointment - check on [facility] patient appointments for date and time.               Test Results Pending at Discharge:     Order Current Status    Tissue Pathology Exam In process             Foreign Shirley PA-C  11/14/19  12:29 PM    Time: Discharge 20 min

## 2019-11-14 NOTE — PLAN OF CARE
Problem: Patient Care Overview  Goal: Plan of Care Review   11/14/19 1605   Coping/Psychosocial   Plan of Care Reviewed With patient;spouse   OTHER   Outcome Summary Pt ambulated 150' x 2 with CGA and without LOB. He ascended and descended 4 steps with verbal cues on RW and foot placement.    Plan of Care Review   Progress improving

## 2019-11-14 NOTE — THERAPY TREATMENT NOTE
"Patient Name: Rory Lal  : 1960    MRN: [id number]                              Today's Date: 2019       Admit Date: 2019    Visit Dx:     ICD-10-CM ICD-9-CM   1. Impaired mobility and ADLs Z74.09 799.89   2. Primary osteoarthritis of right hip M16.11 715.15   3. S/P hip replacement, right Z96.641 V43.64     Patient Active Problem List   Diagnosis   • History of fusion of lumbar spine   • Arthralgia of hip, right   • Primary osteoarthritis of right hip   • Chronic midline low back pain   • Dyslipidemia     Past Medical History:   Diagnosis Date   • Arthritis    • Chronic left-sided low back pain    • Chronic narcotic use    • Chronic right hip pain    • History of chest pain 2019    Patient denies, noted by Dr. Gil on 2015   • History of MRSA infection 2015    Per pt, Left foot; treated with antibiotics and no reoccurence    • History of palpitations 2019    Patient denies, noted by Dr. Gil on 2015   • Hx of colonic polyp    • Hyperlipidemia    • Hypertension     Patient denies, noted by Dr. Gil 2015   • Impaired functional mobility, balance, gait, and endurance    • Impaired gait     R/T right hip pain    • Insomnia    • Lumbosacral disc disease    • Muscle spasm    • Neuropathy    • Non compliance w medication regimen     Patient reported \"I should take cholesterol medicine but I don't.\"    • Prehypertension    • RSD (reflex sympathetic dystrophy) 2015    MD note    • Smokeless tobacco use    • Use of cane as ambulatory aid    • Wears glasses     reading     Past Surgical History:   Procedure Laterality Date   • BACK SURGERY      S1-L5 fusion   • COLONOSCOPY     • DIGIT REATTACHMENT Left     Injury from construction work     • HERNIA REPAIR Right     Inguinal    • TOTAL HIP ARTHROPLASTY Right 2019    Procedure: total hip arthroplasty, right;  Surgeon: Syd Ashley MD;  Location: [facility];  Service: Orthopedics "     General Information     Row Name 11/14/19 1335 11/14/19 1052       PT Evaluation Time/Intention    Document Type  therapy note (daily note)  (Pended)   -MR  therapy note (daily note)  -JR (r) MR (t) JR (c)    Mode of Treatment  physical therapy  (Pended)   -MR  physical therapy  -JR (r) MR (t) JR (c)    Row Name 11/14/19 1335 11/14/19 1052       General Information    Patient Profile Reviewed?  yes  (Pended)   -MR  yes  -JR (r) MR (t) JR (c)    Row Name 11/14/19 1052          Cognitive Assessment/Intervention- PT/OT    Orientation Status (Cognition)  oriented x 4  -JR (r) MR (t) JR (c)     Row Name 11/14/19 1052          Safety Issues, Functional Mobility    Safety Issues Affecting Function (Mobility)  impulsivity;sequencing abilities;safety precautions follow-through/compliance  -JR (r) MR (t) JR (c)     Impairments Affecting Function (Mobility)  endurance/activity tolerance;pain;strength;balance;coordination  -JR (r) MR (t) JR (c)       User Key  (r) = Recorded By, (t) = Taken By, (c) = Cosigned By    Initials Name Provider Type    Raya Wan, PT Physical Therapist    Sylvie Garzon, PT Student PT Student        Mobility     Row Name 11/14/19 1052          Bed Mobility Assessment/Treatment    Bed Mobility Assessment/Treatment  supine-sit  -JR (r) MR (t) JR (c)     Supine-Sit [place] (Bed Mobility)  supervision  -JR (r) MR (t) JR (c)     Assistive Device (Bed Mobility)  bed rails;head of bed elevated  -JR (r) MR (t) JR (c)     Row Name 11/14/19 1335 11/14/19 1052       Sit-Stand Transfer    Sit-Stand [place]sey (Transfers)  conditional independence  (Pended)   -MR  supervision  -JR (r) MR (t) JR (c)    Assistive Device (Sit-Stand Transfers)  walker, front-wheeled  (Pended)   -MR  walker, front-wheeled  -JR (r) MR (t) JR (c)    Row Name 11/14/19 1335 11/14/19 1052       Gait/Stairs Assessment/Training    Gait/Stairs Assessment/Training  gait/ambulation assistive device  (Pended)   -MR   gait/ambulation assistive device  -JR (r) MR (t) JR (c)    [place] Level (Gait)  verbal cues;contact guard  (Pended)   -MR  verbal cues;contact guard  -JR (r) MR (t) JR (c)    Assistive Device (Gait)  walker, front-wheeled  (Pended)   -MR  walker, front-wheeled  -JR (r) MR (t) JR (c)    Distance in Feet (Gait)  150' x 2  (Pended)   -MR  100'  -JR (r) MR (t) JR (c)    Pattern (Gait)  step-through  (Pended)   -MR  step-through  -JR (r) MR (t) JR (c)    Deviations/Abnormal Patterns (Gait)  princess decreased;gait speed decreased;right sided deviations;stride length decreased  (Pended)   -MR  princess decreased;gait speed decreased;right sided deviations;stride length decreased  -JR (r) MR (t) JR (c)    Bilateral Gait Deviations  heel strike decreased  (Pended)   -MR  heel strike decreased  -JR (r) MR (t) JR (c)    Right Sided Gait Deviations  weight shift ability decreased;knee hyperextension  (Pended)   -MR  weight shift ability decreased;knee hyperextension  -JR (r) MR (t) JR (c)    Negotiation (Stairs)  stairs assistive device;handrail location;number of steps;ascending technique;descending technique  (Pended)   -MR  --    [place]ley Level (Stairs)  contact guard;verbal cues  (Pended)   -MR  --    Assistive Device (Stairs)  walker, rolling platform  (Pended)   -MR  --    Handrail Location (Stairs)  left side (ascending)  (Pended)   -MR  --    Number of Steps (Stairs)  4  (Pended)   -MR  --    Ascending Technique (Stairs)  step-to-step  (Pended)   -MR  --    Descending Technique (Stairs)  step-to-step  (Pended)   -MR  --    Stairs, Safety Issues  weight-shifting ability decreased  (Pended)   -MR  --    Stairs, Impairments  strength decreased;impaired balance;pain  (Pended)   -MR  --    Row Name 11/14/19 1335 11/14/19 1052       Mobility Assessment/Intervention    Extremity Weight-bearing Status  right lower extremity  (Pended)   -MR  right lower extremity  -JR (r) MR (t) JR (c)    Right Lower Extremity  (Weight-bearing Status)  weight-bearing as tolerated (WBAT)  (Pended)   -MR  weight-bearing as tolerated (WBAT)  -JR (r) MR (t) JR (c)      User Key  (r) = Recorded By, (t) = Taken By, (c) = Cosigned By    Initials Name Provider Type    Raya Wan, PT Physical Therapist    Sylvie Garzon, PT Student PT Student        Obj/Interventions     Row Name 11/14/19 1052          Therapeutic Exercise    Lower Extremity (Therapeutic Exercise)  gluteal sets;heel slides, right;LAQ (long arc quad), right;quad sets, right  -JR (r) MR (t) JR (c)     Lower Extremity Range of Motion (Therapeutic Exercise)  ankle dorsiflexion/plantar flexion, bilateral  -JR (r) MR (t) JR (c)     Exercise Type (Therapeutic Exercise)  isotonic contraction, concentric;isometric contraction, static  -JR (r) MR (t) JR (c)     Sets/Reps (Therapeutic Exercise)  3 x a day per sheet given to pt.   -JR (r) MR (t) JR (c)     Expected Outcome (Therapeutic Exercise)  improve performance, gait skills;improve performance, transfer skills  -JR (r) MR (t) JR (c)     Row Name 11/14/19 1052          Static Sitting Balance    Level of [place] (Unsupported Sitting, Static Balance)  conditional independence  -JR (r) MR (t) JR (c)     Sitting Position (Unsupported Sitting, Static Balance)  sitting on edge of bed  -JR (r) MR (t) JR (c)     Time Able to Maintain Position (Unsupported Sitting, Static Balance)  1 to 2 minutes  -JR (r) MR (t) JR (c)     Row Name 11/14/19 1052          Static Standing Balance    Level of [place] (Supported Standing, Static Balance)  contact guard assist  -JR (r) MR (t) JR (c)     Time Able to Maintain Position (Supported Standing, Static Balance)  1 to 2 minutes  -JR (r) MR (t) JR (c)     Assistive Device Utilized (Supported Standing, Static Balance)  walker, rolling  -JR (r) MR (t) JR (c)       User Key  (r) = Recorded By, (t) = Taken By, (c) = Cosigned By    Initials Name Provider Type    Raya Wan, COLT Physical Therapist     Sylvie Garzon, PT Student PT Student        Goals/Plan    No documentation.       Clinical Impression     Row Name 11/14/19 1335 11/14/19 1058       Pain Assessment    Additional Documentation  Pain Scale: FACES Pre/Post-Treatment (Group)  (Pended)   -MR  Pain Scale: Numbers Pre/Post-Treatment (Group)  -JR (r) MR (t) JR (c)    Row Name 11/14/19 1229          Pain Scale: Numbers Pre/Post-Treatment    Pain Scale: Numbers, Pretreatment  9/10  -JR (r) MR (t) JR (c)     Pain Scale: Numbers, Post-Treatment  9/10  -JR (r) MR (t) JR (c)     Pain Location - Side  Right  -JR (r) MR (t) JR (c)     Pain Location  hip  -JR (r) MR (t) JR (c)     Pain Intervention(s)  Ambulation/increased activity  -JR (r) MR (t) JR (c)     Row Name 11/14/19 8547          Pain Scale: FACES Pre/Post-Treatment    Pain: FACES Scale, Pretreatment  2-->hurts little bit  (Pended)   -MR     Pain: FACES Scale, Post-Treatment  2-->hurts little bit  (Pended)   -MR     Pre/Post Treatment Pain Comment  Pt took medication 20 minutes before treatment.   (Pended)   -MR     Row Name 11/14/19 4440 11/14/19 1050       Vital Signs    O2 Delivery Pre Treatment  --  room air  -JR (r) MR (t) JR (c)    O2 Delivery Intra Treatment  --  room air  -JR (r) MR (t) JR (c)    O2 Delivery Post Treatment  --  room air  -JR (r) MR (t) JR (c)    Pre Patient Position  Standing  (Pended)   -MR  Supine  -JR (r) MR (t) JR (c)    Intra Patient Position  Standing  (Pended)   -MR  Standing  -JR (r) MR (t) JR (c)    Post Patient Position  Sitting  (Pended)   -MR  Supine  -JR (r) MR (t) JR (c)    Row Name 11/14/19 4652 11/14/19 4925       Positioning and Restraints    Pre-Treatment Position  bathroom  (Pended)   -MR  in bed  -JR (r) MR (t) JR (c)    Post Treatment Position  chair  (Pended)   -MR  bed  -JR (r) MR (t) JR (c)    In Bed  --  with family/caregiver;supine;encouraged to call for assist;call light within reach  - (r) MR (t)  (c)    In Chair  call light within  reach;sitting;with family/caregiver;encouraged to call for assist  (Pended)   -MR  --      User Key  (r) = Recorded By, (t) = Taken By, (c) = Cosigned By    Initials Name Provider Type    Raya Wan, PT Physical Therapist    Sylvie Garzon, PT Student PT Student        Outcome Measures     Row Name 11/14/19 1335 11/14/19 1052       How much help from another person do you currently need...    Turning from your back to your side while in flat bed without using bedrails?  3  (Pended)   -MR  3  -JR (r) MR (t) JR (c)    Moving from lying on back to sitting on the side of a flat bed without bedrails?  3  (Pended)   -MR  3  -JR (r) MR (t) JR (c)    Moving to and from a bed to a chair (including a wheelchair)?  3  (Pended)   -MR  3  -JR (r) MR (t) JR (c)    Standing up from a chair using your arms (e.g., wheelchair, bedside chair)?  3  (Pended)   -MR  3  -JR (r) MR (t) JR (c)    Climbing 3-5 steps with a railing?  3  (Pended)   -MR  2  -JR (r) MR (t) JR (c)    To walk in hospital room?  3  (Pended)   -MR  3  -JR (r) MR (t) JR (c)    AM-PAC 6 Clicks Score (PT)  18  (Pended)   -MR  17  -JR (r) MR (t)    Row Name 11/14/19 1335 11/14/19 1052       Functional Assessment    Outcome Measure Options  AM-PAC 6 Clicks Basic Mobility (PT)  (Pended)   -MR  AM-PAC 6 Clicks Basic Mobility (PT)  -JR (r) MR (t) JR (c)      User Key  (r) = Recorded By, (t) = Taken By, (c) = Cosigned By    Initials Name Provider Type    Raya Wan, PT Physical Therapist    Sylvie Garzon, PT Student PT Student        Physical Therapy Education     Title: PT OT SLP Therapies (Done)     Topic: Physical Therapy (Done)     Point: Mobility training (Done)     Learning Progress Summary           Patient Acceptance, E,TB, VU by MR at 11/14/2019  2:24 PM    Comment:  Pt instructed on stairs with RW.    Acceptance, E, VU by SM at 11/14/2019  1:00 PM    Acceptance, E,TB, VU by MR at 11/14/2019 12:50 PM    Comment:  Pt instructed on gait with RW and ther  ex listed on flow sheet.    Acceptance, E,TB, VU by MR at 11/13/2019  2:17 PM    Comment:  Pt instructed on ambulation with RW and ther ex listed on flow sheet.    Acceptance, E,TB, VU by MR at 11/13/2019  1:03 PM    Comment:  Pt instructed on ambulation with RW and ther ex listed on flow sheet.    Acceptance, E,TB, VU by MR at 11/12/2019  3:29 PM    Comment:  Pt instructed on ROM precautions for posterior hip replacement and gait training with RW.   Family Acceptance, E,TB, VU by MR at 11/13/2019  1:03 PM    Comment:  Pt instructed on ambulation with RW and ther ex listed on flow sheet.   Significant Other Acceptance, E,TB, VU by MR at 11/14/2019  2:24 PM    Comment:  Pt instructed on stairs with RW.    Acceptance, E,TB, VU by MR at 11/14/2019 12:50 PM    Comment:  Pt instructed on gait with RW and ther ex listed on flow sheet.                   Point: Body mechanics (Done)     Learning Progress Summary           Patient Acceptance, E,TB, VU by MR at 11/14/2019  2:24 PM    Comment:  Pt instructed on stairs with RW.    Acceptance, E, VU by SM at 11/14/2019  1:00 PM    Acceptance, E,TB, VU by MR at 11/14/2019 12:50 PM    Comment:  Pt instructed on gait with RW and ther ex listed on flow sheet.    Acceptance, E,TB, VU by MR at 11/13/2019  2:17 PM    Comment:  Pt instructed on ambulation with RW and ther ex listed on flow sheet.    Acceptance, E,TB, VU by MR at 11/13/2019  1:03 PM    Comment:  Pt instructed on ambulation with RW and ther ex listed on flow sheet.    Acceptance, E,TB, VU by MR at 11/12/2019  3:29 PM    Comment:  Pt instructed on ROM precautions for posterior hip replacement and gait training with RW.   Family Acceptance, E,TB, VU by MR at 11/13/2019  1:03 PM    Comment:  Pt instructed on ambulation with RW and ther ex listed on flow sheet.   Significant Other Acceptance, E,TB, VU by MR at 11/14/2019  2:24 PM    Comment:  Pt instructed on stairs with RW.    Acceptance, E,TB, VU by MR at 11/14/2019 12:50  PM    Comment:  Pt instructed on gait with RW and ther ex listed on flow sheet.                               User Key     Initials Effective Dates Name Provider Type Discipline     10/11/17 -  Lacey Garcia, RN Registered Nurse Nurse    MR 11/06/19 -  Sylvie De La Garza, PT Student PT Student PT              PT Recommendation and Plan  Planned Therapy Interventions (PT Eval): balance training, transfer training, bed mobility training, strengthening, gait training  Outcome Summary/Treatment Plan (PT)  Anticipated Equipment Needs at Discharge (PT): front wheeled walker  Anticipated Discharge Disposition (PT): home with home health  Plan of Care Reviewed With: (P) patient, spouse  Progress: (P) improving  Outcome Summary: (P) Pt ambulated 150' x 2 with CGA and minimal verbal cues. He ascended and descended 4 steps with verbal cues on RW and foot placement.      Time Calculation:   PT Charges     Row Name 11/14/19 1335 11/14/19 1052          Time Calculation    Start Time  1335  (Pended)   -MR  1052  -JR (r) MR (t) JR (c)     Stop Time  1355  (Pended)   -MR  1119  -JR (r) MR (t) JR (c)     Time Calculation (min)  20 min  (Pended)   -MR  27 min  -JR (r) MR (t)     PT Received On  11/14/19  (Pended)   -MR  11/14/19  -JR (r) MR (t) JR (c)     PT Goal Re-Cert Due Date  11/22/19  (Pended)   -MR  11/22/19  -JR (r) MR (t) JR (c)       User Key  (r) = Recorded By, (t) = Taken By, (c) = Cosigned By    Initials Name Provider Type    Raya Wan, PT Physical Therapist    MR FredericSylvie, PT Student PT Student        Therapy Charges for Today     Code Description Service Date Service Provider Modifiers Qty    18457248405 HC PT THER PROC GROUP 11/13/2019 Sylvie De La Garza, PT Student GP 1    89715285305 HC GAIT TRAINING EA 15 MIN 11/13/2019 Sylvie De La Garza, PT Student GP 1    08056649593 HC PT THER PROC GROUP 11/13/2019 Sylvie De La Garza, PT Student GP 1    [id number] HC GAIT TRAINING EA 15 MIN 11/13/2019 Sylvie De La Garza, PT Student GP 1     [id number] HC GAIT TRAINING EA 15 MIN 11/14/2019 Sylvie De La Garza, PT Student GP 1    48140013221 HC PT THER PROC GROUP 11/14/2019 Sylvie De La Garza, PT Student GP 1    [id number] HC GAIT TRAINING EA 15 MIN 11/14/2019 Sylvie De La Garza, PT Student GP 1          PT G-Codes  Outcome Measure Options: (P) AM-PAC 6 Clicks Basic Mobility (PT)  AM-PAC 6 Clicks Score (PT): (P) 18  AM-PAC 6 Clicks Score (OT): 21    Sylvie De La Garza PT Student  11/14/2019

## 2019-11-14 NOTE — THERAPY TREATMENT NOTE
"Patient Name: Rory Lal  : 1960    MRN: [id number]                              Today's Date: 2019       Admit Date: 2019    Visit Dx:     ICD-10-CM ICD-9-CM   1. Impaired mobility and ADLs Z74.09 799.89   2. Primary osteoarthritis of right hip M16.11 715.15   3. S/P hip replacement, right Z96.641 V43.64     Patient Active Problem List   Diagnosis   • History of fusion of lumbar spine   • Arthralgia of hip, right   • Primary osteoarthritis of right hip   • Chronic midline low back pain   • Dyslipidemia     Past Medical History:   Diagnosis Date   • Arthritis    • Chronic left-sided low back pain    • Chronic narcotic use    • Chronic right hip pain    • History of chest pain 2019    Patient denies, noted by Dr. Gil on 2015   • History of MRSA infection 2015    Per pt, Left foot; treated with antibiotics and no reoccurence    • History of palpitations 2019    Patient denies, noted by Dr. Gil on 2015   • Hx of colonic polyp    • Hyperlipidemia    • Hypertension     Patient denies, noted by Dr. Gil 2015   • Impaired functional mobility, balance, gait, and endurance    • Impaired gait     R/T right hip pain    • Insomnia    • Lumbosacral disc disease    • Muscle spasm    • Neuropathy    • Non compliance w medication regimen     Patient reported \"I should take cholesterol medicine but I don't.\"    • Prehypertension    • RSD (reflex sympathetic dystrophy) 2015    MD note    • Smokeless tobacco use    • Use of cane as ambulatory aid    • Wears glasses     reading     Past Surgical History:   Procedure Laterality Date   • BACK SURGERY      S1-L5 fusion   • COLONOSCOPY     • DIGIT REATTACHMENT Left     Injury from construction work     • HERNIA REPAIR Right     Inguinal    • TOTAL HIP ARTHROPLASTY Right 2019    Procedure: total hip arthroplasty, right;  Surgeon: Syd Ashley MD;  Location: [facility];  Service: Orthopedics "     General Information     Row Name 11/14/19 1052          PT Evaluation Time/Intention    Document Type  therapy note (daily note)  (Pended)   -MR     Mode of Treatment  physical therapy  (Pended)   -     Row Name 11/14/19 1052          General Information    Patient Profile Reviewed?  yes  (Pended)   -     Row Name 11/14/19 1052          Cognitive Assessment/Intervention- PT/OT    Orientation Status (Cognition)  oriented x 4  (Pended)   -     Row Name 11/14/19 1052          Safety Issues, Functional Mobility    Safety Issues Affecting Function (Mobility)  impulsivity;sequencing abilities;safety precautions follow-through/compliance  (Pended)   -MR     Impairments Affecting Function (Mobility)  endurance/activity tolerance;pain;strength;balance;coordination  (Pended)   -MR       User Key  (r) = Recorded By, (t) = Taken By, (c) = Cosigned By    Initials Name Provider Type    Sylvie Garzon, PT Student PT Student        Mobility     Row Name 11/14/19 1052          Bed Mobility Assessment/Treatment    Bed Mobility Assessment/Treatment  supine-sit  (Pended)   -MR     Supine-Sit [place] (Bed Mobility)  supervision  (Pended)   -MR     Assistive Device (Bed Mobility)  bed rails;head of bed elevated  (Pended)   -     Row Name 11/14/19 1052          Sit-Stand Transfer    Sit-Stand [place] (Transfers)  supervision  (Pended)   -MR     Assistive Device (Sit-Stand Transfers)  walker, front-wheeled  (Pended)   -     Row Name 11/14/19 1052          Gait/Stairs Assessment/Training    Gait/Stairs Assessment/Training  gait/ambulation assistive device  (Pended)   -MR     [place] Level (Gait)  verbal cues;contact guard  (Pended)   -MR     Assistive Device (Gait)  walker, front-wheeled  (Pended)   -MR     Distance in Feet (Gait)  100'  (Pended)   -MR     Pattern (Gait)  step-through  (Pended)   -MR     Deviations/Abnormal Patterns (Gait)  princess decreased;gait speed decreased;right sided deviations;stride  length decreased  (Pended)   -MR     Bilateral Gait Deviations  heel strike decreased  (Pended)   -MR     Right Sided Gait Deviations  weight shift ability decreased;knee hyperextension  (Pended)   -MR     Row Name 11/14/19 1052          Mobility Assessment/Intervention    Extremity Weight-bearing Status  right lower extremity  (Pended)   -MR     Right Lower Extremity (Weight-bearing Status)  weight-bearing as tolerated (WBAT)  (Pended)   -MR       User Key  (r) = Recorded By, (t) = Taken By, (c) = Cosigned By    Initials Name Provider Type    Sylvie Garzon, PT Student PT Student        Obj/Interventions     Row Name 11/14/19 1052          Therapeutic Exercise    Lower Extremity (Therapeutic Exercise)  gluteal sets;heel slides, right;LAQ (long arc quad), right;quad sets, right  (Pended)   -MR     Lower Extremity Range of Motion (Therapeutic Exercise)  ankle dorsiflexion/plantar flexion, bilateral  (Pended)   -MR     Exercise Type (Therapeutic Exercise)  isotonic contraction, concentric;isometric contraction, static  (Pended)   -MR     Sets/Reps (Therapeutic Exercise)  3 x a day per sheet given to pt.   (Pended)   -MR     Expected Outcome (Therapeutic Exercise)  improve performance, gait skills;improve performance, transfer skills  (Pended)   -MR     Row Name 11/14/19 1052          Static Sitting Balance    Level of [place] (Unsupported Sitting, Static Balance)  conditional independence  (Pended)   -MR     Sitting Position (Unsupported Sitting, Static Balance)  sitting on edge of bed  (Pended)   -MR     Time Able to Maintain Position (Unsupported Sitting, Static Balance)  1 to 2 minutes  (Pended)   -MR     Row Name 11/14/19 1052          Static Standing Balance    Level of [place] (Supported Standing, Static Balance)  contact guard assist  (Pended)   -MR     Time Able to Maintain Position (Supported Standing, Static Balance)  1 to 2 minutes  (Pended)   -MR     Assistive Device Utilized (Supported  Standing, Static Balance)  walker, rolling  (Pended)   -MR       User Key  (r) = Recorded By, (t) = Taken By, (c) = Cosigned By    Initials Name Provider Type    Sylvie Garzon, PT Student PT Student        Goals/Plan    No documentation.       Clinical Impression     Row Name 11/14/19 1052          Pain Assessment    Additional Documentation  Pain Scale: Numbers Pre/Post-Treatment (Group)  (Pended)   -MR     Row Name 11/14/19 1052          Pain Scale: Numbers Pre/Post-Treatment    Pain Scale: Numbers, Pretreatment  9/10  (Pended)   -MR     Pain Scale: Numbers, Post-Treatment  9/10  (Pended)   -MR     Pain Location - Side  Right  (Pended)   -MR     Pain Location  hip  (Pended)   -MR     Pain Intervention(s)  Ambulation/increased activity  (Pended)   -MR     Row Name 11/14/19 1052          Vital Signs    O2 Delivery Pre Treatment  room air  (Pended)   -MR     O2 Delivery Intra Treatment  room air  (Pended)   -MR     O2 Delivery Post Treatment  room air  (Pended)   -MR     Pre Patient Position  Supine  (Pended)   -MR     Intra Patient Position  Standing  (Pended)   -MR     Post Patient Position  Supine  (Pended)   -MR     [facility] Name 11/14/19 1052          Positioning and Restraints    Pre-Treatment Position  in bed  (Pended)   -MR     Post Treatment Position  bed  (Pended)   -MR     In Bed  with family/caregiver;supine;encouraged to call for assist;call light within reach  (Pended)   -MR       User Key  (r) = Recorded By, (t) = Taken By, (c) = Cosigned By    Initials Name Provider Type    Sylvie Garzon, PT Student PT Student        Outcome Measures     Row Name 11/14/19 1052          How much help from another person do you currently need...    Turning from your back to your side while in flat bed without using bedrails?  3  (Pended)   -MR     Moving from lying on back to sitting on the side of a flat bed without bedrails?  3  (Pended)   -MR     Moving to and from a bed to a chair (including a wheelchair)?  3   (Pended)   -MR     Standing up from a chair using your arms (e.g., wheelchair, bedside chair)?  3  (Pended)   -MR     Climbing 3-5 steps with a railing?  2  (Pended)   -MR     To walk in hospital room?  3  (Pended)   -MR     AM-PAC 6 Clicks Score (PT)  17  (Pended)   -     Row Name 11/14/19 1052          Functional Assessment    Outcome Measure Options  AM-PAC 6 Clicks Basic Mobility (PT)  (Pended)   -MR       User Key  (r) = Recorded By, (t) = Taken By, (c) = Cosigned By    Initials Name Provider Type    Sylvie Garzon, PT Student PT Student        Physical Therapy Education     Title: PT OT SLP Therapies (In Progress)     Topic: Physical Therapy (Done)     Point: Mobility training (Done)     Learning Progress Summary           Patient Acceptance, E,TB, VU by MR at 11/14/2019 12:50 PM    Comment:  Pt instructed on gait with RW and ther ex listed on flow sheet.    Acceptance, E,TB, VU by MR at 11/13/2019  2:17 PM    Comment:  Pt instructed on ambulation with RW and ther ex listed on flow sheet.    Acceptance, E,TB, VU by MR at 11/13/2019  1:03 PM    Comment:  Pt instructed on ambulation with RW and ther ex listed on flow sheet.    Acceptance, E,TB, VU by MR at 11/12/2019  3:29 PM    Comment:  Pt instructed on ROM precautions for posterior hip replacement and gait training with RW.   Family Acceptance, E,TB, VU by MR at 11/13/2019  1:03 PM    Comment:  Pt instructed on ambulation with RW and ther ex listed on flow sheet.   Significant Other Acceptance, E,TB, VU by MR at 11/14/2019 12:50 PM    Comment:  Pt instructed on gait with RW and ther ex listed on flow sheet.                   Point: Body mechanics (Done)     Learning Progress Summary           Patient Acceptance, E,TB, VU by MR at 11/14/2019 12:50 PM    Comment:  Pt instructed on gait with RW and ther ex listed on flow sheet.    Acceptance, E,TB, VU by MR at 11/13/2019  2:17 PM    Comment:  Pt instructed on ambulation with RW and ther ex listed on flow  sheet.    Acceptance, E,TB, VU by MR at 11/13/2019  1:03 PM    Comment:  Pt instructed on ambulation with RW and ther ex listed on flow sheet.    Acceptance, E,TB, VU by MR at 11/12/2019  3:29 PM    Comment:  Pt instructed on ROM precautions for posterior hip replacement and gait training with RW.   Family Acceptance, E,TB, VU by MR at 11/13/2019  1:03 PM    Comment:  Pt instructed on ambulation with RW and ther ex listed on flow sheet.   Significant Other Acceptance, E,TB, VU by MR at 11/14/2019 12:50 PM    Comment:  Pt instructed on gait with RW and ther ex listed on flow sheet.                               User Key     Initials Effective Dates Name Provider Type Discipline    MR 11/06/19 -  Sylvie De La Garza, PT Student PT Student PT              PT Recommendation and Plan  Planned Therapy Interventions (PT Eval): balance training, transfer training, bed mobility training, strengthening, gait training  Outcome Summary/Treatment Plan (PT)  Anticipated Equipment Needs at Discharge (PT): front wheeled walker  Anticipated Discharge Disposition (PT): home with home health  Plan of Care Reviewed With: (P) patient, spouse  Progress: (P) improving  Outcome Summary: (P) Pt reports more pain today. Pt's foot placement has improved with gait training and he is independent with performing his ther ex per flow sheet.      Time Calculation:   PT Charges     Row Name 11/14/19 1052             Time Calculation    Start Time  1052  (Pended)   -MR      Stop Time  1119  (Pended)   -MR      Time Calculation (min)  27 min  (Pended)   -MR      PT Received On  11/14/19  (Pended)   -MR      PT Goal Re-Cert Due Date  11/22/19  (Pended)   -MR        User Key  (r) = Recorded By, (t) = Taken By, (c) = Cosigned By    Initials Name Provider Type    Sylvie Garzon, PT Student PT Student        Therapy Charges for Today     Code Description Service Date Service Provider Modifiers Qty    41212564753 HC PT THER PROC GROUP 11/13/2019 FredericSylvie  PT Student GP 1    64110000887 HC GAIT TRAINING EA 15 MIN 11/13/2019 Sylvie De La Garza, PT Student GP 1    67925798357 HC PT THER PROC GROUP 11/13/2019 Sylvie De La Garza, PT Student GP 1    98310675639 HC GAIT TRAINING EA 15 MIN 11/13/2019 Sylvie De La Garza, PT Student GP 1    73877250994 HC GAIT TRAINING EA 15 MIN 11/14/2019 Sylvie De La Garza, PT Student GP 1    32329871154 HC PT THER PROC GROUP 11/14/2019 Sylvie De La Garza, PT Student GP 1          PT G-Codes  Outcome Measure Options: AM-PAC 6 Clicks Daily Activity (OT)  AM-PAC 6 Clicks Score (PT): (P) 17  AM-PAC 6 Clicks Score (OT): 21    Sylvie De La Garza PT Student  11/14/2019

## 2019-11-14 NOTE — THERAPY TREATMENT NOTE
"Acute Care - Occupational Therapy Treatment Note   [place]     Patient Name: Rory Lal  : 1960  MRN: [id number]  Today's Date: 2019  Onset of Illness/Injury or Date of Surgery: 19  Date of Referral to OT: 19  Referring Physician: Gabi Bowden    Admit Date: 2019       ICD-10-CM ICD-9-CM   1. Impaired mobility and ADLs Z74.09 799.89   2. Primary osteoarthritis of right hip M16.11 715.15   3. S/P hip replacement, right Z96.641 V43.64     Patient Active Problem List   Diagnosis   • History of fusion of lumbar spine   • Arthralgia of hip, right   • Primary osteoarthritis of right hip   • Chronic midline low back pain   • Dyslipidemia     Past Medical History:   Diagnosis Date   • Arthritis    • Chronic left-sided low back pain    • Chronic narcotic use    • Chronic right hip pain    • History of chest pain 2019    Patient denies, noted by Dr. Gil on 2015   • History of MRSA infection 2015    Per pt, Left foot; treated with antibiotics and no reoccurence    • History of palpitations 2019    Patient denies, noted by Dr. Gil on 2015   • Hx of colonic polyp    • Hyperlipidemia    • Hypertension     Patient denies, noted by Dr. Gil 2015   • Impaired functional mobility, balance, gait, and endurance    • Impaired gait     R/T right hip pain    • Insomnia    • Lumbosacral disc disease    • Muscle spasm    • Neuropathy    • Non compliance w medication regimen     Patient reported \"I should take cholesterol medicine but I don't.\"    • Prehypertension    • RSD (reflex sympathetic dystrophy) 2015    MD note    • Smokeless tobacco use    • Use of cane as ambulatory aid    • Wears glasses     reading     Past Surgical History:   Procedure Laterality Date   • BACK SURGERY      S1-L5 fusion   • COLONOSCOPY     • DIGIT REATTACHMENT Left     Injury from construction work     • HERNIA REPAIR Right     Inguinal    • TOTAL HIP ARTHROPLASTY Right " 11/12/2019    Procedure: total hip arthroplasty, right;  Surgeon: Syd Ashley MD;  Location: [facility];  Service: Orthopedics       Therapy Treatment    Rehabilitation Treatment Summary     Row Name 11/14/19 1132             Treatment Time/Intention    Discipline  occupational therapist  -      Document Type  therapy note (daily note)  -      Subjective Information  complains of;pain  -      Mode of Treatment  occupational therapy  -      Patient/Family Observations  Pt received supine in bed  -      Care Plan Review  care plan/treatment goals reviewed;patient/other agree to care plan  -      Care Plan Review, Other Participant(s)  spouse  -      Patient Effort  good  -      Recorded by [] Patricia James 11/14/19 1200      Row Name 11/14/19 1132             Motor Skills Assessment/Interventions    Additional Documentation  Therapeutic Exercise Interventions (Group);Therapeutic Exercise (Group)  -      Recorded by [] Patricia James 11/14/19 1200      Row Name 11/14/19 1132             Therapeutic Exercise    Upper Extremity Range of Motion (Therapeutic Exercise)  shoulder flexion/extension, bilateral;shoulder horizontal abduction/adduction, bilateral;elbow flexion/extension, bilateral  -      Weight/Resistance (Therapeutic Exercise)  red  -      Exercise Type (Therapeutic Exercise)  resistive exercises  -      Position (Therapeutic Exercise)  supine  -      Sets/Reps (Therapeutic Exercise)  3 x 10  -      Recorded by [] Patricia James 11/14/19 1200      Row Name 11/14/19 1132             Positioning and Restraints    Pre-Treatment Position  in bed  -      Post Treatment Position  bed  -      In Bed  supine;call light within reach;encouraged to call for assist  -      Recorded by [] Patricia James 11/14/19 1200      Row Name 11/14/19 1132             Pain Scale: Numbers Pre/Post-Treatment    Pain Scale: Numbers, Pretreatment  9/10  -      Pain Scale: Numbers,  Post-Treatment  9/10  -AH      Pain Location - Side  Right  -AH      Pain Location  hip  -AH      Pain Intervention(s)  Repositioned;Ambulation/increased activity  -AH      Recorded by [] Patricia James 11/14/19 1200      Row Name                Wound 11/12/19 0928 Right hip Incision    Wound - Properties Group Date first assessed: 11/12/19 [PB] Time first assessed: 0928 [PB] Side: Right [PB] Location: hip [PB] Primary Wound Type: Incision [PB] Recorded by:  [PB] Mary Solares RN 11/12/19 0928    Row Name 11/14/19 1132             Coping    Observed Emotional State  accepting;cooperative  -      Verbalized Emotional State  acceptance  -AH      Recorded by [] Patricia James 11/14/19 1200      Row Name 11/14/19 1132             Plan of Care Review    Plan of Care Reviewed With  patient  -      Recorded by [] Patricia James 11/14/19 1200      Row Name 11/14/19 1132             Outcome Summary/Treatment Plan (OT)    Daily Summary of Progress (OT)  progress toward functional goals as expected  -      Anticipated Discharge Disposition (OT)  home with home health  -      Recorded by [] Patricia James 11/14/19 1200        User Key  (r) = Recorded By, (t) = Taken By, (c) = Cosigned By    Initials Name Effective Dates Discipline    Patricia Larkin 03/07/18 -  OT    PB Mary Solares RN 11/07/16 -  Nurse        Wound 11/12/19 0928 Right hip Incision (Active)   Dressing Appearance dry;intact 11/14/2019  8:43 AM   Closure NOEMI 11/14/2019  8:43 AM   Drainage Amount none 11/13/2019  7:50 PM   Dressing Care, Wound gauze;transparent film 11/13/2019  7:50 PM       Occupational Therapy Education     Title: PT OT SLP Therapies (In Progress)     Topic: Occupational Therapy (In Progress)     Point: ADL training (Done)     Description: Instruct learner(s) on proper safety adaptation and remediation techniques during self care or transfers.   Instruct in proper use of assistive devices.    Learning Progress Summary            Patient NAHUM Salcido D,H, VU,DU by  at 11/13/2019  1:56 PM    Comment:  Good comprehension and demonstration of AE with LB ADLs and completion of UE exs per HEP with use of theraband following written program.  Wife present during AE training and UE exs and verbalized understanding.    THOMAS SalcidoH, VU,DU by  at 11/13/2019  1:53 PM    Comment:  Educated per AE for LB dressing with good comprehension and demo of understanding.  Some verbal cues needing during use of AE.  Good understanding of UE therex per HEP and verbal cues for correct completion of 1 exercise.    Acceptance, ALEJANDRA,NAHUM, KARY by  at 11/12/2019  4:28 PM    Comment:  Role of OT/POC   Family NAHUM Salcido D,H, VU,RICHARD by  at 11/13/2019  1:56 PM    Comment:  Good comprehension and demonstration of AE with LB ADLs and completion of UE exs per HEP with use of theraband following written program.  Wife present during AE training and UE exs and verbalized understanding.    THOMAS Salcido H, VU,RICHARD by  at 11/13/2019  1:53 PM    Comment:  Educated per AE for LB dressing with good comprehension and demo of understanding.  Some verbal cues needing during use of AE.  Good understanding of UE therex per HEP and verbal cues for correct completion of 1 exercise.                   Point: Home exercise program (Done)     Description: Instruct learner(s) on appropriate technique for monitoring, assisting and/or progressing therapeutic exercises/activities.    Learning Progress Summary           Patient Acceptance, E,NAHUM, KARY by  at 11/14/2019 12:00 PM    Comment:  benefit of UB ex  safety with hip precautions and self care tasks.  Discussed AE for bathing/dressing.  Wife is going to check [employer] for equipment    NAHUM Salcido D,H, VU,DU by  at 11/13/2019  1:56 PM    Comment:  Good comprehension and demonstration of AE with LB ADLs and completion of UE exs per HEP with use of theraband following written program.  Wife present during AE training and UE exs and verbalized  understanding.    THOMAS Salcido,H, VU,DU by  at 11/13/2019  1:53 PM    Comment:  Educated per AE for LB dressing with good comprehension and demo of understanding.  Some verbal cues needing during use of AE.  Good understanding of UE therex per HEP and verbal cues for correct completion of 1 exercise.   Family Acceptance, NAHUM ROBERTS, KARY by  at 11/14/2019 12:00 PM    Comment:  benefit of UB ex  safety with hip precautions and self care tasks.  Discussed AE for bathing/dressing.  Wife is going to check [employer] for equipment    NAHUM Salcido D,H, VU,DU by  at 11/13/2019  1:56 PM    Comment:  Good comprehension and demonstration of AE with LB ADLs and completion of UE exs per HEP with use of theraband following written program.  Wife present during AE training and UE exs and verbalized understanding.    THOMAS Salcido,H, VU,DU by  at 11/13/2019  1:53 PM    Comment:  Educated per AE for LB dressing with good comprehension and demo of understanding.  Some verbal cues needing during use of AE.  Good understanding of UE therex per HEP and verbal cues for correct completion of 1 exercise.                   Point: Precautions (Done)     Description: Instruct learner(s) on prescribed precautions during self-care and functional transfers.    Learning Progress Summary           Patient Acceptance, NAHUM ROBERTS, KARY by  at 11/14/2019 12:00 PM    Comment:  benefit of UB ex  safety with hip precautions and self care tasks.  Discussed AE for bathing/dressing.  Wife is going to check [employer] for equipment    NAHUM Salcido D,H, VU,DU by  at 11/13/2019  1:56 PM    Comment:  Good comprehension and demonstration of AE with LB ADLs and completion of UE exs per HEP with use of theraband following written program.  Wife present during AE training and UE exs and verbalized understanding.    THOMAS Salcido,H, VU,DU by  at 11/13/2019  1:53 PM    Comment:  Educated per AE for LB dressing with good comprehension and demo of understanding.  Some verbal cues needing during  use of AE.  Good understanding of UE therex per HEP and verbal cues for correct completion of 1 exercise.   Family Krystin, NAHUM ROBERTS, KARY by  at 11/14/2019 12:00 PM    Comment:  benefit of UB ex  safety with hip precautions and self care tasks.  Discussed AE for bathing/dressing.  Wife is going to check [employer] for equipment    NAHUM Salcido D,H, VU,DU by  at 11/13/2019  1:56 PM    Comment:  Good comprehension and demonstration of AE with LB ADLs and completion of UE exs per HEP with use of theraband following written program.  Wife present during AE training and UE exs and verbalized understanding.    THOMAS Salcido H, KARY,RICHARD by  at 11/13/2019  1:53 PM    Comment:  Educated per AE for LB dressing with good comprehension and demo of understanding.  Some verbal cues needing during use of AE.  Good understanding of UE therex per HEP and verbal cues for correct completion of 1 exercise.                               User Key     Initials Effective Dates Name Provider Type Discipline     03/07/18 -  Patricia James Occupational Therapist OT     11/05/19 -  Sharon eMier OT Occupational Therapist OT                OT Recommendation and Plan  Outcome Summary/Treatment Plan (OT)  Daily Summary of Progress (OT): progress toward functional goals as expected  Anticipated Discharge Disposition (OT): home with home health  Therapy Frequency (OT Eval): daily(Mon-Fri)  Daily Summary of Progress (OT): progress toward functional goals as expected  Plan of Care Review  Plan of Care Reviewed With: patient  Plan of Care Reviewed With: patient  Outcome Summary: Pt continues with high pain report.  Pt able to perform UB strengthening ex 3 x10 reps as per flow sheet.  Pt was educated on the importance of using adaptive equipment for bathing/dressing.  Wife reports she will assist him and is planning to look on [employer] for equipment.  Outcome Measures     Row Name 11/14/19 1132 11/13/19 0934 11/12/19 1402       How much help from another is  currently needed...    Putting on and taking off regular lower body clothing?  3  -AH  3  -RM  2  -AH    Bathing (including washing, rinsing, and drying)  3  -AH  3  -RM  2  -AH    Toileting (which includes using toilet bed pan or urinal)  3  -AH  3  -RM  3  -AH    Putting on and taking off regular upper body clothing  4  -AH  4  -RM  4  -AH    Taking care of personal grooming (such as brushing teeth)  4  -AH  4  -RM  4  -AH    Eating meals  4  -AH  4  -RM  4  -AH    AM-PAC 6 Clicks Score (OT)  21  -AH  21  -RM  19  -AH       Functional Assessment    Outcome Measure Options  AM-PAC 6 Clicks Daily Activity (OT)  -AH  AM-PAC 6 Clicks Daily Activity (OT)  -RM  AM-PAC 6 Clicks Daily Activity (OT)  -      User Key  (r) = Recorded By, (t) = Taken By, (c) = Cosigned By    Initials Name Provider Type     Patricia James Occupational Therapist     Sharon Meier, OT Occupational Therapist           Time Calculation:   Time Calculation- OT     Row Name 11/14/19 1205             Time Calculation- OT    OT Start Time  1132  -      OT Stop Time  1150  -      OT Time Calculation (min)  18 min  -      OT Received On  11/14/19  -      OT Goal Re-Cert Due Date  11/22/19  -         Timed Charges    26219 - OT Therapeutic Exercise Minutes  18  -AH        User Key  (r) = Recorded By, (t) = Taken By, (c) = Cosigned By    Initials Name Provider Type     Patricia James Occupational Therapist        Therapy Charges for Today     Code Description Service Date Service Provider Modifiers Qty    [id number]  OT THER PROC EA 15 MIN 11/14/2019 Patricia James GO 1               Patricia James  11/14/2019

## 2019-11-15 LAB
LAB AP CASE REPORT: NORMAL
PATH REPORT.FINAL DX SPEC: NORMAL

## 2019-11-15 NOTE — PROGRESS NOTES
Case Management Discharge Note         Destination      No service has been selected for the patient.      Durable Medical Equipment      No service has been selected for the patient.      Dialysis/Infusion      No service has been selected for the patient.      Home Medical Care - Selection Complete      Service Provider Request Status Selected Services Address Phone Number Fax Number    [facility] Selected Home Health Services [address] [facility] [id number] [phone]      Therapy      No service has been selected for the patient.      Community Resources      No service has been selected for the patient.             Final Discharge Disposition Code: 06 - home with home health care

## 2019-11-26 ENCOUNTER — OFFICE VISIT (OUTPATIENT)
Dept: ORTHOPEDIC SURGERY | Facility: CLINIC | Age: 59
End: 2019-11-26

## 2019-11-26 VITALS — HEIGHT: 68 IN | BODY MASS INDEX: 29.25 KG/M2 | RESPIRATION RATE: 18 BRPM | WEIGHT: 193 LBS

## 2019-11-26 DIAGNOSIS — Z96.641 S/P HIP REPLACEMENT, RIGHT: Primary | ICD-10-CM

## 2019-11-26 PROCEDURE — 99024 POSTOP FOLLOW-UP VISIT: CPT | Performed by: PHYSICIAN ASSISTANT

## 2019-11-26 NOTE — PROGRESS NOTES
"Subjective   Patient ID: Rory Lal is a 59 y.o. male is here today for a post-operative visit.  Post-op of the Right Hip (EDMAR 11/12/19)       CHIEF COMPLAINT:    History of Present Illness      Pain controlled: [] no   [x] yes   Medication refill requested: [x] no   [] yes    Patient compliant with instructions: [] no   [x] yes   Other: Reports good progress since surgery.  Patient denies CP or SOA     Past Medical History:   Diagnosis Date   • Arthritis    • Chronic left-sided low back pain    • Chronic narcotic use    • Chronic right hip pain    • History of chest pain 11/07/2019    Patient denies, noted by Dr. Gil on 2/5/2015   • History of MRSA infection 2015    Per pt, Left foot; treated with antibiotics and no reoccurence    • History of palpitations 11/07/2019    Patient denies, noted by Dr. Gil on 2/5/2015   • Hx of colonic polyp    • Hyperlipidemia    • Hypertension     Patient denies, noted by Dr. Gil 2/5/2015   • Impaired functional mobility, balance, gait, and endurance    • Impaired gait     R/T right hip pain    • Insomnia    • Lumbosacral disc disease    • Muscle spasm    • Neuropathy    • Non compliance w medication regimen     Patient reported \"I should take cholesterol medicine but I don't.\"    • Prehypertension    • RSD (reflex sympathetic dystrophy) 02/05/2015    MD note    • Smokeless tobacco use    • Use of cane as ambulatory aid    • Wears glasses     reading        Past Surgical History:   Procedure Laterality Date   • BACK SURGERY  2004    S1-L5 fusion   • COLONOSCOPY     • 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   HENT: Negative for dental problem and sore throat.    Eyes: Negative for visual disturbance.   Respiratory: Negative for shortness of breath.    Cardiovascular: Negative for chest pain.   Gastrointestinal: Negative for abdominal pain, constipation, diarrhea, nausea and vomiting.   Genitourinary: Negative for difficulty urinating and frequency.   Musculoskeletal: Positive for arthralgias (mild hip pain occasionally).   Neurological: Negative for headaches.   Hematological: Does not bruise/bleed easily.       I have reviewed the above medical and surgical history, family history, social history, medications, allergies and review of systems.    Objective   Resp 18   Ht 171.5 cm (67.5\")   Wt 87.5 kg (193 lb)   BMI 29.78 kg/m²       Signs of infection: [x] no                    [] yes   Drainage: [x] no                    [] yes   Incision: [x] healing well     []healed well   Motor exam intact: [] no                    [x] yes   Neurovascular exam intact: [] no                    [x] yes   Signs of compartment syndrome: [x] no                    [] yes   Signs of DVT: [x] no                    [] yes   Other:      Physical Exam  Ortho Exam    Extremity DVT signs are Negative on physical exam with negative Jessica sign, with no calf pain, with no palpable cords, with no increased pain with passive stretch/extension and with no skin tone change  Neurologic Exam    Assessment/Plan   Independent Review of Radiographic Studies:    No new imaging done today.  Laboratory and Other Studies:  No new results reviewed today.   Medical Decision Making:    Stable neurovascular exam.       Sandy Valadez was seen today for post-op.    Diagnoses and all orders for this visit:    S/P hip replacement, right         Recommendations/Plan:     Sutures Staples or Pins [x] Removed today  [] At prior visit  [] Plan removal later   Physical therapy: " []rehab facility  []outpatient referral  [x] therapy ongoing   Ultrasound: [x]not ordered         []order given to patient   Labs: [x]not ordered         []order given to patient   Weight Bearing status: []Full [x]WBAT []PWB []NWB []Other     Regular exercise as tolerated  Guided on proper techniques for mobility, strength, agility and/or conditioning exercises  Physical therapy program ongoing      FU in 5 weeks    Patient is encouraged and agreeable to call or return sooner for any issues or concerns.

## 2019-12-31 ENCOUNTER — OFFICE VISIT (OUTPATIENT)
Dept: ORTHOPEDIC SURGERY | Facility: CLINIC | Age: 59
End: 2019-12-31

## 2019-12-31 VITALS — HEIGHT: 68 IN | WEIGHT: 193 LBS | RESPIRATION RATE: 18 BRPM | BODY MASS INDEX: 29.25 KG/M2

## 2019-12-31 DIAGNOSIS — Z96.641 S/P HIP REPLACEMENT, RIGHT: Primary | ICD-10-CM

## 2019-12-31 PROCEDURE — 99024 POSTOP FOLLOW-UP VISIT: CPT | Performed by: PHYSICIAN ASSISTANT

## 2019-12-31 NOTE — PROGRESS NOTES
"Subjective   Patient ID: Rory Lal is a 59 y.o.  male  Post-op of the Right Hip         History of Present Illness  Patient presents for a routine follow-up in regards to right total hip arthroplasty.  Patient states he is still having some soreness to the posterior right hip.  He denies numbness or tingling.  He informs me he was without physical therapy for approximately 3 weeks due to a \"mixup with home health\"                                                   Past Medical History:   Diagnosis Date   • Arthritis    • Chronic left-sided low back pain    • Chronic narcotic use    • Chronic right hip pain    • History of chest pain 11/07/2019    Patient denies, noted by Dr. Gil on 2/5/2015   • History of MRSA infection 2015    Per pt, Left foot; treated with antibiotics and no reoccurence    • History of palpitations 11/07/2019    Patient denies, noted by Dr. Gil on 2/5/2015   • Hx of colonic polyp    • Hyperlipidemia    • Hypertension     Patient denies, noted by Dr. Gil 2/5/2015   • Impaired functional mobility, balance, gait, and endurance    • Impaired gait     R/T right hip pain    • Insomnia    • Lumbosacral disc disease    • Muscle spasm    • Neuropathy    • Non compliance w medication regimen     Patient reported \"I should take cholesterol medicine but I don't.\"    • Prehypertension    • RSD (reflex sympathetic dystrophy) 02/05/2015    MD note    • Smokeless tobacco use    • Use of cane as ambulatory aid    • Wears glasses     reading        Past Surgical History:   Procedure Laterality Date   • BACK SURGERY  2004    S1-L5 fusion   • COLONOSCOPY     • 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    Social History     Socioeconomic " History   • Marital status:      Spouse name: Not on file   • Number of children: Not on file   • Years of education: Not on file   • Highest education level: Not on file   Occupational History     Employer: DISABLED   Tobacco Use   • Smoking status: Former Smoker     Types: Cigarettes   • Smokeless tobacco: Current User     Types: Snuff   • Tobacco comment: Patient reports only used briefly   Substance and Sexual Activity   • Alcohol use: No     Frequency: Never   • Drug use: No   • Sexual activity: Defer         Current Outpatient Medications:   •  acetaminophen (TYLENOL) 325 MG tablet, Take 650 mg by mouth Every 6 (Six) Hours As Needed., Disp: , Rfl:   •  atorvastatin (LIPITOR) 10 MG tablet, Take 10 mg by mouth Every Night., Disp: , Rfl:   •  gabapentin (NEURONTIN) 100 MG capsule, Take 200 mg by mouth At Night As Needed., Disp: , Rfl:   •  oxyCODONE (ROXICODONE) 15 MG immediate release tablet, Take 15 mg by mouth Every 6 (Six) Hours As Needed for Moderate Pain ., Disp: , Rfl:   •  oxyCODONE-acetaminophen (PERCOCET) 7.5-325 MG per tablet, Take 1 tablet by mouth At Night As Needed for breakthrough pain, Disp: 7 tablet, Rfl: 0  •  tiZANidine (ZANAFLEX) 4 MG tablet, Take 1 tablet by mouth At Night As Needed for Muscle Spasms., Disp: 14 tablet, Rfl: 0    Allergies   Allergen Reactions   • Pollen Extract Other (See Comments)     Watery eyes       Review of Systems   Constitutional: Negative for fever.   HENT: Negative for dental problem and voice change.    Eyes: Negative for visual disturbance.   Respiratory: Negative for shortness of breath.    Cardiovascular: Negative for chest pain.   Gastrointestinal: Negative for abdominal pain.   Genitourinary: Negative for dysuria.   Musculoskeletal: Positive for arthralgias. Negative for gait problem and joint swelling.   Skin: Negative for rash.   Neurological: Negative for speech difficulty.   Hematological: Does not bruise/bleed easily.   Psychiatric/Behavioral:  "Negative for confusion.       I have reviewed the above medical and surgical history, family history, social history, medications, allergies and review of systems.    Objective   Resp 18   Ht 171.5 cm (67.5\")   Wt 87.5 kg (193 lb)   BMI 29.78 kg/m²    Physical Exam   Constitutional: He is oriented to person, place, and time. He appears well-developed and well-nourished.   Eyes: Conjunctivae are normal.   Pulmonary/Chest: Effort normal. No respiratory distress.   Musculoskeletal:        Right hip: He exhibits no bony tenderness and no crepitus.   Neurological: He is alert and oriented to person, place, and time.   Psychiatric: He has a normal mood and affect. His behavior is normal.   Nursing note and vitals reviewed.    Ortho Exam   Extremity DVT signs are Negative on physical exam with negative Jessica sign, with no calf pain, with no palpable cords, with no increased pain with passive stretch/extension and with no skin tone change   Neurologic Exam     Mental Status   Oriented to person, place, and time.          Patient has tenderness with right hip abduction.     Assessment/Plan   Independent Review of Radiographic Studies:    No new imaging done today.      Procedures       Rory was seen today for post-op.    Diagnoses and all orders for this visit:    S/P hip replacement, right  -     Ambulatory Referral to Physical Therapy Evaluate and treat, POST OP, Ortho; Stretching, ROM, Strengthening       Discussion of orthopedic goals  Risk, benefits, and merits of treatment alternatives reviewed with the patient and questions answered  Physical therapy referral given  Ice, heat, and/or modalities as beneficial  Hip replacement precautions reviewed again    Recommendations/Plan:  Patient is encouraged to call or return for any issues or concerns.    FU in 8 weeks    Patient agreeable to call or return sooner for any concerns.             EMR Dragon-transcription disclaimer:  This encounter note is an electronic " transcription of spoken language to printed text.  Electronic transcription of spoken language may permit erroneous or at times nonsensical words or phrases to be inadvertently transcribed.  Although I have reviewed the note for such errors, some may still exist

## 2020-01-09 ENCOUNTER — TREATMENT (OUTPATIENT)
Dept: PHYSICAL THERAPY | Facility: CLINIC | Age: 60
End: 2020-01-09

## 2020-01-09 DIAGNOSIS — Z96.641 HISTORY OF HIP REPLACEMENT, TOTAL, RIGHT: Primary | ICD-10-CM

## 2020-01-09 PROCEDURE — 97161 PT EVAL LOW COMPLEX 20 MIN: CPT | Performed by: PHYSICAL THERAPIST

## 2020-01-09 PROCEDURE — 97110 THERAPEUTIC EXERCISES: CPT | Performed by: PHYSICAL THERAPIST

## 2020-01-09 NOTE — PROGRESS NOTES
Physical Therapy Initial Evaluation and Plan of Care      Patient: Rory Lal   : 1960  Diagnosis/ICD-10 Code:  History of hip replacement, total, right [Z96.641]  Referring practitioner: ANNA Ramsay*    Subjective Evaluation    History of Present Illness  Date of surgery: 2019  Mechanism of injury: Pt reports having having hip replacement on the R hip on 2019. Pt reports having difficulty getting out of chairs, squatting, bending hip up to put on shoes. Pt reports that he still needs his cane to decrease pain in the hip. Pt reports taking pressure off of the R hip helps relieve pain. Pt reports having home health for 2-3 weeks after surgery. Pt reports that he is doing much better but still hurting a lot.       Patient Occupation: Disability Pain  Current pain ratin  At best pain ratin  At worst pain ratin  Quality: dull ache and sharp  Aggravating factors: stairs, standing, squatting and ambulation  Progression: improved    Social Support  Lives in: multiple-level home  Lives with: spouse and adult children    Diagnostic Tests  X-ray: normal    Treatments  Previous treatment: physical therapy, home therapy and medication  Current treatment: medication  Patient Goals  Patient goals for therapy: decreased pain, increased strength and increased motion  Patient goal: Pt wants to be able to return to pain free walking and squatting           Objective       Neurological Testing     Sensation     Lumbar   Left   Intact: light touch    Right   Intact: light touch    Reflexes   Left   Patellar (L4): normal (2+)  Achilles (S1): normal (2+)    Right   Patellar (L4): normal (2+)  Achilles (S1): normal (2+)    Strength/Myotome Testing     Left Hip   Planes of Motion   Flexion: 4+    Right Hip   Planes of Motion   Flexion: 3+ (painful)    Left Knee   Flexion: 4  Extension: 4+  Quadriceps contraction: good    Right Knee   Flexion: 4  Extension: 4  Quadriceps contraction:  fair    Left Ankle/Foot   Dorsiflexion: 4+    Right Ankle/Foot   Dorsiflexion: 4+    Tests     Additional Tests Details  Pt with relief of pressure and pain with long axis distraction of R hip         Assessment & Plan     Assessment  Impairments: abnormal gait, abnormal muscle firing, abnormal or restricted ROM, activity intolerance, impaired physical strength, lacks appropriate home exercise program and pain with function  Assessment details: Pt is a 59 year old male with history of diabetes, back surgery, and hip trauma that presents to PT following R EDMAR. Pt with no neuro signs noted with exam. Pt with decreased strength and motion and pain expected following EDMAR. Pt has decreased quad contraction in the R quad. Pt with decreased gait speed, stride and step length with ambulation and requires straight cane although still has moderate antalgic gait. Pt would benefit from PT to help improve the above deficits and increase pain free daily activities.   Prognosis: good  Prognosis details: Short term Goals (2 weeks)   1. Pt will demonstrate independence with HEP  2. Pt will decreased resting pain to 3/10 pain to help improve sleep  3. Pt will present with R hip abduction strength to 4-/5.     Long Term Goals (6 weeks)  1. Pt will be able to ascend and descend 10 steps without pain in the R hip.   2. Pt will present to PT with R hip abduction strength at 4/5 to help improve balance and stability with gait.   3. Pt will be able to perform 5 sit to stands in standard chair without UE assist without pain in the R hip.   Functional Limitations: sleeping, walking, uncomfortable because of pain, sitting and standing  Plan  Therapy options: will be seen for skilled physical therapy services  Planned modality interventions: thermotherapy (hydrocollator packs) and cryotherapy  Planned therapy interventions: balance/weight-bearing training, fine motor coordination training, flexibility, functional ROM exercises, gait training,  home exercise program, joint mobilization, manual therapy, motor coordination training, neuromuscular re-education, strengthening, soft tissue mobilization, stretching and therapeutic activities  Frequency: 2x week  Treatment plan discussed with: patient  Plan details: Pt to be seen 2-3x per week for 6-8 weeks        Manual Therapy:         mins  90187;  Therapeutic Exercise:    12     mins  11937;     Neuromuscular Jennifer:        mins  04915;    Therapeutic Activity:          mins  27343;     Gait Training:           mins  54076;     Ultrasound:          mins  28614;    Electrical Stimulation:         mins  08956 ( );  Dry Needling          mins self-pay    Timed Treatment:   12   mins   Total Treatment:     36   mins    PT SIGNATURE: Ron Baugh PT   DATE TREATMENT INITIATED: 1/9/2020    Initial Certification  Certification Period: 4/8/2020  I certify that the therapy services are furnished while this patient is under my care.  The services outlined above are required by this patient, and will be reviewed every 90 days.     PHYSICIAN: Foreign Shirley PA-C      DATE:     Please sign and return via fax to [phone].. Thank you, [facility].

## 2020-01-14 ENCOUNTER — TREATMENT (OUTPATIENT)
Dept: PHYSICAL THERAPY | Facility: CLINIC | Age: 60
End: 2020-01-14

## 2020-01-14 DIAGNOSIS — Z96.641 HISTORY OF HIP REPLACEMENT, TOTAL, RIGHT: Primary | ICD-10-CM

## 2020-01-14 PROCEDURE — 97110 THERAPEUTIC EXERCISES: CPT | Performed by: PHYSICAL THERAPIST

## 2020-01-14 NOTE — PROGRESS NOTES
Physical Therapy Daily Progress Note        Rory Lal reports 4-5/10 pain today at rest.  Pt reports that his hip is doing better and is moving more although his hip and back continues to be sore and limiting function.          Objective Pt present to PT today with no distress at rest.     Pt with difficulty with SLR.     Pt with decreased pain in prone lying.       See Exercise, Manual, and Modality Logs for complete treatment.     Assessment/Plan  Pt continues to progress well with mobility and strengthening exercises for the R hip. Pt to continue with PT to help improve hip motion, strength, and stability to help increase functional mobility.       Progress per Plan of Care  Progress as tolerated           Manual Therapy:         mins  31168;  Therapeutic Exercise:    28     mins  73788;     Neuromuscular Jennifer:        mins  60204;    Therapeutic Activity:          mins  64166;     Gait Training:        ___  mins  52004;     Ultrasound:          mins  28597;    Electrical Stimulation:         mins  90244 ( );  Dry Needling          mins self-pay    Timed Treatment:   28   mins   Total Treatment:     37   mins    Ron Baugh, PT  Physical Therapist

## 2020-01-16 ENCOUNTER — TREATMENT (OUTPATIENT)
Dept: PHYSICAL THERAPY | Facility: CLINIC | Age: 60
End: 2020-01-16

## 2020-01-16 DIAGNOSIS — Z96.641 HISTORY OF HIP REPLACEMENT, TOTAL, RIGHT: Primary | ICD-10-CM

## 2020-01-16 PROCEDURE — 97140 MANUAL THERAPY 1/> REGIONS: CPT | Performed by: PHYSICAL THERAPIST

## 2020-01-16 PROCEDURE — 97110 THERAPEUTIC EXERCISES: CPT | Performed by: PHYSICAL THERAPIST

## 2020-01-16 NOTE — PROGRESS NOTES
Physical Therapy Daily Progress Note        Rory Lal reports 4/10 pain today at rest.  Pt reports that his R hip was sore after last session and that he has had to do a lot of yard work lately which has made him sore.         Objective Pt present to PT today with no distress at rest.     Pt able to advance to PWB activities well today without increased pain in the R hip.       See Exercise, Manual, and Modality Logs for complete treatment.     Assessment/Plan  Pt progressing strengthening exercises well and is tolerating PROM of hip well to help improve hip motion. Pt would benefit from PT to help improve hip strength, mobility, and activity tolerance.       Progress per Plan of Care  Progress as tolerated           Manual Therapy:    12     mins  09426;  Therapeutic Exercise:    36     mins  59270;     Neuromuscular Jennifer:        mins  67382;    Therapeutic Activity:          mins  71521;     Gait Training:        ___  mins  23207;     Ultrasound:          mins  53049;    Electrical Stimulation:         mins  95043 ( );  Dry Needling          mins self-pay    Timed Treatment:   48   mins   Total Treatment:     48   mins    Ron Baugh, PT  Physical Therapist

## 2020-01-21 ENCOUNTER — TREATMENT (OUTPATIENT)
Dept: PHYSICAL THERAPY | Facility: CLINIC | Age: 60
End: 2020-01-21

## 2020-01-21 DIAGNOSIS — Z96.641 HISTORY OF HIP REPLACEMENT, TOTAL, RIGHT: Primary | ICD-10-CM

## 2020-01-21 PROCEDURE — 97110 THERAPEUTIC EXERCISES: CPT | Performed by: PHYSICAL THERAPIST

## 2020-01-21 NOTE — PROGRESS NOTES
Physical Therapy Daily Progress Note         Rroy Lal reports 5/10 pain today at rest.  Pt reports that his R hip feels looser than last week and feels like he is moving better although the R hip is still tight.         Objective Pt present to PT today with no distress at rest.     Pt with restricted IR due to pain and tightness.     Pt with relief of R hip pain with distraction.       See Exercise, Manual, and Modality Logs for complete treatment.     Assessment/Plan  Pt continues to have tightness in the R hip and pain in the front of the hip. Pt is doing well with increased standing and WB activities. Pt would benefit from PT to help improve hip motion, function, and activity tolerance.       Progress per Plan of Care  Progress as tolerated           Manual Therapy:         mins  43499;  Therapeutic Exercise:    30     mins  83509;     Neuromuscular Jennifer:        mins  41221;    Therapeutic Activity:          mins  51633;     Gait Training:        ___  mins  85436;     Ultrasound:          mins  69326;    Electrical Stimulation:         mins  45201 ( );  Dry Needling          mins self-pay    Timed Treatment:   30   mins   Total Treatment:     40   mins    Ron Baugh, PT  Physical Therapist

## 2020-01-23 ENCOUNTER — TREATMENT (OUTPATIENT)
Dept: PHYSICAL THERAPY | Facility: CLINIC | Age: 60
End: 2020-01-23

## 2020-01-23 DIAGNOSIS — Z96.641 HISTORY OF HIP REPLACEMENT, TOTAL, RIGHT: Primary | ICD-10-CM

## 2020-01-23 PROCEDURE — 97110 THERAPEUTIC EXERCISES: CPT | Performed by: PHYSICAL THERAPIST

## 2020-01-23 NOTE — PROGRESS NOTES
Physical Therapy Daily Progress Note        Rory Lal reports 4/10 pain today at rest.  Pt reports that he was sore after last visit although he reports that weather as well as increased exercises contributed to his soreness. Pt reports that his hip feels unstable when he is on his feet sometimes.         Objective Pt present to PT today with no distress at rest.     Pt with pain with IR of the hip and tightness/pinching in the anterior hip with hip flexion.     Pt tolerated exercises well today without increased pain in the R hip with activities.     Pt with relief of pain and pressure with light R hip distraction.       See Exercise, Manual, and Modality Logs for complete treatment.     Assessment/Plan  Pt is improving hip motion and activity tolerance although is limited due to back pain from previous injury/surgery of lumbar spine. Pt would benefit from PT to help improve hip strength, function, and mobility to help return to pain free, independent ambulation and activities.       Progress per Plan of Care  Progress as tolerated           Manual Therapy:    7     mins  48458;  Therapeutic Exercise:    30     mins  53497;     Neuromuscular Jennifer:        mins  03613;    Therapeutic Activity:          mins  98663;     Gait Training:        ___  mins  95442;     Ultrasound:          mins  16627;    Electrical Stimulation:         mins  56334 ( );  Dry Needling          mins self-pay    Timed Treatment:   37   mins   Total Treatment:     44   mins    Ron Baugh, PT  Physical Therapist

## 2020-01-28 ENCOUNTER — TREATMENT (OUTPATIENT)
Dept: PHYSICAL THERAPY | Facility: CLINIC | Age: 60
End: 2020-01-28

## 2020-01-28 DIAGNOSIS — Z96.641 HISTORY OF HIP REPLACEMENT, TOTAL, RIGHT: Primary | ICD-10-CM

## 2020-01-28 PROCEDURE — 97110 THERAPEUTIC EXERCISES: CPT | Performed by: PHYSICAL THERAPIST

## 2020-01-30 ENCOUNTER — TREATMENT (OUTPATIENT)
Dept: PHYSICAL THERAPY | Facility: CLINIC | Age: 60
End: 2020-01-30

## 2020-01-30 DIAGNOSIS — Z96.641 HISTORY OF HIP REPLACEMENT, TOTAL, RIGHT: Primary | ICD-10-CM

## 2020-01-30 DIAGNOSIS — M25.551 PAIN OF RIGHT HIP JOINT: ICD-10-CM

## 2020-01-30 PROCEDURE — 97110 THERAPEUTIC EXERCISES: CPT | Performed by: PHYSICAL THERAPIST

## 2020-01-30 NOTE — PROGRESS NOTES
Physical Therapy Daily Progress Note        Rory Lal reports 4-5/10 pain today at rest.  Pt states that his R hip feels weak still although and is thinking that walking with a cane will help him.         Objective Pt present to PT today with no distress at rest.     Pt gait with less antalgia and greater symmetry when using straight cane.    Pt with relief from pressure and pain with distraction of R hip.       See Exercise, Manual, and Modality Logs for complete treatment.     Assessment/Plan  Pt continues to have weakness in the R hip and decreased motion although mobility is improving. Pt would benefit from PT to help hip strength, stability, and balance to help improve independence with daily activities and functional mobility.       Progress per Plan of Care  Progress as tolerated           Manual Therapy:         mins  63019;  Therapeutic Exercise:    29     mins  37884;     Neuromuscular Jennifer:        mins  57097;    Therapeutic Activity:          mins  57974;     Gait Training:        ___  mins  61061;     Ultrasound:          mins  48531;    Electrical Stimulation:         mins  74803 ( );  Dry Needling          mins self-pay    Timed Treatment:   29   mins   Total Treatment:     55   mins    Ron Baugh, PT  Physical Therapist

## 2020-01-31 NOTE — PROGRESS NOTES
Physical Therapy Daily Progress Note        Rory Lal reports 3-4/10 pain today at rest.  Pt states that he was sore after last visit and still has weakness and instability in the R hip. Pt states that he feels a huge difference walking with his cane although he forgot it today.         Objective Pt present to PT today with no distress at rest.     Pt able to tolerate exercises well today without increased pain in the R hip.     Pt with trendelenburg gait pattern in R hip without straight cane that nearly resolves with use of assistive device.       See Exercise, Manual, and Modality Logs for complete treatment.     Assessment/Plan  Pt continues to have evident weakness in the R hip and tightness limiting motion although activity tolerance is improving. Pt would benefit from PT to help increase R hip stability, strength, and functional mobility.       Progress per Plan of Care  Progress as tolerated           Manual Therapy:         mins  78342;  Therapeutic Exercise:    28     mins  17953;     Neuromuscular Jennifer:        mins  79081;    Therapeutic Activity:          mins  87758;     Gait Training:        ___  mins  94045;     Ultrasound:          mins  08019;    Electrical Stimulation:         mins  86766 ( );  Dry Needling          mins self-pay    Timed Treatment:   29   mins   Total Treatment:     54   mins    Ron Baugh, PT  Physical Therapist

## 2020-02-04 ENCOUNTER — TREATMENT (OUTPATIENT)
Dept: PHYSICAL THERAPY | Facility: CLINIC | Age: 60
End: 2020-02-04

## 2020-02-04 DIAGNOSIS — Z96.641 HISTORY OF HIP REPLACEMENT, TOTAL, RIGHT: Primary | ICD-10-CM

## 2020-02-04 PROCEDURE — 97110 THERAPEUTIC EXERCISES: CPT | Performed by: PHYSICAL THERAPIST

## 2020-02-06 NOTE — PROGRESS NOTES
Physical Therapy Daily Progress Note        Rory Lal reports 4-5/10 pain today at rest.  Pt reports that he continues to have soreness and pain in the R hip and the low back. Pt reports that his back is bothering him today and that he has some dental issues causing him pain so he may not be able to do as much as normal.         Objective Pt present to PT today with no distress at rest.     Pt able to perform decreased activities today without pain in the R hip.       See Exercise, Manual, and Modality Logs for complete treatment.     Assessment/Plan  Pt continues to have weakness and decreased mobility in the R hip. Pt to continue with PT to help improve the R hip stability, mobility, and function to return to pain free daily activities.       Progress per Plan of Care  Progress as tolerated           Manual Therapy:         mins  58831;  Therapeutic Exercise:    25     mins  66974;     Neuromuscular Jennifer:        mins  11545;    Therapeutic Activity:          mins  31843;     Gait Training:        ___  mins  02097;     Ultrasound:          mins  69425;    Electrical Stimulation:         mins  42665 ( );  Dry Needling          mins self-pay    Timed Treatment:   25   mins   Total Treatment:     53   mins    Ron Baugh, PT  Physical Therapist

## 2020-02-11 ENCOUNTER — TREATMENT (OUTPATIENT)
Dept: PHYSICAL THERAPY | Facility: CLINIC | Age: 60
End: 2020-02-11

## 2020-02-11 DIAGNOSIS — Z96.641 HISTORY OF HIP REPLACEMENT, TOTAL, RIGHT: Primary | ICD-10-CM

## 2020-02-11 PROCEDURE — 97110 THERAPEUTIC EXERCISES: CPT | Performed by: PHYSICAL THERAPIST

## 2020-02-11 NOTE — PROGRESS NOTES
Physical Therapy Daily Progress Note        Rory Lal reports 3/10 pain today at rest.  Pt reports that he took it easy this weekend and his hip feels like it is doing much better. Pt reports that his R hip feels more stable and stronger.         Objective Pt present to PT today with no distress at rest.     Pt with pain with passive IR of the R hip.     Pt with improved gait pattern without assistive device.       See Exercise, Manual, and Modality Logs for complete treatment.     Assessment/Plan  Pt continues to have decreased motion in the R hip although is improving stability and strength with PT activities. Pt would benefit from PT to help improve R hip mobility, function, and activity tolerance to improve pain free daily function.       Progress per Plan of Care  Progress as tolerated           Manual Therapy:    4     mins  44286;  Therapeutic Exercise:    24     mins  84223;     Neuromuscular Jennifer:        mins  25656;    Therapeutic Activity:          mins  93307;     Gait Training:        ___  mins  55076;     Ultrasound:          mins  32149;    Electrical Stimulation:         mins  05821 ( );  Dry Needling          mins self-pay    Timed Treatment:   28   mins   Total Treatment:     47   mins    Ron Baugh, PT  Physical Therapist

## 2020-02-18 ENCOUNTER — TREATMENT (OUTPATIENT)
Dept: PHYSICAL THERAPY | Facility: CLINIC | Age: 60
End: 2020-02-18

## 2020-02-18 DIAGNOSIS — Z96.641 HISTORY OF HIP REPLACEMENT, TOTAL, RIGHT: Primary | ICD-10-CM

## 2020-02-18 DIAGNOSIS — M25.551 PAIN OF RIGHT HIP JOINT: ICD-10-CM

## 2020-02-18 PROCEDURE — 97112 NEUROMUSCULAR REEDUCATION: CPT | Performed by: PHYSICAL THERAPIST

## 2020-02-18 PROCEDURE — 97110 THERAPEUTIC EXERCISES: CPT | Performed by: PHYSICAL THERAPIST

## 2020-02-18 NOTE — PROGRESS NOTES
Physical Therapy Daily Progress Note        Rory Lal reports 9/10 pain today at rest.  Pt reports that he has spent most of the day messing with and digging around his septic system which has made the hip and back hurt a lot.         Objective Pt present to PT today with increased antalgic gait today with straight cane.     Pt with improve gait pattern following stretching and mobilization of the R hip manually.     Pt tolerated activities well today feeling better after finishing session compared to when he arrived. WB and standing exercises were held today.       See Exercise, Manual, and Modality Logs for complete treatment.     Assessment/Plan  Pt continues to do a lot of heavy work at the house despite encouragement to avoid it and allow hip to heal. Pt to continue with PT to help improve hip motion, strength, and activity tolerance.       Progress per Plan of Care  Progress as tolerated           Manual Therapy:    11     mins  94945;  Therapeutic Exercise:    30     mins  97681;     Neuromuscular Jennifer:    9    mins  26423;    Therapeutic Activity:          mins  05016;     Gait Training:        ___  mins  12559;     Ultrasound:          mins  11708;    Electrical Stimulation:         mins  91722 ( );  Dry Needling          mins self-pay    Timed Treatment:   50   mins   Total Treatment:     50   mins    Ron Baugh, PT  Physical Therapist

## 2020-02-21 ENCOUNTER — OFFICE VISIT (OUTPATIENT)
Dept: ORTHOPEDIC SURGERY | Facility: CLINIC | Age: 60
End: 2020-02-21

## 2020-02-21 VITALS — WEIGHT: 193 LBS | RESPIRATION RATE: 18 BRPM | BODY MASS INDEX: 29.25 KG/M2 | HEIGHT: 68 IN

## 2020-02-21 DIAGNOSIS — Z96.641 S/P HIP REPLACEMENT, RIGHT: Primary | ICD-10-CM

## 2020-02-21 PROCEDURE — 99213 OFFICE O/P EST LOW 20 MIN: CPT | Performed by: PHYSICIAN ASSISTANT

## 2020-02-21 NOTE — PROGRESS NOTES
"Subjective   Patient ID: Rory Lal is a 59 y.o.  male  Follow-up of the Right Hip (Patient here today to follow up after right EDMAR on 11/12/19. He states his hip feels weak and still having some pain but has improved since last visit. )         History of Present Illness    Patient is following up with a scheduled appointment in regards to continued right hip arthralgia.  Patient had right total hip arthroplasty 11/12/2019.  States he is still enrolled in physical therapy with [facility].  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  He denies having numbness or tingling to the lower extremity.  He states he has pain when he tries to raise the right hip upward.    Past Medical History:   Diagnosis Date   • Arthritis    • Chronic left-sided low back pain    • Chronic narcotic use    • Chronic right hip pain    • History of chest pain 11/07/2019    Patient denies, noted by Dr. Gil on 2/5/2015   • History of MRSA infection 2015    Per pt, Left foot; treated with antibiotics and no reoccurence    • History of palpitations 11/07/2019    Patient denies, noted by Dr. Gil on 2/5/2015   • Hx of colonic polyp    • Hyperlipidemia    • Hypertension     Patient denies, noted by Dr. Gil 2/5/2015   • Impaired functional mobility, balance, gait, and endurance    • Impaired gait     R/T right hip pain    • Insomnia    • Lumbosacral disc disease    • Muscle spasm    • Neuropathy    • Non compliance w medication regimen     Patient reported \"I should take cholesterol medicine but I don't.\"    • Prehypertension    • RSD (reflex sympathetic dystrophy) 02/05/2015    MD note    • Smokeless tobacco use    • Use of cane as ambulatory aid    • Wears glasses     reading        Past Surgical History:   Procedure Laterality Date   • BACK SURGERY  2004    S1-L5 fusion   • COLONOSCOPY     • DIGIT REATTACHMENT Left 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;  Service: Orthopedics       No family history on file.    Social History     Socioeconomic History   • Marital status:      Spouse name: Not on file   • Number of children: Not on file   • Years of education: Not on file   • Highest education level: Not on file   Occupational History     Employer: DISABLED   Tobacco Use   • Smoking status: Former Smoker     Types: Cigarettes   • Smokeless tobacco: Current User     Types: Snuff   • Tobacco comment: Patient reports only used briefly   Substance and Sexual Activity   • Alcohol use: No     Frequency: Never   • Drug use: No   • Sexual activity: Defer         Current Outpatient Medications:   •  acetaminophen (TYLENOL) 325 MG tablet, Take 650 mg by mouth Every 6 (Six) Hours As Needed., Disp: , Rfl:   •  atorvastatin (LIPITOR) 10 MG tablet, Take 10 mg by mouth Every Night., Disp: , Rfl:   •  gabapentin (NEURONTIN) 100 MG capsule, Take 200 mg by mouth At Night As Needed., Disp: , Rfl:   •  oxyCODONE (ROXICODONE) 15 MG immediate release tablet, Take 15 mg by mouth Every 6 (Six) Hours As Needed for Moderate Pain ., Disp: , Rfl:   •  oxyCODONE-acetaminophen (PERCOCET) 7.5-325 MG per tablet, Take 1 tablet by mouth At Night As Needed for breakthrough pain, Disp: 7 tablet, Rfl: 0  •  tiZANidine (ZANAFLEX) 4 MG tablet, Take 1 tablet by mouth At Night As Needed for Muscle Spasms., Disp: 14 tablet, Rfl: 0    Allergies   Allergen Reactions   • Pollen Extract Other (See Comments)     Watery eyes       Review of Systems   Constitutional: Negative for fever.   HENT: Negative for dental problem and voice change.    Eyes: Negative for visual disturbance.   Respiratory: Negative for shortness of breath.    Cardiovascular: Negative for chest pain.   Gastrointestinal: Negative  "for abdominal pain.   Genitourinary: Negative for dysuria.   Musculoskeletal: Positive for arthralgias and gait problem. Negative for joint swelling.   Skin: Negative for rash.   Neurological: Negative for speech difficulty.   Hematological: Does not bruise/bleed easily.   Psychiatric/Behavioral: Negative for confusion.       I have reviewed the medical and surgical history, family history, social history, medications, and/or allergies, and the review of systems of this report.    Objective   Resp 18   Ht 171.5 cm (67.5\")   Wt 87.5 kg (193 lb)   BMI 29.78 kg/m²    Physical Exam   Constitutional: He is oriented to person, place, and time. He appears well-developed.   Neck: No tracheal deviation present.   Pulmonary/Chest: Effort normal.   Musculoskeletal:        Right hip: He exhibits no bony tenderness and no crepitus.        Lumbar back: He exhibits tenderness.   Neurological: He is alert and oriented to person, place, and time.   Psychiatric: He has a normal mood and affect.   Nursing note and vitals reviewed.    Right Hip Exam     Tenderness   The patient is experiencing tenderness in the posterior.    Range of Motion   Abduction: 30   Flexion: 90     Muscle Strength   Abduction: 5/5   Adduction: 4/5   Flexion: 4/5     Other   Sensation: normal  Pulse: present           Extremity DVT signs are negative on physical exam with negative Jessica sign, no calf pain, no palpable cords and no skin tone change   Neurologic Exam     Mental Status   Oriented to person, place, and time.              Assessment/Plan   Independent Review of Radiographic Studies:    AP and lateral of the right hip, indication to evaluate status of prosthesis and joint, and compared with prior imaging, shows no acute fracture or dislocation. Good position and alignment of prosthesis with no radiographic signs of loosening.       Sandy Valadez was seen today for follow-up.    Diagnoses and all orders for this visit:    S/P hip " replacement, right  -     XR Hip With or Without Pelvis 2 - 3 View Right; Future       Orthopedic activities reviewed and patient expressed appreciation  Discussion of orthopedic goals  Risk, benefits, and merits of treatment alternatives reviewed with the patient and questions answered    Recommendations/Plan:  Patient is encouraged to call or return for any issues or concerns.    I would like for the patient to be evaluated by his neurosurgeon Dr. Prince given he is experiencing lumbar pain and he has a prior MRI from August thousand 19 which reveals L4-L5 severe facet arthropathy with circumferential bulging disc..    Continue PT for right hip-his hip arthralgia may need the 6 months to completely heal and adjust to a right total hip arthroplasty.  He could not have a three-phase bone scan until his 9-month after surgery due to the potential for a false positive on the bone scan  Patient agreeable to call or return sooner for any concerns.           EMR Dragon-transcription disclaimer:  This encounter note is an electronic transcription of spoken language to printed text.  Electronic transcription of spoken language may permit erroneous or at times nonsensical words or phrases to be inadvertently transcribed.  Although I have reviewed the note for such errors, some may still exist

## 2020-02-24 ENCOUNTER — TREATMENT (OUTPATIENT)
Dept: PHYSICAL THERAPY | Facility: CLINIC | Age: 60
End: 2020-02-24

## 2020-02-24 DIAGNOSIS — Z96.641 HISTORY OF HIP REPLACEMENT, TOTAL, RIGHT: Primary | ICD-10-CM

## 2020-02-24 PROCEDURE — 97140 MANUAL THERAPY 1/> REGIONS: CPT | Performed by: PHYSICAL THERAPIST

## 2020-02-24 PROCEDURE — 97530 THERAPEUTIC ACTIVITIES: CPT | Performed by: PHYSICAL THERAPIST

## 2020-02-24 NOTE — PROGRESS NOTES
Physical Therapy Daily Progress Note        Rory Lal reports 4-5/10 pain today at rest.  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 Pt states that he went to see his surgeon and the surgeon wants him to take it easy as much as possible.         Objective       Passive Range of Motion     Right Hip   Flexion: 97 degrees   Abduction: 32 degrees   External rotation (90/90): 50 degrees   Internal rotation (90/90): 14 degrees    Pt present to PT today with no distress at rest.     Pt with pain limited IR of the hip.     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     Date of Surgery: 11/12/19  Date of Initial Eval: 1/9/20  Date of Data Gathered: 2/24/20    LEFS: 12/80    Short term Goals (2 weeks)   1. Pt will demonstrate independence with HEP (met)  2. Pt will decreased resting pain to 3/10 pain to help improve sleep (met)  3. Pt will present with R hip abduction strength to 4-/5. (in progress)    Long Term Goals (6 weeks)  1. Pt will be able to ascend and descend 10 steps without pain in the R hip. (in progress)  2. Pt will present to PT with R hip abduction strength at 4/5 to help improve balance and stability with gait. (in progress)  3. Pt will be able to perform 5 sit to stands in standard chair without UE assist without pain in the R hip. (in progress)        See Exercise, Manual, and Modality Logs for complete treatment.     Assessment/Plan  Pt continues to have a lot of pain in the hip due to all the other activities he has been doing at his house. Pt with weakness in the hip abductors. Pt would benefit from PT to help increase hip motion, strength, and stability to help return to prior level of function and pain free activities.       Progress per Plan of Care  Await re-cert           Manual Therapy:    16     mins   06151;  Therapeutic Exercise:         mins  74659;     Neuromuscular Jennfier:        mins  83363;    Therapeutic Activity:     12     mins  23869;     Gait Training:        ___  mins  52874;     Ultrasound:          mins  99572;    Electrical Stimulation:         mins  00923 ( );  Dry Needling          mins self-pay    Timed Treatment:   28   mins   Total Treatment:     47   mins    Ron Baugh, PT  Physical Therapist

## 2020-03-16 ENCOUNTER — TREATMENT (OUTPATIENT)
Dept: PHYSICAL THERAPY | Facility: CLINIC | Age: 60
End: 2020-03-16

## 2020-03-16 DIAGNOSIS — Z96.641 HISTORY OF HIP REPLACEMENT, TOTAL, RIGHT: Primary | ICD-10-CM

## 2020-03-16 PROCEDURE — 97110 THERAPEUTIC EXERCISES: CPT | Performed by: PHYSICAL THERAPIST

## 2020-03-16 PROCEDURE — 97140 MANUAL THERAPY 1/> REGIONS: CPT | Performed by: PHYSICAL THERAPIST

## 2020-03-16 NOTE — PROGRESS NOTES
Physical Therapy Daily Progress Note        Rory Lal reports 4-5/10 pain today at rest.  Pt reports that he feels almost normal walking with his straight cane. Pt reports that he has stayed active while he was out with illness.         Objective Pt present to PT today with no distress at rest.     Pt hip flexion still limited in the R hip and IR which is painful.     Pt requires straight cane to help improve symmetrical gait.       See Exercise, Manual, and Modality Logs for complete treatment.     Assessment/Plan  Pt continues to have limited motion and activity tolerance in the R hip. Pt has weakness in the R hip causing decreased stability with gait and WB activities. Pt to continue with PT to help increase R hip stability, activity tolerance, and pain free function.       Progress per Plan of Care  Progress as tolerated           Manual Therapy:    10     mins  84252;  Therapeutic Exercise:    17     mins  06369;     Neuromuscular Jennifer:        mins  62796;    Therapeutic Activity:          mins  58060;     Gait Training:        ___  mins  84071;     Ultrasound:          mins  23798;    Electrical Stimulation:         mins  83104 ( );  Dry Needling          mins self-pay    Timed Treatment:   27   mins   Total Treatment:     52   mins    Ron Baugh, PT  Physical Therapist

## 2020-05-26 ENCOUNTER — OFFICE VISIT (OUTPATIENT)
Dept: ORTHOPEDIC SURGERY | Facility: CLINIC | Age: 60
End: 2020-05-26

## 2020-05-26 VITALS
RESPIRATION RATE: 18 BRPM | BODY MASS INDEX: 29.4 KG/M2 | HEART RATE: 74 BPM | WEIGHT: 194 LBS | SYSTOLIC BLOOD PRESSURE: 160 MMHG | HEIGHT: 68 IN | DIASTOLIC BLOOD PRESSURE: 108 MMHG

## 2020-05-26 DIAGNOSIS — Z96.641 S/P HIP REPLACEMENT, RIGHT: Primary | ICD-10-CM

## 2020-05-26 DIAGNOSIS — Z98.890 HISTORY OF LUMBAR SURGERY: ICD-10-CM

## 2020-05-26 DIAGNOSIS — R03.0 ELEVATED BLOOD PRESSURE READING IN OFFICE WITHOUT DIAGNOSIS OF HYPERTENSION: ICD-10-CM

## 2020-05-26 DIAGNOSIS — R29.898 WEAKNESS OF RIGHT LEG: ICD-10-CM

## 2020-05-26 DIAGNOSIS — M51.36 DDD (DEGENERATIVE DISC DISEASE), LUMBAR: ICD-10-CM

## 2020-05-26 PROCEDURE — 99214 OFFICE O/P EST MOD 30 MIN: CPT | Performed by: PHYSICIAN ASSISTANT

## 2020-05-26 NOTE — PROGRESS NOTES
"Subjective   Patient ID: Rory Lal is a 60 y.o. male  Follow-up of the Right Hip (total hip arthroplasty 11/12/19)         History of Present Illness       Patient is following up at a scheduled appointment regarding his right total hip arthroplasty.  Patient states his right hip is doing well.  He notices that his low back is causing an aching pain.  Patient states he does have chronic low back pain however, this pain is worsening.  He does notice occasional right lower extremity numbness, tingling and weakness.  He states on occasion he will experience testicular numbness and tingling.  There has been no bowel or bladder incontinence.        Pain Score: 7  Pain Location: Hip  Pain Orientation: Right        Pain Frequency: Intermittent                               Past Medical History:   Diagnosis Date   • Arthritis    • Chronic left-sided low back pain    • Chronic narcotic use    • Chronic right hip pain    • History of chest pain 11/07/2019    Patient denies, noted by Dr. Gil on 2/5/2015   • History of MRSA infection 2015    Per pt, Left foot; treated with antibiotics and no reoccurence    • History of palpitations 11/07/2019    Patient denies, noted by Dr. Gil on 2/5/2015   • Hx of colonic polyp    • Hyperlipidemia    • Hypertension     Patient denies, noted by Dr. Gil 2/5/2015   • Impaired functional mobility, balance, gait, and endurance    • Impaired gait     R/T right hip pain    • Insomnia    • Lumbosacral disc disease    • Muscle spasm    • Neuropathy    • Non compliance w medication regimen     Patient reported \"I should take cholesterol medicine but I don't.\"    • Prehypertension    • RSD (reflex sympathetic dystrophy) 02/05/2015    MD note    • Smokeless tobacco use    • Use of cane as ambulatory aid    • Wears glasses     reading        Past Surgical History:   Procedure Laterality Date   • BACK SURGERY  2004    S1-L5 fusion   • COLONOSCOPY     • DIGIT REATTACHMENT Left 1987    " Injury from construction work     • HERNIA REPAIR Right 1995    Inguinal    • TOTAL HIP ARTHROPLASTY Right 11/12/2019    Procedure: total hip arthroplasty, right;  Surgeon: Syd Ashley MD;  Location: [facility];  Service: Orthopedics       History reviewed. No pertinent family history.    Social History     Socioeconomic History   • Marital status:      Spouse name: Not on file   • Number of children: Not on file   • Years of education: Not on file   • Highest education level: Not on file   Occupational History     Employer: DISABLED   Tobacco Use   • Smoking status: Former Smoker     Types: Cigarettes   • Smokeless tobacco: Current User     Types: Snuff   • Tobacco comment: Patient reports only used briefly   Substance and Sexual Activity   • Alcohol use: No     Frequency: Never   • Drug use: No   • Sexual activity: Defer         Current Outpatient Medications:   •  acetaminophen (TYLENOL) 325 MG tablet, Take 650 mg by mouth Every 6 (Six) Hours As Needed., Disp: , Rfl:   •  gabapentin (NEURONTIN) 100 MG capsule, Take 200 mg by mouth At Night As Needed., Disp: , Rfl:   •  oxyCODONE (ROXICODONE) 15 MG immediate release tablet, Take 15 mg by mouth Every 6 (Six) Hours As Needed for Moderate Pain ., Disp: , Rfl:     Allergies   Allergen Reactions   • Pollen Extract Other (See Comments)     Watery eyes       Review of Systems   Constitutional: Negative for diaphoresis, fever and unexpected weight change.   HENT: Negative for dental problem and sore throat.    Eyes: Negative for visual disturbance.   Respiratory: Negative for shortness of breath.    Cardiovascular: Negative for chest pain.   Gastrointestinal: Negative for abdominal pain, constipation, diarrhea, nausea and vomiting.   Genitourinary: Negative for difficulty urinating and frequency.   Musculoskeletal: Positive for arthralgias and back pain.   Neurological: Negative for headaches.   Hematological: Does not bruise/bleed easily.       I have  "reviewed the medical and surgical history, family history, social history, medications, and/or allergies, and the review of systems of this report.    Objective   BP (!) 160/108 (BP Location: Right arm, Patient Position: Sitting, Cuff Size: Adult)   Pulse 74   Resp 18   Ht 171.5 cm (67.5\")   Wt 88 kg (194 lb)   BMI 29.94 kg/m²    Physical Exam   Constitutional: He is oriented to person, place, and time. He appears well-developed and well-nourished.   Eyes: Conjunctivae are normal.   Pulmonary/Chest: Effort normal. No respiratory distress.   Musculoskeletal:        Right hip: He exhibits decreased strength. He exhibits normal range of motion and no deformity.        Lumbar back: He exhibits decreased range of motion, tenderness, pain and spasm.   Neurological: He is alert and oriented to person, place, and time.   Psychiatric: He has a normal mood and affect. His behavior is normal.   Nursing note and vitals reviewed.    Right Hip Exam     Range of Motion   Abduction: 35   Right hip flexion: 85.     Muscle Strength   Abduction: 4/5   Adduction: 4/5   Flexion: 4/5     Tests   JAIME: negative  Fadir:  Negative FADIR test    Other   Sensation: normal      Back Exam     Tenderness   The patient is experiencing tenderness in the lumbar and sacroiliac.           Extremity DVT signs are negative on physical exam with negative Jessica sign, no calf pain, no palpable cords and no skin tone change   Neurologic Exam     Mental Status   Oriented to person, place, and time.        There is no anatalgic or trendelenburg gait disturbance.   There is a noted limp to the right lower extremity with his lumbar spine and flexion/ ( he states his back feels relief when walking in this position)    Assessment/Plan   Independent Review of Radiographic Studies:    AP and lateral of the right hip, indication to evaluate status of prosthesis and joint, and compared with prior imaging, shows no acute fracture or dislocation. Good position " and alignment of prosthesis with no radiographic signs of loosening.       Procedures       Rory was seen today for follow-up.    Diagnoses and all orders for this visit:    S/P hip replacement, right  -     XR Hip With or Without Pelvis 2 - 3 View Right; Future    DDD (degenerative disc disease), lumbar  -     Ambulatory Referral to Neurosurgery    History of lumbar surgery  -     Ambulatory Referral to Neurosurgery    Weakness of right leg  -     Ambulatory Referral to Neurosurgery    Elevated blood pressure reading in office without diagnosis of hypertension       Ice, heat, and/or modalities as beneficial    Recommendations/Plan:  Patient informed me just before leaving the office that his blood pressure has been elevated at home for the last several weeks.  He states the average reading is 170s over 90s.  He denies at any point or currently having chest pain, neck pain or shortness of breath.  He also denies having any headache or neurological deficits.  His BP today is 160/108.  I suggested that due to that reading he should visit the local emergency room.  However, he politely declined.  He states he will follow-up with his PCP.  He is advised to report to the nearest emergency room should he develop any unusual symptoms such as but not limited to, chest pain, headache, blurred vision, complete paralysis to one side of the body.    I did contact his PCP Elvi Rodriguez and discussed his abnormal blood pressure findings as well as recent history of elevated blood pressure readings.  She agrees to see the patient within 1 day.  She states she will contact the patient this evening to initiate blood pressure medication    Exercise, medications, injections, other patient advice, and return appointment as noted.  Patient is encouraged to call or return for any issues or concerns.  I explained to the patient his right hip radiographic images do not reveal a source for his pain.  I would like for him to be evaluated  by his neurosurgeon-Dr. Prince  Patient agreeable to call or return sooner for any concerns.    I did spend approximately 25-30 minutes examining the patient in contacting his PCP           EMR Dragon-transcription disclaimer:  This encounter note is an electronic transcription of spoken language to printed text.  Electronic transcription of spoken language may permit erroneous or at times nonsensical words or phrases to be inadvertently transcribed.  Although I have reviewed the note for such errors, some may still exist

## 2020-06-11 ENCOUNTER — TELEPHONE (OUTPATIENT)
Dept: NEUROSURGERY | Facility: CLINIC | Age: 60
End: 2020-06-11

## 2020-06-11 DIAGNOSIS — Z98.1 HISTORY OF FUSION OF LUMBAR SPINE: Primary | ICD-10-CM

## 2020-06-11 NOTE — TELEPHONE ENCOUNTER
Please have him FU with dr. Prince with a new MRI of lumbar spine with and without contrast     Order placed in chart.    Marilin, is there anything else you want ordered?

## 2020-06-15 NOTE — TELEPHONE ENCOUNTER
MRI scheduled in [place] at [facility] per patient request. First available is 6/26/20. He is scheduled for 5:00pm that day. They gave specific instructions for MRI dept - go to [facility], call [phone] to get registered, and they will call once it is time for appointment (when you can enter the building).     Follow up is scheduled for 6/29/20 @ 11:00am with Dr. Prince.     Appt dates and times were provided to patient's spouse as well as mailed.

## 2020-06-26 ENCOUNTER — HOSPITAL ENCOUNTER (OUTPATIENT)
Dept: MRI IMAGING | Facility: HOSPITAL | Age: 60
Discharge: HOME | End: 2020-06-26
Admitting: PHYSICIAN ASSISTANT

## 2020-06-26 DIAGNOSIS — Z98.1 HISTORY OF FUSION OF LUMBAR SPINE: ICD-10-CM

## 2020-06-26 PROCEDURE — 72158 MRI LUMBAR SPINE W/O & W/DYE: CPT

## 2020-06-26 PROCEDURE — A9577 INJ MULTIHANCE: HCPCS | Performed by: PHYSICIAN ASSISTANT

## 2020-06-26 PROCEDURE — 0 GADOBENATE DIMEGLUMINE 529 MG/ML SOLUTION: Performed by: PHYSICIAN ASSISTANT

## 2020-06-26 RX ADMIN — GADOBENATE DIMEGLUMINE 15 ML: 529 INJECTION, SOLUTION INTRAVENOUS at 18:31

## 2020-06-29 ENCOUNTER — OFFICE VISIT (OUTPATIENT)
Dept: NEUROSURGERY | Facility: CLINIC | Age: 60
End: 2020-06-29

## 2020-06-29 VITALS
HEIGHT: 68 IN | SYSTOLIC BLOOD PRESSURE: 154 MMHG | TEMPERATURE: 98.4 F | BODY MASS INDEX: 28.95 KG/M2 | WEIGHT: 191 LBS | DIASTOLIC BLOOD PRESSURE: 92 MMHG

## 2020-06-29 DIAGNOSIS — R29.898 RIGHT LEG WEAKNESS: ICD-10-CM

## 2020-06-29 DIAGNOSIS — M25.551 RIGHT HIP PAIN: Primary | ICD-10-CM

## 2020-06-29 DIAGNOSIS — M47.816 SPONDYLOSIS WITHOUT MYELOPATHY OR RADICULOPATHY, LUMBAR REGION: ICD-10-CM

## 2020-06-29 DIAGNOSIS — G60.9 HEREDITARY AND IDIOPATHIC PERIPHERAL NEUROPATHY: ICD-10-CM

## 2020-06-29 DIAGNOSIS — Z98.1 HISTORY OF FUSION OF LUMBAR SPINE: ICD-10-CM

## 2020-06-29 PROCEDURE — 99213 OFFICE O/P EST LOW 20 MIN: CPT | Performed by: NEUROLOGICAL SURGERY

## 2020-06-29 RX ORDER — NABUMETONE 750 MG/1
750 TABLET, FILM COATED ORAL 2 TIMES DAILY
Qty: 60 TABLET | Refills: 0 | Status: SHIPPED | OUTPATIENT
Start: 2020-06-29 | End: 2021-06-21

## 2020-06-29 NOTE — PROGRESS NOTES
"Rory Gardner Lukasz  1960  [id number]      Chief Complaint   Patient presents with   • Back Pain   • Hip Pain       HISTORY OF PRESENT ILLNESS: This is a 60-year-old male who previously had PLIF L5-S1.  He had a fracture of his hip which was repaired approximately 1 year ago.  Although he is improved he still has symptoms.  This includes hyperesthesia in the testicle; anterior thigh on the right.  He also has issues with impaired balance, gait and endurance.  He has a neuropathy in his left lower extremity.  Lumbar MRIs been repeated and is referred for neurosurgical consultation.    Past Medical History:   Diagnosis Date   • Arthritis    • Chronic left-sided low back pain    • Chronic narcotic use    • Chronic right hip pain    • History of chest pain 11/07/2019    Patient denies, noted by Dr. Gil on 2/5/2015   • History of MRSA infection 2015    Per pt, Left foot; treated with antibiotics and no reoccurence    • History of palpitations 11/07/2019    Patient denies, noted by Dr. Gil on 2/5/2015   • Hx of colonic polyp    • Hyperlipidemia    • Hypertension     Patient denies, noted by Dr. Gil 2/5/2015   • Impaired functional mobility, balance, gait, and endurance    • Impaired gait     R/T right hip pain    • Insomnia    • Lumbosacral disc disease    • Muscle spasm    • Neuropathy    • Non compliance w medication regimen     Patient reported \"I should take cholesterol medicine but I don't.\"    • Prehypertension    • RSD (reflex sympathetic dystrophy) 02/05/2015    MD note    • Smokeless tobacco use    • Use of cane as ambulatory aid    • Wears glasses     reading       Past Surgical History:   Procedure Laterality Date   • BACK SURGERY  2004    S1-L5 fusion   • COLONOSCOPY     • 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    Social History     Socioeconomic History   • Marital status:      Spouse name: Not on file   • Number of children: Not on file   • Years of education: Not on file   • Highest education level: Not on file   Occupational History     Employer: DISABLED   Tobacco Use   • Smoking status: Former Smoker     Types: Cigarettes   • Smokeless tobacco: Current User     Types: Snuff   • Tobacco comment: Patient reports only used briefly   Substance and Sexual Activity   • Alcohol use: No     Frequency: Never   • Drug use: No   • Sexual activity: Defer       Allergies   Allergen Reactions   • Pollen Extract Other (See Comments)     Watery eyes         Current Outpatient Medications:   •  acetaminophen (TYLENOL) 325 MG tablet, Take 650 mg by mouth Every 6 (Six) Hours As Needed., Disp: , Rfl:   •  gabapentin (NEURONTIN) 100 MG capsule, Take 200 mg by mouth At Night As Needed., Disp: , Rfl:   •  oxyCODONE (ROXICODONE) 15 MG immediate release tablet, Take 15 mg by mouth Every 6 (Six) Hours As Needed for Moderate Pain ., Disp: , Rfl:     Review of Systems   Constitutional: Negative for activity change, appetite change, chills, diaphoresis, fatigue, fever and unexpected weight change.   HENT: Negative for congestion, dental problem, drooling, ear discharge, ear pain, facial swelling, hearing loss, mouth sores, nosebleeds, postnasal drip, rhinorrhea, sinus pressure, sneezing, sore throat, tinnitus, trouble swallowing and voice change.    Eyes: Negative for photophobia, pain, discharge, redness, itching and visual disturbance.   Respiratory: Negative for apnea, cough, choking, chest tightness, shortness of breath, wheezing and stridor.    Cardiovascular: Negative for chest pain, palpitations and leg swelling.   Gastrointestinal: Negative for abdominal distention, abdominal pain, anal bleeding, blood in stool, constipation, diarrhea, nausea, rectal  "pain and vomiting.   Endocrine: Negative for cold intolerance, heat intolerance, polydipsia, polyphagia and polyuria.   Genitourinary: Negative for decreased urine volume, difficulty urinating, dysuria, enuresis, flank pain, frequency, genital sores, hematuria and urgency.   Musculoskeletal: Positive for arthralgias. Negative for back pain, gait problem, joint swelling, myalgias, neck pain and neck stiffness.   Skin: Negative for color change, pallor, rash and wound.   Allergic/Immunologic: Negative for environmental allergies, food allergies and immunocompromised state.   Neurological: Positive for weakness. Negative for dizziness, tremors, seizures, syncope, facial asymmetry, speech difficulty, light-headedness, numbness and headaches.   Hematological: Negative for adenopathy. Does not bruise/bleed easily.   Psychiatric/Behavioral: Negative for agitation, behavioral problems, confusion, decreased concentration, dysphoric mood, hallucinations, self-injury, sleep disturbance and suicidal ideas. The patient is not nervous/anxious and is not hyperactive.    All other systems reviewed and are negative.      Vitals:    06/29/20 1054   BP: 154/92   BP Location: Right arm   Patient Position: Sitting   Temp: 98.4 °F (36.9 °C)   TempSrc: Temporal   Weight: 86.6 kg (191 lb)   Height: 171.5 cm (67.5\")       Neurological Examination:      Mental status/speech: The patient is alert and oriented.  Speech is clear without aphysia or dysarthria.  No overt cognitive deficits.    Cranial nerve examination:    Olfaction: Smell is intact.  Vision: Vision is intact without visual field abnormalities.  Funduscopic examination is normal.  No pupillary irregularity.  Ocular motor examination: The extraocular muscles are intact.  There is no diplopia.  The pupil is round and reactive to both light and accommodation.  There is no nystagmus.  Facial movement/sensation: There is no facial weakness.  Sensation is intact in the first, second, " and third divisions of the trigeminal nerve.  The corneal reflex is intact.  Auditory: Hearing is intact to finger rub bilaterally.  Cranial nerves IX, X, XI, XII: Phonation is normal.  No dysphagia.  Tongue is protruded in the midline without atrophy.  The gag reflex is intact.  Shoulder shrug is normal.    Musculoligamentous ligamentous examination: Limited range of motion lumbar spine.  Pain with internal and external rotation of his right hip.  The deep tendon reflexes are symmetrically preserved although they are hypoactive.  Straight leg raising, Lasègue and flip test negative.  He has no weakness in his lower extremity.  His gait is normal.        Medical Decision Making:     Diagnostic Data Set: The lumbar MRI shows postsurgical changing at L5-S1.  He has mild degenerative disc disease at L4-L5.      Assessment: Peripheral neuropathy, s/p PLIF L5-S1; s/p hip replacement to right          Recommendations: I have ordered EMG and NCV for completeness.  I also think he would be an ideal candidate for dorsal column stimulator.  I have given a prescription of Relafen 750 mg twice daily.  He will return to see me and I will certainly keep you informed.        I greatly appreciate the opportunity to see and evaluate this individual.  If you have questions or concerns regarding issues that I may have overlooked please call me at any time: [phone].  Kenny Prince M.D.  [facility]  [id number]  [facility]  [id number]      I have received verbal consent from the patient to receive care via telehealth.

## 2020-06-29 NOTE — PATIENT INSTRUCTIONS
Things to think about:     Spinal Cord Stimulator may help with pain. (aka Dorsal column stimulator)    Shayy Littlejohn ([facility])

## 2020-06-29 NOTE — PROGRESS NOTES
"Rory Lal  1960  [id number]                        CHIEF COMPLAINT:  [ ]         MEDICAL HISTORY SINCE LAST ENCOUNTER:  [ ]           Past Medical History:   Diagnosis Date   • Arthritis    • Chronic left-sided low back pain    • Chronic narcotic use    • Chronic right hip pain    • History of chest pain 11/07/2019    Patient denies, noted by Dr. Gil on 2/5/2015   • History of MRSA infection 2015    Per pt, Left foot; treated with antibiotics and no reoccurence    • History of palpitations 11/07/2019    Patient denies, noted by Dr. Gil on 2/5/2015   • Hx of colonic polyp    • Hyperlipidemia    • Hypertension     Patient denies, noted by Dr. Gil 2/5/2015   • Impaired functional mobility, balance, gait, and endurance    • Impaired gait     R/T right hip pain    • Insomnia    • Lumbosacral disc disease    • Muscle spasm    • Neuropathy    • Non compliance w medication regimen     Patient reported \"I should take cholesterol medicine but I don't.\"    • Prehypertension    • RSD (reflex sympathetic dystrophy) 02/05/2015    MD note    • Smokeless tobacco use    • Use of cane as ambulatory aid    • Wears glasses     reading              Past Surgical History:   Procedure Laterality Date   • BACK SURGERY  2004    S1-L5 fusion   • COLONOSCOPY     • 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           Social History     Socioeconomic History   • Marital status:      Spouse name: Not on file   • Number of children: Not on file   • Years of education: Not on file   • Highest education level: Not on file   Occupational History     Employer: DISABLED   Tobacco Use   • Smoking status: Former Smoker     Types: Cigarettes   • Smokeless " tobacco: Current User     Types: Snuff   • Tobacco comment: Patient reports only used briefly   Substance and Sexual Activity   • Alcohol use: No     Frequency: Never   • Drug use: No   • Sexual activity: Defer              Allergies   Allergen Reactions   • Pollen Extract Other (See Comments)     Watery eyes              Current Outpatient Medications:   •  acetaminophen (TYLENOL) 325 MG tablet, Take 650 mg by mouth Every 6 (Six) Hours As Needed., Disp: , Rfl:   •  gabapentin (NEURONTIN) 100 MG capsule, Take 200 mg by mouth At Night As Needed., Disp: , Rfl:   •  oxyCODONE (ROXICODONE) 15 MG immediate release tablet, Take 15 mg by mouth Every 6 (Six) Hours As Needed for Moderate Pain ., Disp: , Rfl:          Review of Systems   Constitutional: Negative for activity change, appetite change, chills, diaphoresis, fatigue, fever and unexpected weight change.   HENT: Negative for congestion, dental problem, drooling, ear discharge, ear pain, facial swelling, hearing loss, mouth sores, nosebleeds, postnasal drip, rhinorrhea, sinus pressure, sneezing, sore throat, tinnitus, trouble swallowing and voice change.    Eyes: Negative for photophobia, pain, discharge, redness, itching and visual disturbance.   Respiratory: Negative for apnea, cough, choking, chest tightness, shortness of breath, wheezing and stridor.    Cardiovascular: Negative for chest pain, palpitations and leg swelling.   Gastrointestinal: Negative for abdominal distention, abdominal pain, anal bleeding, blood in stool, constipation, diarrhea, nausea, rectal pain and vomiting.   Endocrine: Negative for cold intolerance, heat intolerance, polydipsia, polyphagia and polyuria.   Genitourinary: Negative for decreased urine volume, difficulty urinating, dysuria, enuresis, flank pain, frequency, genital sores, hematuria and urgency.   Musculoskeletal: Positive for back pain and myalgias. Negative for arthralgias, gait problem, joint swelling, neck pain and neck  "stiffness.   Skin: Negative for color change, pallor, rash and wound.   Allergic/Immunologic: Negative for environmental allergies, food allergies and immunocompromised state.   Neurological: Negative for dizziness, tremors, seizures, syncope, facial asymmetry, speech difficulty, weakness, light-headedness, numbness and headaches.   Hematological: Negative for adenopathy. Does not bruise/bleed easily.   Psychiatric/Behavioral: Negative for agitation, behavioral problems, confusion, decreased concentration, dysphoric mood, hallucinations, self-injury, sleep disturbance and suicidal ideas. The patient is not nervous/anxious and is not hyperactive.                Vitals:    06/29/20 1054   BP: 154/92   BP Location: Right arm   Patient Position: Sitting   Temp: 98.4 °F (36.9 °C)   TempSrc: Temporal   Weight: 86.6 kg (191 lb)   Height: 171.5 cm (67.5\")               EXAMINATION: [ ]            MEDICAL DECISION MAKING: [ ]           ASSESSMENT/DISPOSITION: [ ]              I APPRECIATE THE OPPORTUNITY OF THIS REFERRAL. PLEASE CALL IF ANY       QUESTIONS [phone]      Scribed for Shawn Prince MD by Max Mix CMA. 6/29/2020 11:29  "

## 2020-07-07 ENCOUNTER — OFFICE VISIT (OUTPATIENT)
Dept: NEUROSURGERY | Facility: CLINIC | Age: 60
End: 2020-07-07

## 2020-07-07 VITALS — BODY MASS INDEX: 28.89 KG/M2 | TEMPERATURE: 98 F | WEIGHT: 190.6 LBS | HEIGHT: 68 IN

## 2020-07-07 DIAGNOSIS — M96.1 POST LAMINECTOMY SYNDROME: Primary | ICD-10-CM

## 2020-07-07 PROCEDURE — 99213 OFFICE O/P EST LOW 20 MIN: CPT | Performed by: NEUROLOGICAL SURGERY

## 2020-07-07 NOTE — PROGRESS NOTES
"Rory Gardner Omesravanizo  1960  [id number]                        CHIEF COMPLAINT: Back pain rating into the right groin and weakness with tingling in his right leg         MEDICAL HISTORY SINCE LAST ENCOUNTER: This 60-year-old reports today for follow-up subsequent to his last encounter for review of diagnostic studies.  He previously had undergone PLIF L5-S1 from which she is done exceedingly well.  He apparently had hip surgery and since that time has had pain rating from his sacroiliac or hip region into his right groin and into his right anterior thigh and distally.  Diagnostics were performed in a referral for us today for follow-up           Past Medical History:   Diagnosis Date   • Arthritis    • Chronic left-sided low back pain    • Chronic narcotic use    • Chronic right hip pain    • History of chest pain 11/07/2019    Patient denies, noted by Dr. Gil on 2/5/2015   • History of MRSA infection 2015    Per pt, Left foot; treated with antibiotics and no reoccurence    • History of palpitations 11/07/2019    Patient denies, noted by Dr. Gil on 2/5/2015   • Hx of colonic polyp    • Hyperlipidemia    • Hypertension     Patient denies, noted by Dr. Gil 2/5/2015   • Impaired functional mobility, balance, gait, and endurance    • Impaired gait     R/T right hip pain    • Insomnia    • Lumbosacral disc disease    • Muscle spasm    • Neuropathy    • Non compliance w medication regimen     Patient reported \"I should take cholesterol medicine but I don't.\"    • Prehypertension    • RSD (reflex sympathetic dystrophy) 02/05/2015    MD note    • Smokeless tobacco use    • Use of cane as ambulatory aid    • Wears glasses     reading              Past Surgical History:   Procedure Laterality Date   • BACK SURGERY  2004    S1-L5 fusion   • COLONOSCOPY     • 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           Social History     Socioeconomic History   • Marital status:      Spouse name: Not on file   • Number of children: Not on file   • Years of education: Not on file   • Highest education level: Not on file   Occupational History     Employer: DISABLED   Tobacco Use   • Smoking status: Former Smoker     Types: Cigarettes   • Smokeless tobacco: Current User     Types: Snuff   • Tobacco comment: Patient reports only used briefly   Substance and Sexual Activity   • Alcohol use: No     Frequency: Never   • Drug use: No   • Sexual activity: Defer              Allergies   Allergen Reactions   • Pollen Extract Other (See Comments)     Watery eyes              Current Outpatient Medications:   •  acetaminophen (TYLENOL) 325 MG tablet, Take 650 mg by mouth Every 6 (Six) Hours As Needed., Disp: , Rfl:   •  gabapentin (NEURONTIN) 100 MG capsule, Take 200 mg by mouth At Night As Needed., Disp: , Rfl:   •  nabumetone (RELAFEN) 750 MG tablet, Take 1 tablet by mouth 2 (Two) Times a Day., Disp: 60 tablet, Rfl: 0  •  oxyCODONE (ROXICODONE) 15 MG immediate release tablet, Take 15 mg by mouth Every 6 (Six) Hours As Needed for Moderate Pain ., Disp: , Rfl:          Review of Systems   Constitutional: Negative for activity change, appetite change, chills, diaphoresis, fatigue, fever and unexpected weight change.   HENT: Negative for congestion, dental problem, drooling, ear discharge, ear pain, facial swelling, hearing loss, mouth sores, nosebleeds, postnasal drip, rhinorrhea, sinus pressure, sneezing, sore throat, tinnitus, trouble swallowing and voice change.    Eyes: Negative for photophobia, pain, discharge, redness, itching and visual disturbance.   Respiratory: Negative for apnea, cough, choking, chest tightness, shortness of breath, wheezing and stridor.   "  Cardiovascular: Negative for chest pain, palpitations and leg swelling.   Gastrointestinal: Negative for abdominal distention, abdominal pain, anal bleeding, blood in stool, constipation, diarrhea, nausea, rectal pain and vomiting.   Endocrine: Negative for cold intolerance, heat intolerance, polydipsia, polyphagia and polyuria.   Genitourinary: Negative for decreased urine volume, difficulty urinating, dysuria, enuresis, flank pain, frequency, genital sores, hematuria and urgency.   Musculoskeletal: Positive for arthralgias. Negative for back pain, gait problem, joint swelling, myalgias, neck pain and neck stiffness.   Skin: Negative for color change, pallor, rash and wound.   Allergic/Immunologic: Negative for environmental allergies, food allergies and immunocompromised state.   Neurological: Positive for weakness. Negative for dizziness, tremors, seizures, syncope, facial asymmetry, speech difficulty, light-headedness, numbness and headaches.   Hematological: Negative for adenopathy. Does not bruise/bleed easily.   Psychiatric/Behavioral: Negative for agitation, behavioral problems, confusion, decreased concentration, dysphoric mood, hallucinations, self-injury, sleep disturbance and suicidal ideas. The patient is not nervous/anxious and is not hyperactive.    All other systems reviewed and are negative.              Vitals:    07/07/20 1517   Temp: 98 °F (36.7 °C)   TempSrc: Temporal   Weight: 86.5 kg (190 lb 9.6 oz)   Height: 171.5 cm (67.5\")               EXAMINATION: Straight leg raising, Lasègue and flip test are negative.  He has restriction of range of motion of his right hip with pain associated with internal and external rotation.  The deep tendon reflexes are hypoactive.  I am unable to discern any focal weakness other than limitation of flexion of his hip.            MEDICAL DECISION MAKING: The EMG and NCV shows a mild chronic L5 radiculopathy on the right; negative on the left.  The lumbar MRI " "shows postsurgical change without nerve root entrapment foraminal or central canal stenosis.           ASSESSMENT/DISPOSITION: The data set thus far does not suggest nerve root entrapment of the lumbar spine as the etiological explanation for his symptoms.  Obviously, the distribution is \"neuropathic\" however the etiology remains problematic.  I have given him a prescription of Relafen which he has yet to take.  I have asked him to see Dr. le for a facet block or injection in the hip.  He will call me afterward.  I do not find any indication that neurosurgical intervention is indicated or warranted.              I APPRECIATE THE OPPORTUNITY OF THIS REFERRAL. PLEASE CALL IF ANY       QUESTIONS [phone]        I have received verbal consent from the patient to receive care via telehealth.   "

## 2020-09-04 ENCOUNTER — TELEPHONE (OUTPATIENT)
Dept: NEUROSURGERY | Facility: CLINIC | Age: 60
End: 2020-09-04

## 2020-09-04 NOTE — TELEPHONE ENCOUNTER
"----- Message from Elvi THOMAS Arnold sent at 9/4/2020 11:23 AM EDT -----  Regarding: appointment  Contact: [phone]  Received a call from patient requesting an appointment. He did not get injections done advised in last office note. He wants to discuss \"the box thingy\" prior to getting injections done. I assume he means a spinal cord stimulator?? Okay to schedule follow up?  "

## 2020-09-15 DIAGNOSIS — Z01.818 PREOPERATIVE TESTING: Primary | ICD-10-CM

## 2021-06-21 ENCOUNTER — OFFICE VISIT (OUTPATIENT)
Dept: ORTHOPEDIC SURGERY | Facility: CLINIC | Age: 61
End: 2021-06-21

## 2021-06-21 VITALS — HEIGHT: 68 IN | TEMPERATURE: 97.5 F | WEIGHT: 198 LBS | BODY MASS INDEX: 30.01 KG/M2

## 2021-06-21 DIAGNOSIS — Z96.641 HISTORY OF TOTAL HIP REPLACEMENT, RIGHT: Primary | ICD-10-CM

## 2021-06-21 DIAGNOSIS — M51.36 LUMBAR DEGENERATIVE DISC DISEASE: ICD-10-CM

## 2021-06-21 PROCEDURE — 73502 X-RAY EXAM HIP UNI 2-3 VIEWS: CPT | Performed by: ORTHOPAEDIC SURGERY

## 2021-06-21 PROCEDURE — 99214 OFFICE O/P EST MOD 30 MIN: CPT | Performed by: ORTHOPAEDIC SURGERY

## 2021-06-21 NOTE — PROGRESS NOTES
Subjective   Patient ID: Rory Lal is a 61 y.o. male is here today for orthopaedic evaluation of recovery progress with treatment.  Follow-up of the Right Hip (Here for annual recheck of total right hip replacement 11/12/2019.)      CHIEF COMPLAINT:     Right total hip replacement routine annual visit.    History of Present Illness     Progress Note:  Patient reports that with treatments and since the last visit, overall hip pain is slight, most of the time pain free hip.  His hip mobility is full and his strength is good, and he is very satisfied with his total hip replacement recovery now 18 months from surgery.  His continues to have longstanding chronic low back pain and advanced chronic lumbar degenerative disease with prior lumbar instrumentation and fusion surgery in 2005.  He made a stable recovery from lumbar surgery though has been disabled many years and in chronic pain management since that time.  He also notices some newer right lateral calf numbness since his right hip replacement surgery. This was evaluated with Dr. Escobar and EMG/NCS study on 7-7-20 showed mild chronic right L5 radiculopathy and normal left leg.  He says he was told he might develop a foot drop, though did not and he has full motor 5/5 strength including right ankle plantarflexion,dorsiflexion and right foot strong 5/5 inversion and 5/5 eversion.  He also has 4+/5 right quadriceps and 5/5 right hamstring strength with a very stable motor exam to the lower extremities.  The patient is currently on his usual pain management regimen.  He takes multi-modal pain medication for his chronic back pain.  Sees Dr. Annmarie Benavides in [place] for pain management. Has had facet injections in neck and inquired about low back or hip injections today, and that he will review possible lumbar injection therapy with Dr. Benavides.  Discussed with patient that hip corticosteroid iinjection not typically advisable after placement of the hip  "replacement implants.  Physical therapy rehabilitation was done after his right total hip replacement and he did well.   Injection therapy has not  been given since prior to his hip replacement.  Since the last visit, patient has been tolerating normal activities of daily living and he tells me he has good hip function.  He tells me he does do certain activities including mowing the lawn and weed eating as best he can, though with limitations due to his condition. He states he cannot afford to pay for those services.  He ambulates stable with no cane or any assistive device, with no limp, no Trendelenburg limp.  He tends to keep his back flexed a little forward at times for comfort.     He is on permanent disability and mentioned his insurance issues and the relationship of his hip replacement being required after his mechanical fall from a swing when the swing rope came loose from the tree branch.     Past Medical History:   Diagnosis Date   • Arthritis    • Chronic left-sided low back pain    • Chronic narcotic use    • Chronic right hip pain    • History of chest pain 11/07/2019    Patient denies, noted by Dr. Gil on 2/5/2015   • History of MRSA infection 2015    Per pt, Left foot; treated with antibiotics and no reoccurence    • History of palpitations 11/07/2019    Patient denies, noted by Dr. Gil on 2/5/2015   • Hx of colonic polyp    • Hyperlipidemia    • Hypertension     Patient denies, noted by Dr. Gil 2/5/2015   • Impaired functional mobility, balance, gait, and endurance    • Impaired gait     R/T right hip pain    • Insomnia    • Lumbosacral disc disease    • Muscle spasm    • Neuropathy    • Non compliance w medication regimen     Patient reported \"I should take cholesterol medicine but I don't.\"    • Prehypertension    • RSD (reflex sympathetic dystrophy) 02/05/2015    MD note    • Smokeless tobacco use    • Use of cane as ambulatory aid    • Wears glasses     reading        Past Surgical " "History:   Procedure Laterality Date   • BACK SURGERY  2004    S1-L5 fusion-Dr. 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     • HERNIA REPAIR Right 1995    Inguinal    • TOTAL HIP ARTHROPLASTY Right 11/12/2019    Procedure: total hip arthroplasty, right;  Surgeon: Syd Ashley MD;  Location: [facility];  Service: Orthopedics        Family History   Problem Relation Age of Onset   • Alzheimer's disease Mother    • Hypertension Father         Social History     Socioeconomic History   • Marital status:      Spouse name: Not on file   • Number of children: Not on file   • Years of education: Not on file   • Highest education level: Not on file   Tobacco Use   • Smoking status: Former Smoker     Types: Cigarettes   • Smokeless tobacco: Current User     Types: Snuff   • Tobacco comment: Patient reports only used briefly   Substance and Sexual Activity   • Alcohol use: No   • Drug use: No   • Sexual activity: Defer       Allergies   Allergen Reactions   • Pollen Extract Other (See Comments)     Watery eyes       Review of Systems   Genitourinary: Hematuria: mild left quadricep weakness.   Musculoskeletal: Positive for arthralgias and back pain (chronic). Negative for gait problem and joint swelling.   Skin: Negative for color change, rash and wound.   Neurological: Positive for weakness and numbness (Occasional right lateral calf numbness.  He notes occasional tingling in his testicles and groin area though states he has been evaluated for a hernia and seen a urologist.).       I have reviewed the medical and surgical history, family history, social history, medications, and/or allergies, and the review of systems of this report.    Objective   Temp 97.5 °F (36.4 °C)   Ht 171.5 cm (67.5\")   Wt 89.8 kg (198 lb)   BMI 30.55 kg/m²   Physical Exam  Vitals reviewed.   Constitutional:       General: He is not in acute distress.     Appearance: He is " well-developed.   Musculoskeletal:      Lumbar back: Negative right straight leg raise test and negative left straight leg raise test.   Skin:     General: Skin is warm and dry.      Findings: No erythema or rash.   Neurological:      Mental Status: He is alert.   Psychiatric:         Speech: Speech normal.       Right Hip Exam     Tenderness   The patient is experiencing no tenderness.     Range of Motion   Abduction: 40   Flexion: 110   External rotation: 40   Internal rotation: 30     Muscle Strength   Abduction: 5/5   Adduction: 5/5   Flexion: 5/5     Tests   JAIME: negative  Fadir:  Negative FADIR test    Other   Erythema: absent  Scars: present (well healed)  Right hip sensation: decreased L5 dermatome approximately.  Pulse: present      Back Exam     Tenderness   The patient is experiencing tenderness in the lumbar and sacroiliac.    Range of Motion   Extension: 10   Flexion: 60     Muscle Strength   Left Quadriceps:  5/5   Right Hamstrings:  5/5   Left Hamstrings:  5/5     Tests   Straight leg raise right: negative  Straight leg raise left: negative        Extremity DVT signs are negative on physical exam with negative Jessica sign, no calf pain, no palpable cords and no skin tone change   Neurologic Exam     Mental Status   Attention: normal.   Speech: speech is normal   Level of consciousness: alert  Knowledge: good.     Motor Exam   Overall muscle tone: normal    Strength   Right strength: right quadricep 4+/5  Left quadriceps: 5/5  Right hamstrin/5  Left hamstrin/5  Right anterior tibial: 5/5  Left anterior tibial: 5/5  Right posterior tibial: 5/5  Left posterior tibial: 5/5  Right peroneal: 5/5  Left peroneal: 5/5    Gait, Coordination, and Reflexes     Gait  Gait: (stable independent gait, no Trendelenburg sign.)      Assessment/Plan     Independent Review of Radiographic Studies:    AP pelvis and lateral of the right hip, indication to evaluate status of prosthesis and joint, and compared with  prior imaging, shows no acute fracture or dislocation. Good position and alignment of prosthesis with no radiographic signs of loosening.  No heterotopic ossification.    Laboratory and Other Studies:  7-7-2020 EMG reviewed that showed mild chronic right L5 radiculopathy and normal left leg.     Medical Decision Making:    No complications of procedure and treatments.  Good progress, significantly improved after right total hip replacement.  Continue current treatment plan.    Procedures    Diagnoses and all orders for this visit:    1. History of total hip replacement, right (Primary)  -     XR Hip With or Without Pelvis 2 - 3 View Right; Future    2. Lumbar degenerative disc disease       Discussion of orthopaedic goals and activities and patient and/or guardian expressed appreciation.  Regular exercise as tolerated  Guided on proper techniques for mobility, strength, agility and/or conditioning exercises  Home exercise program ongoing  After care and dental prophylaxis for joint replacement prosthesis  Hip replacement precautions reviewed again.    Recommendations/Plan:  Exercise, medications, injections, other patient advice, and return appointment as noted.  Brace: No brace was given at today's visit.  Referral: No referrals made at today's visit.  Test/Studies: No additional studies ordered at this time.  Work/Activity Status: Usual activities, routine exercise as tolerated, light physical work as tolerated, no strenuous activity.    Return in about 2 years (around 6/21/2023) for Annual recheck, XOA pelvis and right hip.  Patient is encouraged and agreeable to call or return sooner for any issues or concerns.     Portions of this note have been scribed for Syd Ashley MD by Rashida Boucher CMA

## 2021-11-18 DIAGNOSIS — M25.551 ARTHRALGIA OF RIGHT HIP: Primary | ICD-10-CM

## 2021-11-18 DIAGNOSIS — Z96.641 HISTORY OF TOTAL HIP REPLACEMENT, RIGHT: ICD-10-CM

## 2022-03-17 ENCOUNTER — OFFICE VISIT (OUTPATIENT)
Dept: NEUROLOGY | Facility: CLINIC | Age: 62
End: 2022-03-17

## 2022-03-17 VITALS
DIASTOLIC BLOOD PRESSURE: 100 MMHG | TEMPERATURE: 97.7 F | HEIGHT: 67 IN | WEIGHT: 195.6 LBS | BODY MASS INDEX: 30.7 KG/M2 | OXYGEN SATURATION: 99 % | SYSTOLIC BLOOD PRESSURE: 142 MMHG | HEART RATE: 80 BPM

## 2022-03-17 DIAGNOSIS — R25.1 TREMOR: Primary | ICD-10-CM

## 2022-03-17 DIAGNOSIS — R29.2 HYPERREFLEXIA: ICD-10-CM

## 2022-03-17 DIAGNOSIS — G89.29 CHRONIC LOW BACK PAIN, UNSPECIFIED BACK PAIN LATERALITY, UNSPECIFIED WHETHER SCIATICA PRESENT: ICD-10-CM

## 2022-03-17 DIAGNOSIS — M54.50 CHRONIC LOW BACK PAIN, UNSPECIFIED BACK PAIN LATERALITY, UNSPECIFIED WHETHER SCIATICA PRESENT: ICD-10-CM

## 2022-03-17 PROCEDURE — 99203 OFFICE O/P NEW LOW 30 MIN: CPT | Performed by: NURSE PRACTITIONER

## 2022-03-17 RX ORDER — ATORVASTATIN CALCIUM 40 MG/1
TABLET, FILM COATED ORAL
COMMUNITY
Start: 2022-02-22

## 2022-03-17 NOTE — PROGRESS NOTES
"     New Patient Office Visit      Patient Name: Rory Lal  : 1960   MRN: [id number]     Referring Physician: Elvi Tobar APRN    Chief Complaint:    Chief Complaint   Patient presents with   • Consult     NP, in office to establish care for tremors in both hands.        History of Present Illness: Rory Lal is a 61 y.o. male who is here today to establish care with Neurology for tremors.  He has had a tremor to the left hand for about 2 years.  That has become worse over the past couple of months and he now has a tremor in the right hand.  He feels the tremors are better if he holds something in his hand.  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  He has significant constipation but takes narcotic medication daily.  He feels his walking has slowed and he some times has trouble picking up his feet.  He has drooling, \"every once in a while\".  He has difficulty drinking in the mornings and states, \"I can't take it all at once, I have to sip it\".  He denies double vision.  He feels his sense of smell and taste have decreased over the years.  He has chronic low back pain and has had surgery.  He states, \"I'm not having anymore surgeries\".  He says he has seen neurosurgery but does not want to go back.  Additional risk factors- BMI 30, chronic back pain on chronic narcotic therapy (sees pain management and has had lumbar spine surgery), right hip surgery, dyslipidemia, HTN.     Subjective      Review of Systems:   Review of Systems   Constitutional: Negative for fatigue, fever, unexpected weight gain and unexpected weight loss.   HENT: Negative for hearing loss, sore throat, swollen glands, tinnitus and trouble swallowing.    Eyes: Negative for blurred vision, double vision, photophobia and visual disturbance.   Respiratory: Negative for cough, chest tightness and shortness of breath.    Cardiovascular: Negative for chest pain, palpitations " "and leg swelling.   Gastrointestinal: Positive for constipation. Negative for diarrhea and nausea.   Endocrine: Negative for cold intolerance and heat intolerance.   Musculoskeletal: Positive for arthralgias and back pain. Negative for gait problem, neck pain and neck stiffness.   Skin: Negative for color change and rash.   Allergic/Immunologic: Negative for environmental allergies and food allergies.   Neurological: Positive for tremors. Negative for dizziness, syncope, facial asymmetry, speech difficulty, weakness, headache, memory problem and confusion.   Psychiatric/Behavioral: Negative for agitation, behavioral problems and depressed mood. The patient is not nervous/anxious.        Past Medical History:   Past Medical History:   Diagnosis Date   • Arthritis    • Chronic left-sided low back pain    • Chronic narcotic use    • Chronic right hip pain    • History of chest pain 11/07/2019    Patient denies, noted by Dr. Gil on 2/5/2015   • History of MRSA infection 2015    Per pt, Left foot; treated with antibiotics and no reoccurence    • History of palpitations 11/07/2019    Patient denies, noted by Dr. Gil on 2/5/2015   • Hx of colonic polyp    • Hyperlipidemia    • Hypertension     Patient denies, noted by Dr. Gil 2/5/2015   • Impaired functional mobility, balance, gait, and endurance    • Impaired gait     R/T right hip pain    • Insomnia    • Lumbosacral disc disease    • Muscle spasm    • Neuropathy    • Non compliance w medication regimen     Patient reported \"I should take cholesterol medicine but I don't.\"    • Prehypertension    • RSD (reflex sympathetic dystrophy) 02/05/2015    MD note    • Smokeless tobacco use    • Use of cane as ambulatory aid    • Wears glasses     reading       Past Surgical History:   Past Surgical History:   Procedure Laterality Date   • BACK SURGERY  2004    S1-L5 fusion-Dr. 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     • " "HERNIA REPAIR Right 1995    Inguinal    • TOTAL HIP ARTHROPLASTY Right 11/12/2019    Procedure: total hip arthroplasty, right;  Surgeon: Syd Ashley MD;  Location: [facility];  Service: Orthopedics       Family History:   Family History   Problem Relation Age of Onset   • Alzheimer's disease Mother    • Hypertension Father        Social History:   Social History     Socioeconomic History   • Marital status:    Tobacco Use   • Smoking status: Former Smoker     Types: Cigarettes   • Smokeless tobacco: Current User     Types: Snuff   • Tobacco comment: Patient reports only used briefly   Substance and Sexual Activity   • Alcohol use: No   • Drug use: No   • Sexual activity: Defer       Medications:     Current Outpatient Medications:   •  acetaminophen (TYLENOL) 325 MG tablet, Take 650 mg by mouth Every 6 (Six) Hours As Needed., Disp: , Rfl:   •  atorvastatin (LIPITOR) 40 MG tablet, , Disp: , Rfl:   •  gabapentin (NEURONTIN) 100 MG capsule, Take 200 mg by mouth At Night As Needed., Disp: , Rfl:   •  lisinopril 10 MG tablet 10 mg, hydroCHLOROthiazide 12.5 MG 12.5 mg, Take  by mouth., Disp: , Rfl:   •  oxyCODONE (ROXICODONE) 15 MG immediate release tablet, Take 15 mg by mouth Every 6 (Six) Hours As Needed for Moderate Pain ., Disp: , Rfl:     Allergies:   Allergies   Allergen Reactions   • Pollen Extract Other (See Comments)     Watery eyes       Objective     Physical Exam:  Vital Signs:   Vitals:    03/17/22 1003   BP: 142/100   BP Location: Right arm   Patient Position: Sitting   Cuff Size: Adult   Pulse: 80   Temp: 97.7 °F (36.5 °C)   SpO2: 99%   Weight: 88.7 kg (195 lb 9.6 oz)   Height: 170.2 cm (67\")   PainSc:   7   PainLoc: Back  Comment: lower back and hip     Body mass index is 30.64 kg/m².     Physical Exam  Vitals and nursing note reviewed.   Constitutional:       General: He is not in acute distress.     Appearance: Normal appearance. He is well-developed. He is not diaphoretic.   HENT:      " Head: Normocephalic and atraumatic.   Eyes:      Extraocular Movements: Extraocular movements intact.      Conjunctiva/sclera: Conjunctivae normal.      Pupils: Pupils are equal, round, and reactive to light.   Cardiovascular:      Rate and Rhythm: Normal rate and regular rhythm.      Heart sounds: Normal heart sounds. No murmur heard.    No friction rub. No gallop.   Pulmonary:      Effort: Pulmonary effort is normal. No respiratory distress.      Breath sounds: Normal breath sounds. No wheezing or rales.   Musculoskeletal:         General: Normal range of motion.   Skin:     General: Skin is warm and dry.      Findings: No rash.   Neurological:      Mental Status: He is alert and oriented to person, place, and time.      Coordination: Finger-Nose-Finger Test normal.      Gait: Gait is intact.      Deep Tendon Reflexes:      Reflex Scores:       Patellar reflexes are 3+ on the right side and 3+ on the left side.  Psychiatric:         Mood and Affect: Mood normal.         Speech: Speech normal.         Behavior: Behavior normal.         Thought Content: Thought content normal.         Judgment: Judgment normal.         Neurologic Exam     Mental Status   Oriented to person, place, and time.   Attention: normal. Concentration: normal.   Speech: speech is normal   Level of consciousness: alert  Knowledge: good.     Cranial Nerves   Cranial nerves II through XII intact.     CN II   Visual fields full to confrontation.     CN III, IV, VI   Pupils are equal, round, and reactive to light.  Right pupil: Size: 2 mm. Shape: regular.   Left pupil: Size: 2 mm. Shape: regular.   CN III: no CN III palsy  CN VI: no CN VI palsy  Nystagmus: none     CN V   Facial sensation intact.     CN VII   Facial expression full, symmetric.     CN VIII   CN VIII normal.     CN IX, X   CN IX normal.   CN X normal.     CN XI   CN XI normal.     CN XII   CN XII normal.     Motor Exam   Muscle bulk: normal  Overall muscle tone: normal    Strength    Right biceps: 5/5  Left biceps: 5/5  Right triceps: 5/5  Left triceps: 5/5  Right quadriceps: 5/5  Left quadriceps: 5/5  Right hamstrin/5  Left hamstrin/5Increased rigidity to bilateral upper extremities     Sensory Exam   Light touch normal.   Proprioception normal.     Gait, Coordination, and Reflexes     Gait  Gait: normal    Coordination   Finger to nose coordination: normal    Tremor   Resting tremor: present (medium velocity, medium frequency tremor to left hand and arm)  Intention tremor: present (low velocity, low frequency tremor to both hand with outstretched arms and upon reaching intention target)  Action tremor: absent    Reflexes   Reflexes 2+ except as noted.   Right patellar: 3+  Left patellar: 3+Able to go from sitting to standing position slowly on first attempt without assistance; frequently changes positions due to chronic back pain; normal arm swing bilaterally; able to complete a turn in 1-2 steps; normal gait.        Assessment / Plan      Assessment/Plan:   Diagnoses and all orders for this visit:    1. Tremor (Primary)  -     MRI Brain With & Without Contrast; Future  -     TSH; Future  -     AMAYA by IFA, Reflex 9-biomarkers profile; Future  -     Vitamin B12; Future  -     Methylmalonic Acid, Serum; Future  -     Folate; Future  -     RPR; Future    2. Hyperreflexia    3. Chronic low back pain, unspecified back pain laterality, unspecified whether sciatica present    4. BMI 30.0-30.9,adult    *I have provided education on Parkinson's disease and Essential tremor today.    *I have offered a trial of Primidone but patient refuses today. He feels his tremor isn't too back right now and he does not want to take another medication at this time.  He is agreeable to labs and MRI brain.   *Differential diagnoses- Essential tremor, Parkinsonian syndrome, Parkinson's disease, abnormal movement disorder.     Follow Up:   Return in about 2 months (around 2022) for Recheck.    Tiffanie TYSON  KRISTINA Potts, FNP-C  [facility]       Please note that portions of this note may have been completed with a voice recognition program. Efforts were made to edit the dictations, but occasionally words are mistranscribed.

## 2023-08-23 ENCOUNTER — OFFICE VISIT (OUTPATIENT)
Dept: NEUROSURGERY | Facility: CLINIC | Age: 63
End: 2023-08-23
Payer: MEDICARE

## 2023-08-23 VITALS
TEMPERATURE: 98.4 F | HEIGHT: 67 IN | BODY MASS INDEX: 30.61 KG/M2 | WEIGHT: 195 LBS | SYSTOLIC BLOOD PRESSURE: 160 MMHG | DIASTOLIC BLOOD PRESSURE: 106 MMHG

## 2023-08-23 DIAGNOSIS — M25.551 RIGHT HIP PAIN: ICD-10-CM

## 2023-08-23 DIAGNOSIS — M51.36 DDD (DEGENERATIVE DISC DISEASE), LUMBAR: Primary | ICD-10-CM

## 2023-08-23 DIAGNOSIS — M25.551 ARTHRALGIA OF HIP, RIGHT: ICD-10-CM

## 2023-08-23 DIAGNOSIS — G89.29 CHRONIC MIDLINE LOW BACK PAIN WITHOUT SCIATICA: ICD-10-CM

## 2023-08-23 DIAGNOSIS — M54.50 CHRONIC MIDLINE LOW BACK PAIN WITHOUT SCIATICA: ICD-10-CM

## 2023-08-23 DIAGNOSIS — M47.816 FACET ARTHROPATHY, LUMBAR: ICD-10-CM

## 2023-08-23 DIAGNOSIS — Z98.1 HISTORY OF FUSION OF LUMBAR SPINE: ICD-10-CM

## 2023-08-23 PROCEDURE — 1160F RVW MEDS BY RX/DR IN RCRD: CPT | Performed by: PHYSICIAN ASSISTANT

## 2023-08-23 PROCEDURE — 99204 OFFICE O/P NEW MOD 45 MIN: CPT | Performed by: PHYSICIAN ASSISTANT

## 2023-08-23 PROCEDURE — 1159F MED LIST DOCD IN RCRD: CPT | Performed by: PHYSICIAN ASSISTANT

## 2023-08-23 RX ORDER — PRAZOSIN HYDROCHLORIDE 1 MG/1
CAPSULE ORAL
COMMUNITY
Start: 2023-07-13

## 2023-08-23 RX ORDER — LISINOPRIL AND HYDROCHLOROTHIAZIDE 12.5; 1 MG/1; MG/1
TABLET ORAL
COMMUNITY

## 2023-08-23 RX ORDER — CYCLOBENZAPRINE HCL 10 MG
10 TABLET ORAL
COMMUNITY

## 2023-08-23 RX ORDER — NABUMETONE 750 MG/1
TABLET, FILM COATED ORAL
COMMUNITY

## 2023-08-23 RX ORDER — SERTRALINE HYDROCHLORIDE 25 MG/1
TABLET, FILM COATED ORAL
COMMUNITY
Start: 2023-08-08

## 2023-08-23 NOTE — PROGRESS NOTES
Patient: Rory Lal  : 1960  Chart #: [id number]    Date of Service: 2023    Chief Complaint   Patient presents with    Back Pain    Hip Pain     Left        Back Pain  Pertinent negatives include no abdominal pain, chest pain, dysuria, fever, headaches, numbness or weakness.   Hip Pain   Pertinent negatives include no numbness.   This is a 63-year-old gentleman was attacked by a dog on February 10, the patient reports that he fell back on his buttocks and since that time he has had increased right-sided low back pain with pain that radiates into the right hip.  He is referred for neurosurgical evaluation by orthopedic surgery who has treated him with a right hip arthroplasty 2019.  He has recently seen orthopedic surgery regarding right hip pain, x-rays show normal postop hip.  The patient  He endorses tingling in the right leg from the lower back into the scrotum into the lateral thigh, he also reports burning pain in the right lower back and buttocks.  He has previously been diagnosed with neuropathy in both feet.  The patient states that he has been having burning stabbing pain into the right foot for the last 1 to 2 years.  He says sometimes he gets pain into the left hip.  Patient presents today without new studies, he is scheduled to see pain management on  for an injection.    Chronic Illnesses:  Low back pain  Past Medical History:   Diagnosis Date    Arthritis     Chronic left-sided low back pain     Chronic narcotic use     Chronic right hip pain     History of chest pain 2019    Patient denies, noted by Dr. Gil on 2015    History of MRSA infection 2015    Per pt, Left foot; treated with antibiotics and no reoccurence     History of palpitations 2019    Patient denies, noted by Dr. Gil on 2015    Hx of colonic polyp     Hyperlipidemia     Hypertension     Patient denies, noted by Dr. Gil 2015    Impaired functional mobility,  "balance, gait, and endurance     Impaired gait     R/T right hip pain     Insomnia     Lumbosacral disc disease     Muscle spasm     Neuropathy     Non compliance w medication regimen     Patient reported \"I should take cholesterol medicine but I don't.\"     Prehypertension     RSD (reflex sympathetic dystrophy) 02/05/2015    MD note     Smokeless tobacco use     Use of cane as ambulatory aid     Wears glasses     reading         Past Surgical History:   Procedure Laterality Date    BACK SURGERY  2004    S1-L5 fusion-Dr. 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      HERNIA REPAIR Right 1995    Inguinal     TOTAL HIP ARTHROPLASTY Right 11/12/2019    Procedure: total hip arthroplasty, right;  Surgeon: Syd Ashley MD;  Location: [facility];  Service: Orthopedics       Allergies   Allergen Reactions    Pollen Extract Other (See Comments)     Watery eyes         Current Outpatient Medications:     acetaminophen (TYLENOL) 325 MG tablet, Take 2 tablets by mouth Every 6 (Six) Hours As Needed., Disp: , Rfl:     amLODIPine (NORVASC) 5 MG tablet, , Disp: , Rfl:     atorvastatin (LIPITOR) 10 MG tablet, , Disp: , Rfl:     cyclobenzaprine (FLEXERIL) 10 MG tablet, Take 1 tablet by mouth., Disp: , Rfl:     escitalopram (LEXAPRO) 5 MG tablet, , Disp: , Rfl:     lisinopril-hydrochlorothiazide (PRINZIDE,ZESTORETIC) 10-12.5 MG per tablet, , Disp: , Rfl:     nabumetone (RELAFEN) 750 MG tablet, , Disp: , Rfl:     oxyCODONE (ROXICODONE) 20 MG tablet, , Disp: , Rfl:     prazosin (MINIPRESS) 1 MG capsule, , Disp: , Rfl:     sertraline (ZOLOFT) 25 MG tablet, , Disp: , Rfl:     Social History     Socioeconomic History    Marital status:    Tobacco Use    Smoking status: Never    Smokeless tobacco: Current     Types: Snuff    Tobacco comments:     Patient reports only used briefly   Vaping Use    Vaping Use: Never used   Substance and Sexual Activity    Alcohol use: No    Drug use: " No    Sexual activity: Defer       Family History   Problem Relation Age of Onset    Alzheimer's disease Mother     Hypertension Father        BMI is >= 30 and <35. (Class 1 Obesity). The following options were offered after discussion;: referral to primary care       Social History    Tobacco Use      Smoking status: Never      Smokeless tobacco: Current        Types: Snuff      Tobacco comments: Patient reports only used briefly       Review of Systems   Constitutional:  Positive for fatigue. Negative for activity change, appetite change, chills, diaphoresis, fever and unexpected weight change.   HENT:  Positive for hearing loss. Negative for congestion, dental problem, drooling, ear discharge, ear pain, facial swelling, mouth sores, nosebleeds, postnasal drip, rhinorrhea, sinus pressure, sinus pain, sneezing, sore throat, tinnitus, trouble swallowing and voice change.    Eyes:  Negative for photophobia, pain, discharge, redness, itching and visual disturbance.   Respiratory:  Positive for apnea. Negative for cough, choking, chest tightness, shortness of breath, wheezing and stridor.    Cardiovascular:  Negative for chest pain, palpitations and leg swelling.   Gastrointestinal:  Negative for abdominal distention, abdominal pain, anal bleeding, blood in stool, constipation, diarrhea, nausea, rectal pain and vomiting.   Endocrine: Negative for cold intolerance, heat intolerance, polydipsia, polyphagia and polyuria.   Genitourinary:  Negative for decreased urine volume, difficulty urinating, dysuria, enuresis, flank pain, frequency, genital sores, hematuria, penile discharge, penile pain, penile swelling, scrotal swelling, testicular pain and urgency.   Musculoskeletal:  Positive for back pain. Negative for arthralgias, gait problem, joint swelling, myalgias, neck pain and neck stiffness.   Skin:  Negative for color change, pallor, rash and wound.   Allergic/Immunologic: Positive for environmental allergies. Negative  for food allergies and immunocompromised state.   Neurological:  Negative for dizziness, tremors, seizures, syncope, facial asymmetry, speech difficulty, weakness, light-headedness, numbness and headaches.   Hematological:  Negative for adenopathy. Does not bruise/bleed easily.   Psychiatric/Behavioral:  Positive for sleep disturbance. Negative for agitation, behavioral problems, confusion, decreased concentration, dysphoric mood, hallucinations, self-injury and suicidal ideas. The patient is not nervous/anxious and is not hyperactive.     Patient reports a significant amount of anxiety associated with his pain also with obligations at home.  He is having difficulty sleeping.  He is currently seeing a psychiatrist.  Patient has a long history of chronic pain and has been with Dr. Ron Benavides's office.  He has been tried on gabapentin in the past and unable to tolerate, he has not had Lyrica.    Gait & Balance Assessment:  Risk assessment for falls. Fall precautions:  such as;   Using gait aids a cane, walker at the appropriate height at all times for ambulation or if necessary a wheelchair  Removing all area rugs and coffee tables to create a safe environment at home  Ensure clean, dry floors  Wearing supportive footwear and properly fitting clothing  Ensure bed/chair is appropriate height and patient's feet can touch the floor  Using a shower transfer bench  Using walk-in shower and having shower safety bars installed  Ensure proper lighting, minimize glare  Have nightlights operational and in use  Participation in an exercise program for gait training, balance training and strength  Avoid carrying laundry up and down steps  Ensure proper compliance and organization of medications to avoid errors   Avoid use of over the counter sedatives and alcohol consumption  Ensure easy access to call bell, glasses, TV control, telephone  Ensure glasses/hearing aids are in use or close by (on top of night table)     Physical  "examination:  Blood pressure (!) 160/106, temperature 98.4 øF (36.9 øC), temperature source Infrared, height 170 cm (66.93\"), weight 88.5 kg (195 lb).  HEENT- normocephalic, atraumatic, sclera clear  Lungs-normal expansion, no wheezing  Heart-regular rate and rhythm  Extremities-positive pulses, no edema    Neurologic Exam  WDWNWM  A/A/C, speech clear, attention normal, conversant, answers questions appropriately, good historian.  Cranial nerves II through XII are intact.  Motor examination does not reveal weakness in the lower extremities.   Sensation is intact.  Gait is normal, balance is normal.   No tremors are noted.  Reflexes are intact.   Shaffer is negative. Clonus is negative.   Palpation of the right-sided lower back right hip is tender.  Straight leg raising causes muscle pulling.    Radiographic Imaging:  For my review is an x-ray of the right hip which reports is essentially negative with an intact TKA, left hip shows moderate joint space narrowing minimal osteophyte formation, unchanged from prior films.  I have an MRI of the lumbar spine from 2020 which is reviewed.  This reveals degenerative disc disease and facet arthropathy.    Medical Decision Making  Diagnoses and all orders for this visit:    1. DDD (degenerative disc disease), lumbar (Primary)  -     MRI Lumbar Spine Without Contrast; Future  -     XR Spine Lumbar AP & Lateral With Flex & Ext    2. History of fusion of lumbar spine  -     MRI Lumbar Spine Without Contrast; Future  -     XR Spine Lumbar AP & Lateral With Flex & Ext    3. Right hip pain  -     MRI Lumbar Spine Without Contrast; Future  -     XR Spine Lumbar AP & Lateral With Flex & Ext    4. Arthralgia of hip, right    5. Chronic midline low back pain without sciatica    6. Facet arthropathy, lumbar    Rory is a 63-year-old gentleman with prior lumbar fusion at L5-S1.  He was not down by an angry dog and since that time has had midline right buttocks right hip and right leg " pain.  He requires a new MRI of the lumbar spine as well as x-rays.  He will return to see me in follow-up when these are completed.    Any copied data from previous notes included in the (1) HPI, (2) PE, (3) MDM and/or assessment and plan has been reviewed and is accurate as of this day.    Including assessment, review of prior documentation, review and interpretation of new diagnostic studies, discussing these findings with the patient and documentation, 30 minutes total time was spent on this appointment.    MYA Zuñiga    Patient Care Team:  Elvi Tobar APRN as PCP - General (Family Medicine)  Erin Charles APRN as Nurse Practitioner (Cardiology)  Marylin Arroyo PA-C as Referring Physician (Physician Assistant)  Kenya Aguirre PA-C as Consulting Physician (Physician Assistant)

## 2023-08-24 PROBLEM — M51.36 DDD (DEGENERATIVE DISC DISEASE), LUMBAR: Status: ACTIVE | Noted: 2023-08-24

## 2023-08-24 PROBLEM — M47.816 FACET ARTHROPATHY, LUMBAR: Status: ACTIVE | Noted: 2023-08-24

## 2023-09-15 ENCOUNTER — APPOINTMENT (OUTPATIENT)
Dept: GENERAL RADIOLOGY | Facility: HOSPITAL | Age: 63
End: 2023-09-15
Payer: MEDICARE

## 2023-09-15 ENCOUNTER — HOSPITAL ENCOUNTER (EMERGENCY)
Facility: HOSPITAL | Age: 63
Discharge: HOME | End: 2023-09-15
Attending: EMERGENCY MEDICINE
Payer: MEDICARE

## 2023-09-15 VITALS
HEIGHT: 67 IN | DIASTOLIC BLOOD PRESSURE: 121 MMHG | SYSTOLIC BLOOD PRESSURE: 165 MMHG | WEIGHT: 192.6 LBS | TEMPERATURE: 98 F | HEART RATE: 87 BPM | RESPIRATION RATE: 16 BRPM | OXYGEN SATURATION: 97 % | BODY MASS INDEX: 30.23 KG/M2

## 2023-09-15 DIAGNOSIS — M16.12 ARTHRITIS OF LEFT HIP: Primary | ICD-10-CM

## 2023-09-15 PROCEDURE — 99282 EMERGENCY DEPT VISIT SF MDM: CPT

## 2023-09-15 PROCEDURE — 73502 X-RAY EXAM HIP UNI 2-3 VIEWS: CPT

## 2023-09-15 NOTE — ED PROVIDER NOTES
"Subjective:  History of Present Illness:    Patient is a 63-year-old male with history of arthritis, chronic left-sided low back pain, lumbosacral disc disease, muscle spasm, and neuropathy.  Patient presents to the ER with left-sided hip pain since May that started after a fall.  Patient reports that pain has gotten worse over time.  Reports that pain radiates from hip into groin.  Denies OTC medication or home remedy.  Denies alleviating or exacerbating factors.    Nurses Notes reviewed and agree, including vitals, allergies, social history and prior medical history.     REVIEW OF SYSTEMS: All systems reviewed and not pertinent unless noted.  Review of Systems   Musculoskeletal:         Pain in left hip   All other systems reviewed and are negative.    Past Medical History:   Diagnosis Date    Arthritis     Chronic left-sided low back pain     Chronic narcotic use     Chronic right hip pain     History of chest pain 11/07/2019    Patient denies, noted by Dr. Gil on 2/5/2015    History of MRSA infection 2015    Per pt, Left foot; treated with antibiotics and no reoccurence     History of palpitations 11/07/2019    Patient denies, noted by Dr. Gil on 2/5/2015    Hx of colonic polyp     Hyperlipidemia     Hypertension     Patient denies, noted by Dr. Gil 2/5/2015    Impaired functional mobility, balance, gait, and endurance     Impaired gait     R/T right hip pain     Insomnia     Lumbosacral disc disease     Muscle spasm     Neuropathy     Non compliance w medication regimen     Patient reported \"I should take cholesterol medicine but I don't.\"     Prehypertension     RSD (reflex sympathetic dystrophy) 02/05/2015    MD note     Smokeless tobacco use     Use of cane as ambulatory aid     Wears glasses     reading       Allergies:    Pollen extract      Past Surgical History:   Procedure Laterality Date    BACK SURGERY  2004    S1-L5 fusion-Dr. Prince    COLONOSCOPY      DIGIT REATTACHMENT Left 1987    Injury " "from construction work      HERNIA REPAIR Right 1995    Inguinal     TOTAL HIP ARTHROPLASTY Right 11/12/2019    Procedure: total hip arthroplasty, right;  Surgeon: Syd Ashley MD;  Location: [facility];  Service: Orthopedics         Social History     Socioeconomic History    Marital status:    Tobacco Use    Smoking status: Never    Smokeless tobacco: Current     Types: Snuff    Tobacco comments:     Patient reports only used briefly   Vaping Use    Vaping Use: Never used   Substance and Sexual Activity    Alcohol use: No    Drug use: No    Sexual activity: Defer         Family History   Problem Relation Age of Onset    Alzheimer's disease Mother     Hypertension Father        Objective  Physical Exam:  BP (!) 165/121   Pulse 87   Temp 98 °F (36.7 °C)   Resp 16   Ht 170.2 cm (67\")   Wt 87.4 kg (192 lb 9.6 oz)   SpO2 97%   BMI 30.17 kg/m²      Physical Exam  Vitals and nursing note reviewed.   Constitutional:       Appearance: Normal appearance. He is normal weight.   HENT:      Head: Normocephalic and atraumatic.      Nose: Nose normal.      Mouth/Throat:      Mouth: Mucous membranes are moist.      Pharynx: Oropharynx is clear.   Eyes:      Extraocular Movements: Extraocular movements intact.      Conjunctiva/sclera: Conjunctivae normal.      Pupils: Pupils are equal, round, and reactive to light.   Cardiovascular:      Rate and Rhythm: Normal rate and regular rhythm.   Pulmonary:      Effort: Pulmonary effort is normal.      Breath sounds: Normal breath sounds.   Abdominal:      General: Abdomen is flat. Bowel sounds are normal.      Palpations: Abdomen is soft.   Musculoskeletal:         General: Normal range of motion.      Cervical back: Normal range of motion and neck supple.   Skin:     General: Skin is warm and dry.      Capillary Refill: Capillary refill takes less than 2 seconds.   Neurological:      General: No focal deficit present.      Mental Status: He is alert and oriented to " person, place, and time. Mental status is at baseline.   Psychiatric:         Mood and Affect: Mood normal.         Behavior: Behavior normal.         Thought Content: Thought content normal.         Judgment: Judgment normal.       Procedures    ED Course:         Lab Results (last 24 hours)       ** No results found for the last 24 hours. **             XR Hip With or Without Pelvis 2 - 3 View Left    Result Date: 9/15/2023  PROCEDURE: XR HIP W OR WO PELVIS 2-3 VIEW LEFT-  HISTORY: Pain in left hip  COMPARISON: None.  FINDINGS:  A two view exam demonstrates no acute fracture or dislocation. The joint spaces demonstrate total right hip arthroplasty. Posterior fusion hardware identified in the lower lumbar spine. There is mild osteopenia. Moderate narrowing of the left hip joint identified. There are 4 surgical clips projected over the scrotum. No soft tissue abnormality is seen.      Impression: No acute bony abnormality.      This report was signed and finalized on 9/15/2023 4:24 PM by Catalina Jones MD.          MDM      Initial impression of presenting illness: Patient is a 63-year-old male with history of arthritis, chronic left-sided low back pain, lumbosacral disc disease, muscle spasm, and neuropathy.  Patient presents to the ER with left-sided hip pain since May that started after a fall.  Patient reports that pain has gotten worse over time.  Reports that pain radiates from hip into groin.  Denies OTC medication or home remedy.  Denies alleviating or exacerbating factors.    DDX: includes but is not limited to: Left hip strain, left hip sprain, hip fracture or other    Patient arrives stable with vitals interpreted by myself.     Pertinent features from physical exam: Physical examination essentially unremarkable..    Initial diagnostic plan: X-ray of left hip    Results from initial plan were reviewed and interpreted by me revealing there appears to be degenerative changes to the left hip.    Diagnostic  information from other sources: Chart review    Interventions / Re-evaluation: Vital signs stable throughout encounter.  Offered patient dexamethasone and Toradol he does not want these medications he is requesting morphine.  I explained to patient that morphine is not indicated for chronic arthritis.  Have offered orthopedic follow-up.  He just wants his disc and wants to leave.    Results/clinical rationale were discussed with patient    Consultations/Discussion of results with other physicians: N/A    Disposition plan: Patient is hemodynamically stable nontoxic-appearing appropriate for discharge.  -----        Final diagnoses:   Arthritis of left hip          Rian Childs, APRN  09/15/23 1635

## 2023-09-29 ENCOUNTER — HOSPITAL ENCOUNTER (OUTPATIENT)
Dept: MRI IMAGING | Facility: HOSPITAL | Age: 63
Discharge: HOME | End: 2023-09-29
Admitting: PHYSICIAN ASSISTANT
Payer: MEDICARE

## 2023-09-29 DIAGNOSIS — Z98.1 HISTORY OF FUSION OF LUMBAR SPINE: ICD-10-CM

## 2023-09-29 DIAGNOSIS — M51.36 DDD (DEGENERATIVE DISC DISEASE), LUMBAR: ICD-10-CM

## 2023-09-29 DIAGNOSIS — M25.551 RIGHT HIP PAIN: ICD-10-CM

## 2023-09-29 PROCEDURE — 72148 MRI LUMBAR SPINE W/O DYE: CPT

## 2023-10-02 ENCOUNTER — OFFICE VISIT (OUTPATIENT)
Dept: NEUROSURGERY | Facility: CLINIC | Age: 63
End: 2023-10-02
Payer: MEDICARE

## 2023-10-02 VITALS
HEIGHT: 67 IN | BODY MASS INDEX: 30.17 KG/M2 | TEMPERATURE: 98.6 F | SYSTOLIC BLOOD PRESSURE: 128 MMHG | DIASTOLIC BLOOD PRESSURE: 82 MMHG | WEIGHT: 192.2 LBS

## 2023-10-02 DIAGNOSIS — M25.552 LEFT HIP PAIN: ICD-10-CM

## 2023-10-02 DIAGNOSIS — M16.12 OSTEOARTHRITIS OF LEFT HIP, UNSPECIFIED OSTEOARTHRITIS TYPE: ICD-10-CM

## 2023-10-02 DIAGNOSIS — M51.36 DDD (DEGENERATIVE DISC DISEASE), LUMBAR: ICD-10-CM

## 2023-10-02 DIAGNOSIS — M54.50 CHRONIC MIDLINE LOW BACK PAIN WITHOUT SCIATICA: Primary | ICD-10-CM

## 2023-10-02 DIAGNOSIS — Z98.1 HISTORY OF FUSION OF LUMBAR SPINE: ICD-10-CM

## 2023-10-02 DIAGNOSIS — G89.29 CHRONIC MIDLINE LOW BACK PAIN WITHOUT SCIATICA: Primary | ICD-10-CM

## 2023-10-02 DIAGNOSIS — M47.816 FACET ARTHROPATHY, LUMBAR: ICD-10-CM

## 2023-10-02 PROCEDURE — 99214 OFFICE O/P EST MOD 30 MIN: CPT | Performed by: PHYSICIAN ASSISTANT

## 2023-10-02 RX ORDER — LIDOCAINE 50 MG/G
1 PATCH TOPICAL EVERY 24 HOURS
Qty: 30 PATCH | Refills: 0 | COMMUNITY
Start: 2023-09-16 | End: 2023-10-16

## 2023-10-02 NOTE — PROGRESS NOTES
"  Patient: Rory aLl  : 1960  Chart #: [id number]    Date of Service:10/2/23    CC: left hip pain      Back Pain  Pertinent negatives include no abdominal pain, chest pain, dysuria, fever, headaches, numbness or weakness.   Hip Pain   Pertinent negatives include no numbness.   This is a 63-year-old gentleman was attacked by a dog on February 10, the patient reports that he fell back on his buttocks and since that time he has had increased right-sided low back pain with pain that radiates into the right hip.  He was referred for neurosurgical evaluation by orthopedic surgery who had treated him with a right hip arthroplasty 2019.  He had recently seen orthopedic surgery regarding right hip pain, x-rays show normal postop hip.  Today the patient endorses pain in the left back into the left groin and hip.     Chronic Illnesses:  Low back pain  Past Medical History:   Diagnosis Date    Arthritis     Chronic left-sided low back pain     Chronic narcotic use     Chronic right hip pain     History of chest pain 2019    Patient denies, noted by Dr. Gil on 2015    History of MRSA infection 2015    Per pt, Left foot; treated with antibiotics and no reoccurence     History of palpitations 2019    Patient denies, noted by Dr. Gil on 2015    Hx of colonic polyp     Hyperlipidemia     Hypertension     Patient denies, noted by Dr. Gil 2015    Impaired functional mobility, balance, gait, and endurance     Impaired gait     R/T right hip pain     Insomnia     Lumbosacral disc disease     Muscle spasm     Neuropathy     Non compliance w medication regimen     Patient reported \"I should take cholesterol medicine but I don't.\"     Prehypertension     RSD (reflex sympathetic dystrophy) 2015    MD note     Smokeless tobacco use     Use of cane as ambulatory aid     Wears glasses     reading         Past Surgical History:   Procedure Laterality Date    BACK SURGERY      " S1-L5 fusion-Dr. 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      HERNIA REPAIR Right 1995    Inguinal     TOTAL HIP ARTHROPLASTY Right 11/12/2019    Procedure: total hip arthroplasty, right;  Surgeon: Syd Ashley MD;  Location: [facility];  Service: Orthopedics       Allergies   Allergen Reactions    Pollen Extract Other (See Comments)     Watery eyes         Current Outpatient Medications:     lidocaine (LIDODERM) 5 %, Place 1 patch on the skin as directed by provider Daily., Disp: 30 patch, Rfl: 0    acetaminophen (TYLENOL) 325 MG tablet, Take 2 tablets by mouth Every 6 (Six) Hours As Needed., Disp: , Rfl:     amLODIPine (NORVASC) 5 MG tablet, , Disp: , Rfl:     atorvastatin (LIPITOR) 10 MG tablet, , Disp: , Rfl:     cyclobenzaprine (FLEXERIL) 10 MG tablet, Take 1 tablet by mouth., Disp: , Rfl:     escitalopram (LEXAPRO) 5 MG tablet, , Disp: , Rfl:     lisinopril-hydrochlorothiazide (PRINZIDE,ZESTORETIC) 10-12.5 MG per tablet, , Disp: , Rfl:     nabumetone (RELAFEN) 750 MG tablet, , Disp: , Rfl:     oxyCODONE (ROXICODONE) 20 MG tablet, , Disp: , Rfl:     prazosin (MINIPRESS) 1 MG capsule, , Disp: , Rfl:     sertraline (ZOLOFT) 25 MG tablet, , Disp: , Rfl:     Social History     Socioeconomic History    Marital status:    Tobacco Use    Smoking status: Never    Smokeless tobacco: Current     Types: Snuff    Tobacco comments:     Patient reports only used briefly   Vaping Use    Vaping Use: Never used   Substance and Sexual Activity    Alcohol use: No    Drug use: No    Sexual activity: Defer       Family History   Problem Relation Age of Onset    Alzheimer's disease Mother     Hypertension Father        BMI is >= 30 and <35. (Class 1 Obesity). The following options were offered after discussion;: referral to primary care       Social History    Tobacco Use      Smoking status: Never      Smokeless tobacco: Current        Types: Snuff      Tobacco comments:  Patient reports only used briefly       Review of Systems   Constitutional:  Positive for fatigue. Negative for activity change, appetite change, chills, diaphoresis, fever and unexpected weight change.   HENT:  Positive for hearing loss. Negative for congestion, dental problem, drooling, ear discharge, ear pain, facial swelling, mouth sores, nosebleeds, postnasal drip, rhinorrhea, sinus pressure, sinus pain, sneezing, sore throat, tinnitus, trouble swallowing and voice change.    Eyes:  Negative for photophobia, pain, discharge, redness, itching and visual disturbance.   Respiratory:  Positive for apnea. Negative for cough, choking, chest tightness, shortness of breath, wheezing and stridor.    Cardiovascular:  Negative for chest pain, palpitations and leg swelling.   Gastrointestinal:  Negative for abdominal distention, abdominal pain, anal bleeding, blood in stool, constipation, diarrhea, nausea, rectal pain and vomiting.   Endocrine: Negative for cold intolerance, heat intolerance, polydipsia, polyphagia and polyuria.   Genitourinary:  Negative for decreased urine volume, difficulty urinating, dysuria, enuresis, flank pain, frequency, genital sores, hematuria, penile discharge, penile pain, penile swelling, scrotal swelling, testicular pain and urgency.   Musculoskeletal:  Positive for arthralgias, back pain and gait problem. Negative for joint swelling, myalgias, neck pain and neck stiffness.   Skin:  Negative for color change, pallor, rash and wound.   Allergic/Immunologic: Positive for environmental allergies. Negative for food allergies and immunocompromised state.   Neurological:  Negative for dizziness, tremors, seizures, syncope, facial asymmetry, speech difficulty, weakness, light-headedness, numbness and headaches.   Hematological:  Negative for adenopathy. Does not bruise/bleed easily.   Psychiatric/Behavioral:  Positive for sleep disturbance. Negative for agitation, behavioral problems, confusion,  "decreased concentration, dysphoric mood, hallucinations, self-injury and suicidal ideas. The patient is not nervous/anxious and is not hyperactive.     Patient reports a significant amount of anxiety associated with his pain also with obligations at home.  He is having difficulty sleeping.  He is currently seeing a psychiatrist.  Patient has a long history of chronic pain and has been with Dr. Ron Benavides's office.    Gait & Balance Assessment:  Risk assessment for falls. Fall precautions:  such as;   Using gait aids a cane, walker at the appropriate height at all times for ambulation or if necessary a wheelchair  Removing all area rugs and coffee tables to create a safe environment at home  Ensure clean, dry floors  Wearing supportive footwear and properly fitting clothing  Ensure bed/chair is appropriate height and patient's feet can touch the floor  Using a shower transfer bench  Using walk-in shower and having shower safety bars installed  Ensure proper lighting, minimize glare  Have nightlights operational and in use  Participation in an exercise program for gait training, balance training and strength  Avoid carrying laundry up and down steps  Ensure proper compliance and organization of medications to avoid errors   Avoid use of over the counter sedatives and alcohol consumption  Ensure easy access to call bell, glasses, TV control, telephone  Ensure glasses/hearing aids are in use or close by (on top of night table)     Physical examination:  Blood pressure 128/82, temperature 98.6 °F (37 °C), temperature source Infrared, height 170.2 cm (67\"), weight 87.2 kg (192 lb 3.2 oz).  HEENT- normocephalic, atraumatic, sclera clear  Lungs-normal expansion, no wheezing  Heart-regular rate and rhythm  Extremities-positive pulses, no edema    Neurologic Exam  WDWNWM  A/A/C, speech clear, attention normal, conversant, answers questions appropriately, good historian.  Cranial nerves II through XII are intact.  Motor " examination does not reveal weakness in the lower extremities.   Sensation is intact.  Gait is abnormal, he walks with a cane in a slightly flexed forward position, balance is normal.   No tremors are noted.  Reflexes are intact.   Shaffer is negative. Clonus is negative.   Palpation of the left-sided lower back,left hip is tender.  Straight leg raising causes back and hip pain. Hip rotation is severely painful and he guards his leg.    Radiographic Imaging:  For my review is an x-ray of the right hip which reports is essentially negative with an intact TKA, left hip shows moderate joint space narrowing minimal osteophyte formation, unchanged from prior films.  Lumbar MRI is reviewed.  CT left hip on 9/16/23 revealed moderate degenerative osteoarthritis of the left hip..    Medical Decision Making  Diagnoses and all orders for this visit:    1. Chronic midline low back pain without sciatica (Primary)    2. Left hip pain    3. Osteoarthritis of left hip, unspecified osteoarthritis type    4. DDD (degenerative disc disease), lumbar    5. Facet arthropathy, lumbar    6. History of fusion of lumbar spine    Rory is a 63-year-old gentleman with prior lumbar fusion at L5-S1.  He was knocked down by an angry dog and since that time has had midline right buttocks right hip and right leg pain.  He returns today with pain in the left hip and groin. He does not think his pain is from his lumbar spine but his hip. He is scheduled to see PM in 2 days for a hip injection. He will call me with an update.    Any copied data from previous notes included in the (1) HPI, (2) PE, (3) MDM and/or assessment and plan has been reviewed and is accurate as of this day.    Including assessment, review of prior documentation, review and interpretation of new diagnostic studies, discussing these findings with the patient and documentation, 30 minutes total time was spent on this appointment.    Marilin Aguirre, MYA    Patient Care Team:  Ekaterina  KRISTINA Song as PCP - General (Family Medicine)  Erin Charles APRN as Nurse Practitioner (Cardiology)  Marylin Arroyo PA-C as Referring Physician (Physician Assistant)  Kenya Aguirre PA-C as Consulting Physician (Physician Assistant)

## 2024-04-29 PROBLEM — R07.89 CHEST PAIN, ATYPICAL: Status: ACTIVE | Noted: 2024-04-29

## 2024-04-29 PROBLEM — Z72.0 SMOKELESS TOBACCO USE: Status: ACTIVE | Noted: 2024-04-29

## 2024-04-29 PROBLEM — I10 ESSENTIAL HYPERTENSION: Status: ACTIVE | Noted: 2024-04-29

## 2024-04-29 NOTE — PROGRESS NOTES
Subjective:     Encounter Date:04/30/2024    Patient ID: Rory Lal is a 64 y.o.  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    REFERRING PROVIDER: KRISTINA Umaña    Chief Complaint:   Chief Complaint   Patient presents with    Chest Pain     Problem List:  Atypical chest pain  Remote stress test approximately 15 years ago; negative per patient-data deficit  ECG acceptable April 2024  Hypertension  Hyperlipidemia; on statin therapy  Mild obesity, BMI 30.26  Spinal stenosis  Anxiety  Osteoarthritis  Current smokeless tobacco use; 1 can per day  Surgical history:  Left hip replacement  Hernia repair  Left hand contracture surgery  Lumbar fusion back surgery    Allergies   Allergen Reactions    Pollen Extract Other (See Comments)     Watery eyes         Current Outpatient Medications:     acetaminophen (TYLENOL) 325 MG tablet, Take 2 tablets by mouth Every 6 (Six) Hours As Needed., Disp: , Rfl:     amLODIPine (NORVASC) 5 MG tablet, Take 2 tablets by mouth Daily., Disp: , Rfl:     atorvastatin (LIPITOR) 10 MG tablet, , Disp: , Rfl:     Ibuprofen ([employer] Ibuprofen) 200 MG capsule, [employer] Ibuprofen 200 MG Oral Capsule QTY: 0 capsule Days: 0 Refills: 0  Written: 01/24/24 Patient Instructions:, Disp: , Rfl:     naloxone (Narcan) 4 MG/0.1ML nasal spray, as directed in case of opioid toxicity, Disp: , Rfl:     oxyCODONE (ROXICODONE) 20 MG tablet, Take 1 tablet by mouth Every 4 (Four) Hours As Needed., Disp: , Rfl:     History of Present Illness  This is a 64-year-old white male who presents to establish care in the hypertension clinic and also for atypical chest pain. The patient canceled his February 2023 appointment with me.  There has been question in the past about compliance with antihypertensive medications.  He was started on amlodipine 5 mg daily and then about 2 months ago this was increased to 10 mg daily.  The patient does not feel like it has done much good for his blood  pressure and he sometimes will have readings up to 160 systolic.  Most the time it is around 130 systolic.  He has a cuffed blood pressure monitor to use at home. The patient denies any excessive NSAID use, sodium intake, excessive caffeine use, smoking history,  kidney dysfunction, MIs, heart catheterizations, CVAs, TIAs, seizures, DVTs, PEs, COPD, asthma, thyroid dysfunction, diabetes mellitus, or rheumatic fever.  The patient has a lot of neuropathy in his feet and has spinal stenosis.  He remotely had a lumbar back surgery.  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  The patient denies shortness of breath, palpitations, dizziness, presyncope, syncope, snoring, apneic spells. He will sometimes have some chest pain that last for only a second or 2 when he is sitting down.  He has had history of reflux in the past as well.  The patient remotely had a stress test about 15 years ago and was given medication and he did not like the way that it made him feel and does not want to do another stress test.  He is agreeable to CT cardiac calcium score.  He uses 1 can of smokeless tobacco per day and is agreeable to try Nicorette gum for tobacco cessation.    Cardiovascular Disease Risk Factors  Hypertension, hyperlipidemia, tobacco abuse, obesity, increased age, male gender    Social History     Socioeconomic History    Marital status:    Tobacco Use    Smoking status: Never     Passive exposure: Never    Smokeless tobacco: Current     Types: Snuff    Tobacco comments:     Patient reports only used briefly   Vaping Use    Vaping status: Never Used   Substance and Sexual Activity    Alcohol use: No    Drug use: No    Sexual activity: Defer       Family History   Problem Relation Age of Onset    Alzheimer's disease Mother     Hypertension Father        Review of Systems   Eyes:  Positive for blurred vision.   Cardiovascular:  Positive for chest pain.   Musculoskeletal:  Positive  for arthritis.   Psychiatric/Behavioral:  The patient is nervous/anxious.       Obtained and negative except as outlined in problem list and HPI.      ECG 12 Lead    Date/Time: 4/30/2024 1:26 PM  Performed by: Erin Charles APRN    Authorized by: Erin Charles APRN  Rhythm comments: Normal sinus rhythm, minimal voltage criteria for LVH, may be normal variant, 79 bpm,  ms, QRS 84 ms, QTc 428 ms, changes from last ECG November 2019             Objective:       Vitals:    04/30/24 1324 04/30/24 1325 04/30/24 1347 04/30/24 1348   BP: (!) 155/101 130/84 130/84 124/82   BP Location: Left arm Left arm Right arm Left arm   Patient Position: Sitting Standing Sitting Sitting   Pulse: 88 95     SpO2: 95% 98%     Weight:       Height:         Body mass index is 30.26 kg/m².    Constitutional:       Appearance: Healthy appearance. Not in distress.   Neck:      Vascular: No JVR. JVD normal.   Pulmonary:      Effort: Pulmonary effort is normal.      Breath sounds: Normal breath sounds. No wheezing. No rhonchi. No rales.   Chest:      Chest wall: Not tender to palpatation.   Cardiovascular:      PMI at left midclavicular line. Normal rate. Regular rhythm. Normal S1. Normal S2.       Murmurs: There is a grade 1/6 systolic murmur at the LLSB.      No gallop.  No click. No rub.   Pulses:     Intact distal pulses.   Edema:     Peripheral edema absent.   Abdominal:      General: Bowel sounds are normal.      Palpations: Abdomen is soft.      Tenderness: There is no abdominal tenderness.   Musculoskeletal: Normal range of motion.         General: No tenderness. Skin:     General: Skin is warm and dry.   Neurological:      General: No focal deficit present.      Mental Status: Alert and oriented to person, place and time.         Lab Review:   Results for orders placed or performed during the hospital encounter of 11/12/19   Basic Metabolic Panel    Specimen: Blood   Result Value Ref Range    Glucose 160 (H) 65 - 99 mg/dL     BUN 12 6 - 20 mg/dL    Creatinine 1.07 0.76 - 1.27 mg/dL    Sodium 139 136 - 145 mmol/L    Potassium 4.0 3.5 - 5.2 mmol/L    Chloride 102 98 - 107 mmol/L    CO2 26.0 22.0 - 29.0 mmol/L    Calcium 8.9 8.6 - 10.5 mg/dL    eGFR Non African Amer 71 >60 mL/min/1.73    BUN/Creatinine Ratio 11.2 7.0 - 25.0    Anion Gap 11.0 5.0 - 15.0 mmol/L   CBC Auto Differential    Specimen: Blood   Result Value Ref Range    WBC 13.64 (H) 3.40 - 10.80 10*3/mm3    RBC 3.83 (L) 4.14 - 5.80 10*6/mm3    Hemoglobin 11.4 (L) 13.0 - 17.7 g/dL    Hematocrit 34.7 (L) 37.5 - 51.0 %    MCV 90.6 79.0 - 97.0 fL    MCH 29.8 26.6 - 33.0 pg    MCHC 32.9 31.5 - 35.7 g/dL    RDW 13.3 12.3 - 15.4 %    RDW-SD 43.9 37.0 - 54.0 fl    MPV 9.7 6.0 - 12.0 fL    Platelets 266 140 - 450 10*3/mm3    Neutrophil % 79.9 (H) 42.7 - 76.0 %    Lymphocyte % 8.2 (L) 19.6 - 45.3 %    Monocyte % 11.4 5.0 - 12.0 %    Eosinophil % 0.0 (L) 0.3 - 6.2 %    Basophil % 0.1 0.0 - 1.5 %    Immature Grans % 0.4 0.0 - 0.5 %    Neutrophils, Absolute 10.89 (H) 1.70 - 7.00 10*3/mm3    Lymphocytes, Absolute 1.12 0.70 - 3.10 10*3/mm3    Monocytes, Absolute 1.56 (H) 0.10 - 0.90 10*3/mm3    Eosinophils, Absolute 0.00 0.00 - 0.40 10*3/mm3    Basophils, Absolute 0.01 0.00 - 0.20 10*3/mm3    Immature Grans, Absolute 0.06 (H) 0.00 - 0.05 10*3/mm3    nRBC 0.0 0.0 - 0.2 /100 WBC   Basic Metabolic Panel    Specimen: Blood   Result Value Ref Range    Glucose 124 (H) 65 - 99 mg/dL    BUN 15 6 - 20 mg/dL    Creatinine 0.93 0.76 - 1.27 mg/dL    Sodium 141 136 - 145 mmol/L    Potassium 3.7 3.5 - 5.2 mmol/L    Chloride 107 98 - 107 mmol/L    CO2 26.1 22.0 - 29.0 mmol/L    Calcium 8.0 (L) 8.6 - 10.5 mg/dL    eGFR Non African Amer 83 >60 mL/min/1.73    BUN/Creatinine Ratio 16.1 7.0 - 25.0    Anion Gap 7.9 5.0 - 15.0 mmol/L   CBC (No Diff)    Specimen: Blood   Result Value Ref Range    WBC 7.32 3.40 - 10.80 10*3/mm3    RBC 3.37 (L) 4.14 - 5.80 10*6/mm3    Hemoglobin 10.1 (L) 13.0 - 17.7 g/dL    Hematocrit  31.3 (L) 37.5 - 51.0 %    MCV 92.9 79.0 - 97.0 fL    MCH 30.0 26.6 - 33.0 pg    MCHC 32.3 31.5 - 35.7 g/dL    RDW 13.7 12.3 - 15.4 %    RDW-SD 46.9 37.0 - 54.0 fl    MPV 9.3 6.0 - 12.0 fL    Platelets 186 140 - 450 10*3/mm3   Tissue Pathology Exam    Specimen: Hip, Right; Bone   Result Value Ref Range    Case Report       Surgical Pathology Report                         Case: [id number]                                  Authorizing Provider:  Syd Ashley MD     Collected:           11/12/2019 09:17 AM          Ordering Location:     [facility] OR Received:            11/12/2019 02:14 PM          Pathologist:           Wilver Maria MD                                                          Specimen:    Hip, Right, BONE FRAGMENTS RIGHT HIP                                                       Final Diagnosis       See Scanned Report         1/31/2023:  CBC: WBC 6.2, RBC 5.04, hemoglobin 14.4, hematocrit 44.4, MCV 88.1, MCH 28.6, MCHC 32.4, RDW 12.7, platelets 260, MPV 9.4, neutrophils 61.7, lymphocytes 24.8, monocytes 10.6, eos 2.4, basos 0.3  CMP: Glucose 112, BUN 24, creatinine 0.9, GFR greater than 60, sodium 141, potassium 4.7, chloride 108, carbon dioxide 27, calcium 9.2, protein 7.3, albumin 4.3, bilirubin 0.5, AST 27, ALT 26, alkaline phosphatase 86  Lipid panel: Cholesterol 250, triglycerides 118, HDL 49,   TSH 1.86  Hemoglobin A1c 5.6%     4/11/2024:  CMP: Glucose 111, BUN 20, creatinine 0.95, GFR greater than 60, sodium 140, potassium 4.4, chloride 106, carbon dioxide 26, calcium 8.8, protein 7.1, albumin 4.1, bilirubin 0.4, AST 25, ALT 21, alkaline phosphatase 110  Lipid panel: Cholesterol 171, triglycerides 102, HDL 45,   TSH 2.68  PSA 0.41  Hemoglobin A1c 5.4%                Assessment:    Patient with labile hypertension readings.  I will discontinue his amlodipine and switch this to telmisartan 80 mg daily.  May consider low-dose amlodipine nightly at a later time  if hypertension uncontrolled.  I will enroll the patient in the hypertension care companion.  He was encouraged for smokeless tobacco cessation and was agreeable to try Nicorette gum.  I will order a CT cardiac calcium score.  The patient will have labs in about 1 week after starting telmisartan.     Diagnosis Plan   1. Essential hypertension  Discontinue amlodipine, start telmisartan 80 mg daily, enroll patient in hypertension care companion      2. Dyslipidemia  Abnormal lipid panel April 2024, continue atorvastatin      3. Smokeless tobacco use  Encouraged tobacco cessation      4. Chest pain, atypical  CT cardiac calcium score             Plan:       Patient to continue current medications and close follow up with the above providers.  Tentative cardiology follow up in June 2024 in the hypertension clinic or patient may return sooner PRN.   Encouraged reduction/cessation of smokeless tobacco use; patient agreeable to Nicorette gum  CT cardiac calcium score  Echocardiogram  Enroll patient in hypertension care companion  Discontinue amlodipine  Telmisartan 80 mg daily  Repeat BMP, mag, LP(a) in 1 week; lab slips provided to patient in office today    Electronically signed by KRISTINA Watts, 04/30/24, 3:38 PM EDT.

## 2024-04-30 ENCOUNTER — OFFICE VISIT (OUTPATIENT)
Dept: CARDIOLOGY | Facility: CLINIC | Age: 64
End: 2024-04-30
Payer: MEDICARE

## 2024-04-30 ENCOUNTER — TELEPHONE (OUTPATIENT)
Dept: CARDIOLOGY | Facility: CLINIC | Age: 64
End: 2024-04-30

## 2024-04-30 VITALS
DIASTOLIC BLOOD PRESSURE: 82 MMHG | OXYGEN SATURATION: 98 % | HEIGHT: 68 IN | SYSTOLIC BLOOD PRESSURE: 124 MMHG | BODY MASS INDEX: 30.16 KG/M2 | WEIGHT: 199 LBS | HEART RATE: 95 BPM

## 2024-04-30 DIAGNOSIS — I10 ESSENTIAL HYPERTENSION: Primary | ICD-10-CM

## 2024-04-30 DIAGNOSIS — R07.89 CHEST PAIN, ATYPICAL: ICD-10-CM

## 2024-04-30 DIAGNOSIS — Z72.0 SMOKELESS TOBACCO USE: ICD-10-CM

## 2024-04-30 DIAGNOSIS — E78.5 DYSLIPIDEMIA: ICD-10-CM

## 2024-04-30 RX ORDER — NALOXONE HYDROCHLORIDE 4 MG/.1ML
SPRAY NASAL
COMMUNITY
Start: 2024-02-12

## 2024-04-30 RX ORDER — OMEGA-3 FATTY ACIDS/FISH OIL 300-1000MG
CAPSULE ORAL
COMMUNITY
Start: 2024-01-24

## 2024-04-30 RX ORDER — TELMISARTAN 80 MG/1
80 TABLET ORAL DAILY
Qty: 90 TABLET | Refills: 3 | Status: SHIPPED | OUTPATIENT
Start: 2024-04-30

## 2024-04-30 NOTE — TELEPHONE ENCOUNTER
Caller: [id number] - [place], [place] - [address] [place] [address] AT [facility] - [phone] [facility] [phone] FX    Relationship: Pharmacy    Best call back number: [phone]    What is the best time to reach you: ANY    Who are you requesting to speak with (clinical staff, provider,  specific staff member): CLINICAL        What was the call regarding: PHARMACY IS REQUESTING THE FREQUENCY ON THE PRESCRIPTION OF THE NICOTINE GUM. PLEASE CONTACT PHARMACY FOR CLARIFICATION    Is it okay if the provider responds through [email or web address]: PLEASE CALL      
Attending Only

## 2024-05-01 DIAGNOSIS — Z72.0 SMOKELESS TOBACCO USE: Primary | ICD-10-CM

## 2024-05-20 ENCOUNTER — HOSPITAL ENCOUNTER (OUTPATIENT)
Facility: HOSPITAL | Age: 64
Discharge: HOME | End: 2024-05-20
Admitting: NURSE PRACTITIONER
Payer: MEDICARE

## 2024-05-20 VITALS — BODY MASS INDEX: 30.17 KG/M2 | HEIGHT: 68 IN | WEIGHT: 199.08 LBS

## 2024-05-20 LAB
ASCENDING AORTA: 3.2 CM
BH CV ECHO MEAS - AO MAX PG: 12.3 MMHG
BH CV ECHO MEAS - AO MEAN PG: 6.5 MMHG
BH CV ECHO MEAS - AO ROOT DIAM: 3.2 CM
BH CV ECHO MEAS - AO V2 MAX: 174.5 CM/SEC
BH CV ECHO MEAS - AO V2 VTI: 33.2 CM
BH CV ECHO MEAS - AVA(I,D): 2.42 CM2
BH CV ECHO MEAS - EDV(CUBED): 74.1 ML
BH CV ECHO MEAS - EDV(MOD-SP2): 66.5 ML
BH CV ECHO MEAS - EDV(MOD-SP4): 100 ML
BH CV ECHO MEAS - EF(MOD-BP): 62 %
BH CV ECHO MEAS - EF(MOD-SP2): 61.8 %
BH CV ECHO MEAS - EF(MOD-SP4): 62.4 %
BH CV ECHO MEAS - ESV(CUBED): 22 ML
BH CV ECHO MEAS - ESV(MOD-SP2): 25.4 ML
BH CV ECHO MEAS - ESV(MOD-SP4): 37.6 ML
BH CV ECHO MEAS - FS: 33.3 %
BH CV ECHO MEAS - IVS/LVPW: 1 CM
BH CV ECHO MEAS - IVSD: 0.8 CM
BH CV ECHO MEAS - LA DIMENSION: 3.7 CM
BH CV ECHO MEAS - LAT PEAK E' VEL: 13.1 CM/SEC
BH CV ECHO MEAS - LV DIASTOLIC VOL/BSA (35-75): 49.2 CM2
BH CV ECHO MEAS - LV MASS(C)D: 101.3 GRAMS
BH CV ECHO MEAS - LV MAX PG: 5.3 MMHG
BH CV ECHO MEAS - LV MEAN PG: 3 MMHG
BH CV ECHO MEAS - LV SYSTOLIC VOL/BSA (12-30): 18.5 CM2
BH CV ECHO MEAS - LV V1 MAX: 115 CM/SEC
BH CV ECHO MEAS - LV V1 VTI: 23.2 CM
BH CV ECHO MEAS - LVIDD: 4.2 CM
BH CV ECHO MEAS - LVIDS: 2.8 CM
BH CV ECHO MEAS - LVOT AREA: 3.5 CM2
BH CV ECHO MEAS - LVOT DIAM: 2.1 CM
BH CV ECHO MEAS - LVPWD: 0.8 CM
BH CV ECHO MEAS - MED PEAK E' VEL: 9.3 CM/SEC
BH CV ECHO MEAS - MV A MAX VEL: 71.1 CM/SEC
BH CV ECHO MEAS - MV DEC SLOPE: 484 CM/SEC2
BH CV ECHO MEAS - MV DEC TIME: 0.16 SEC
BH CV ECHO MEAS - MV E MAX VEL: 79 CM/SEC
BH CV ECHO MEAS - MV E/A: 1.11
BH CV ECHO MEAS - MV MAX PG: 3.1 MMHG
BH CV ECHO MEAS - MV MEAN PG: 2 MMHG
BH CV ECHO MEAS - MV V2 VTI: 20.5 CM
BH CV ECHO MEAS - MVA(VTI): 3.9 CM2
BH CV ECHO MEAS - PA ACC TIME: 0.11 SEC
BH CV ECHO MEAS - PA V2 MAX: 127.5 CM/SEC
BH CV ECHO MEAS - RAP SYSTOLE: 3 MMHG
BH CV ECHO MEAS - RVSP: 17 MMHG
BH CV ECHO MEAS - SV(LVOT): 80.4 ML
BH CV ECHO MEAS - SV(MOD-SP2): 41.1 ML
BH CV ECHO MEAS - SV(MOD-SP4): 62.4 ML
BH CV ECHO MEAS - SVI(LVOT): 39.5 ML/M2
BH CV ECHO MEAS - SVI(MOD-SP2): 20.2 ML/M2
BH CV ECHO MEAS - SVI(MOD-SP4): 30.7 ML/M2
BH CV ECHO MEAS - TAPSE (>1.6): 2.7 CM
BH CV ECHO MEAS - TR MAX PG: 13.7 MMHG
BH CV ECHO MEAS - TR MAX VEL: 185 CM/SEC
BH CV ECHO MEASUREMENTS AVERAGE E/E' RATIO: 7.05
BH CV VAS BP LEFT ARM: NORMAL MMHG
BH CV XLRA - RV BASE: 3.6 CM
BH CV XLRA - RV LENGTH: 8.5 CM
BH CV XLRA - RV MID: 2.5 CM
BH CV XLRA - TDI S': 21.1 CM/SEC
IVRT: 95 MS
LEFT ATRIUM VOLUME INDEX: 27.9 ML/M2

## 2024-05-20 PROCEDURE — 93306 TTE W/DOPPLER COMPLETE: CPT

## (undated) DEVICE — PAD,ABDOMINAL,8"X10",ST,LF: Brand: MEDLINE

## (undated) DEVICE — DRSNG WND GZ PAD BORDERED 4X8IN STRL

## (undated) DEVICE — DRSNG GZ PETROLTM XEROFORM CURAD 1X8IN STRL

## (undated) DEVICE — DRSNG SURESITE123 6X8IN

## (undated) DEVICE — SUT VIC 2/0 CT1 27IN J259H

## (undated) DEVICE — FLEXIBLE YANKAUER,MEDIUM TIP, NO VACUUM CONTROL: Brand: ARGYLE

## (undated) DEVICE — GLV SURG SENSICARE W/ALOE PF LF 8 STRL

## (undated) DEVICE — VIOLET BRAIDED (POLYGLACTIN 910), SYNTHETIC ABSORBABLE SUTURE: Brand: COATED VICRYL

## (undated) DEVICE — PILLW ABD MD

## (undated) DEVICE — 2108 SERIES SAGITTAL BLADE, NO OFFSET  (24.8 X 1.24 X 80.1MM)

## (undated) DEVICE — PK HIP GEN 20

## (undated) DEVICE — CUFF SCD HEMOFORCE SEQ CALF STD MD

## (undated) DEVICE — 1000 S-DRAPE TOWEL DRAPE 10/BX: Brand: STERI-DRAPE™

## (undated) DEVICE — SPNG GZ WOVN 4X4IN 12PLY 10/BX STRL

## (undated) DEVICE — 3M™ STERI-DRAPE™ U-DRAPE 1015: Brand: STERI-DRAPE™

## (undated) DEVICE — SUT ETHIB 5 V37 30IN MB66G

## (undated) DEVICE — GLV SURG TRIUMPH ORTHO W/ALOE PF LTX 8 STRL

## (undated) DEVICE — SHEET,DRAPE,70X100,STERILE: Brand: MEDLINE

## (undated) DEVICE — HANDPIECE SET WITH HIGH FLOW TIP AND SUCTION TUBE: Brand: INTERPULSE